# Patient Record
Sex: MALE | Race: WHITE | NOT HISPANIC OR LATINO | Employment: OTHER | URBAN - METROPOLITAN AREA
[De-identification: names, ages, dates, MRNs, and addresses within clinical notes are randomized per-mention and may not be internally consistent; named-entity substitution may affect disease eponyms.]

---

## 2024-07-09 ENCOUNTER — TRANSFERRED RECORDS (OUTPATIENT)
Dept: HEALTH INFORMATION MANAGEMENT | Facility: CLINIC | Age: 89
End: 2024-07-09

## 2024-08-22 ENCOUNTER — ANCILLARY PROCEDURE (OUTPATIENT)
Dept: MRI IMAGING | Facility: CLINIC | Age: 89
End: 2024-08-22
Attending: UROLOGY
Payer: MEDICARE

## 2024-08-22 DIAGNOSIS — R97.20 ELEVATED PSA: ICD-10-CM

## 2024-08-22 PROCEDURE — 72197 MRI PELVIS W/O & W/DYE: CPT

## 2024-08-22 PROCEDURE — A9585 GADOBUTROL INJECTION: HCPCS | Performed by: UROLOGY

## 2024-08-22 PROCEDURE — 255N000002 HC RX 255 OP 636: Performed by: UROLOGY

## 2024-08-22 RX ORDER — GADOBUTROL 604.72 MG/ML
7.5 INJECTION INTRAVENOUS ONCE
Status: COMPLETED | OUTPATIENT
Start: 2024-08-22 | End: 2024-08-22

## 2024-08-22 RX ADMIN — GADOBUTROL 7.5 ML: 604.72 INJECTION INTRAVENOUS at 16:05

## 2024-12-31 ENCOUNTER — APPOINTMENT (OUTPATIENT)
Dept: CT IMAGING | Facility: CLINIC | Age: 89
DRG: 194 | End: 2024-12-31
Attending: EMERGENCY MEDICINE
Payer: MEDICARE

## 2024-12-31 ENCOUNTER — APPOINTMENT (OUTPATIENT)
Dept: GENERAL RADIOLOGY | Facility: CLINIC | Age: 89
DRG: 194 | End: 2024-12-31
Attending: EMERGENCY MEDICINE
Payer: MEDICARE

## 2024-12-31 ENCOUNTER — HOSPITAL ENCOUNTER (INPATIENT)
Facility: CLINIC | Age: 89
DRG: 194 | End: 2024-12-31
Attending: EMERGENCY MEDICINE | Admitting: HOSPITALIST
Payer: MEDICARE

## 2024-12-31 DIAGNOSIS — E78.5 HYPERLIPIDEMIA LDL GOAL <130: ICD-10-CM

## 2024-12-31 DIAGNOSIS — R53.1 GENERAL WEAKNESS: ICD-10-CM

## 2024-12-31 DIAGNOSIS — B00.59 HSV (HERPES SIMPLEX VIRUS) INFECTION OF EYELID: ICD-10-CM

## 2024-12-31 DIAGNOSIS — J18.9 PNEUMONIA OF BOTH LUNGS DUE TO INFECTIOUS ORGANISM, UNSPECIFIED PART OF LUNG: ICD-10-CM

## 2024-12-31 DIAGNOSIS — K21.00 GASTROESOPHAGEAL REFLUX DISEASE WITH ESOPHAGITIS WITHOUT HEMORRHAGE: ICD-10-CM

## 2024-12-31 DIAGNOSIS — R06.09 DYSPNEA ON EXERTION: ICD-10-CM

## 2024-12-31 DIAGNOSIS — N40.0 BENIGN PROSTATIC HYPERPLASIA WITHOUT LOWER URINARY TRACT SYMPTOMS: ICD-10-CM

## 2024-12-31 DIAGNOSIS — F02.A0 MILD DEMENTIA ASSOCIATED WITH OTHER UNDERLYING DISEASE, WITHOUT BEHAVIORAL DISTURBANCE, PSYCHOTIC DISTURBANCE, MOOD DISTURBANCE, OR ANXIETY (H): Primary | ICD-10-CM

## 2024-12-31 DIAGNOSIS — I10 BENIGN ESSENTIAL HYPERTENSION: ICD-10-CM

## 2024-12-31 LAB
ANION GAP SERPL CALCULATED.3IONS-SCNC: 10 MMOL/L (ref 7–15)
ATRIAL RATE - MUSE: 80 BPM
BASOPHILS # BLD AUTO: 0.1 10E3/UL (ref 0–0.2)
BASOPHILS NFR BLD AUTO: 0 %
BUN SERPL-MCNC: 22 MG/DL (ref 8–23)
CALCIUM SERPL-MCNC: 9.2 MG/DL (ref 8.8–10.4)
CHLORIDE SERPL-SCNC: 107 MMOL/L (ref 98–107)
CREAT SERPL-MCNC: 0.97 MG/DL (ref 0.67–1.17)
D DIMER PPP FEU-MCNC: 3.14 UG/ML FEU (ref 0–0.5)
DIASTOLIC BLOOD PRESSURE - MUSE: NORMAL MMHG
EGFRCR SERPLBLD CKD-EPI 2021: 74 ML/MIN/1.73M2
EOSINOPHIL # BLD AUTO: 0.1 10E3/UL (ref 0–0.7)
EOSINOPHIL NFR BLD AUTO: 1 %
ERYTHROCYTE [DISTWIDTH] IN BLOOD BY AUTOMATED COUNT: 12.7 % (ref 10–15)
FLUAV RNA SPEC QL NAA+PROBE: NEGATIVE
FLUBV RNA RESP QL NAA+PROBE: NEGATIVE
GLUCOSE SERPL-MCNC: 133 MG/DL (ref 70–99)
HCO3 SERPL-SCNC: 26 MMOL/L (ref 22–29)
HCT VFR BLD AUTO: 36.3 % (ref 40–53)
HGB BLD-MCNC: 11.9 G/DL (ref 13.3–17.7)
HOLD SPECIMEN: NORMAL
IMM GRANULOCYTES # BLD: 0.2 10E3/UL
IMM GRANULOCYTES NFR BLD: 2 %
INTERPRETATION ECG - MUSE: NORMAL
LYMPHOCYTES # BLD AUTO: 1.2 10E3/UL (ref 0.8–5.3)
LYMPHOCYTES NFR BLD AUTO: 10 %
MCH RBC QN AUTO: 31.5 PG (ref 26.5–33)
MCHC RBC AUTO-ENTMCNC: 32.8 G/DL (ref 31.5–36.5)
MCV RBC AUTO: 96 FL (ref 78–100)
MONOCYTES # BLD AUTO: 0.7 10E3/UL (ref 0–1.3)
MONOCYTES NFR BLD AUTO: 6 %
NEUTROPHILS # BLD AUTO: 9.3 10E3/UL (ref 1.6–8.3)
NEUTROPHILS NFR BLD AUTO: 80 %
NRBC # BLD AUTO: 0 10E3/UL
NRBC BLD AUTO-RTO: 0 /100
NT-PROBNP SERPL-MCNC: 1269 PG/ML (ref 0–1800)
P AXIS - MUSE: 25 DEGREES
PLATELET # BLD AUTO: 324 10E3/UL (ref 150–450)
POTASSIUM SERPL-SCNC: 4.5 MMOL/L (ref 3.4–5.3)
PR INTERVAL - MUSE: 168 MS
QRS DURATION - MUSE: 130 MS
QT - MUSE: 396 MS
QTC - MUSE: 456 MS
R AXIS - MUSE: -53 DEGREES
RBC # BLD AUTO: 3.78 10E6/UL (ref 4.4–5.9)
RSV RNA SPEC NAA+PROBE: NEGATIVE
SARS-COV-2 RNA RESP QL NAA+PROBE: NEGATIVE
SODIUM SERPL-SCNC: 143 MMOL/L (ref 135–145)
SYSTOLIC BLOOD PRESSURE - MUSE: NORMAL MMHG
T AXIS - MUSE: 43 DEGREES
TROPONIN T SERPL HS-MCNC: 37 NG/L
TROPONIN T SERPL HS-MCNC: 39 NG/L
VENTRICULAR RATE- MUSE: 80 BPM
WBC # BLD AUTO: 11.6 10E3/UL (ref 4–11)

## 2024-12-31 PROCEDURE — 250N000009 HC RX 250: Performed by: EMERGENCY MEDICINE

## 2024-12-31 PROCEDURE — 82550 ASSAY OF CK (CPK): CPT | Performed by: INTERNAL MEDICINE

## 2024-12-31 PROCEDURE — 250N000011 HC RX IP 250 OP 636: Performed by: EMERGENCY MEDICINE

## 2024-12-31 PROCEDURE — 83880 ASSAY OF NATRIURETIC PEPTIDE: CPT | Performed by: EMERGENCY MEDICINE

## 2024-12-31 PROCEDURE — 36415 COLL VENOUS BLD VENIPUNCTURE: CPT | Performed by: EMERGENCY MEDICINE

## 2024-12-31 PROCEDURE — 93005 ELECTROCARDIOGRAM TRACING: CPT

## 2024-12-31 PROCEDURE — 87637 SARSCOV2&INF A&B&RSV AMP PRB: CPT | Performed by: EMERGENCY MEDICINE

## 2024-12-31 PROCEDURE — G1010 CDSM STANSON: HCPCS

## 2024-12-31 PROCEDURE — 96365 THER/PROPH/DIAG IV INF INIT: CPT

## 2024-12-31 PROCEDURE — 99285 EMERGENCY DEPT VISIT HI MDM: CPT | Mod: 25

## 2024-12-31 PROCEDURE — 80048 BASIC METABOLIC PNL TOTAL CA: CPT | Performed by: EMERGENCY MEDICINE

## 2024-12-31 PROCEDURE — 83036 HEMOGLOBIN GLYCOSYLATED A1C: CPT | Performed by: INTERNAL MEDICINE

## 2024-12-31 PROCEDURE — 85379 FIBRIN DEGRADATION QUANT: CPT | Performed by: EMERGENCY MEDICINE

## 2024-12-31 PROCEDURE — 72170 X-RAY EXAM OF PELVIS: CPT

## 2024-12-31 PROCEDURE — 84484 ASSAY OF TROPONIN QUANT: CPT | Performed by: EMERGENCY MEDICINE

## 2024-12-31 PROCEDURE — 85014 HEMATOCRIT: CPT | Performed by: EMERGENCY MEDICINE

## 2024-12-31 PROCEDURE — 85049 AUTOMATED PLATELET COUNT: CPT | Performed by: EMERGENCY MEDICINE

## 2024-12-31 PROCEDURE — 85004 AUTOMATED DIFF WBC COUNT: CPT | Performed by: EMERGENCY MEDICINE

## 2024-12-31 PROCEDURE — 84145 PROCALCITONIN (PCT): CPT | Performed by: INTERNAL MEDICINE

## 2024-12-31 PROCEDURE — 84443 ASSAY THYROID STIM HORMONE: CPT | Performed by: INTERNAL MEDICINE

## 2024-12-31 PROCEDURE — 71046 X-RAY EXAM CHEST 2 VIEWS: CPT

## 2024-12-31 PROCEDURE — 85025 COMPLETE CBC W/AUTO DIFF WBC: CPT | Performed by: EMERGENCY MEDICINE

## 2024-12-31 RX ORDER — FAMOTIDINE 20 MG/1
20 TABLET, FILM COATED ORAL 2 TIMES DAILY
Status: ON HOLD | COMMUNITY
End: 2025-01-06

## 2024-12-31 RX ORDER — LOSARTAN POTASSIUM 25 MG/1
25 TABLET ORAL DAILY
Status: ON HOLD | COMMUNITY
End: 2025-01-06

## 2024-12-31 RX ORDER — ROSUVASTATIN CALCIUM 10 MG/1
10 TABLET, COATED ORAL DAILY
Status: ON HOLD | COMMUNITY
End: 2025-01-06

## 2024-12-31 RX ORDER — DONEPEZIL HYDROCHLORIDE 5 MG/1
5 TABLET, FILM COATED ORAL EVERY EVENING
Status: ON HOLD | COMMUNITY
End: 2025-01-06

## 2024-12-31 RX ORDER — TRAZODONE HYDROCHLORIDE 50 MG/1
12.5 TABLET, FILM COATED ORAL AT BEDTIME
Status: ON HOLD | COMMUNITY
End: 2025-01-05

## 2024-12-31 RX ORDER — AZITHROMYCIN MONOHYDRATE 500 MG/5ML
500 INJECTION, POWDER, LYOPHILIZED, FOR SOLUTION INTRAVENOUS ONCE
Status: COMPLETED | OUTPATIENT
Start: 2024-12-31 | End: 2025-01-01

## 2024-12-31 RX ORDER — TAMSULOSIN HYDROCHLORIDE 0.4 MG/1
0.4 CAPSULE ORAL EVERY EVENING
Status: ON HOLD | COMMUNITY
End: 2025-01-06

## 2024-12-31 RX ORDER — FAMCICLOVIR 500 MG/1
500 TABLET ORAL 2 TIMES DAILY
Status: ON HOLD | COMMUNITY
End: 2025-01-06

## 2024-12-31 RX ORDER — ACETAMINOPHEN 500 MG
500 TABLET ORAL EVERY EVENING
Status: ON HOLD | COMMUNITY
End: 2025-01-05

## 2024-12-31 RX ORDER — CEFTRIAXONE 1 G/1
1 INJECTION, POWDER, FOR SOLUTION INTRAMUSCULAR; INTRAVENOUS ONCE
Status: COMPLETED | OUTPATIENT
Start: 2024-12-31 | End: 2025-01-01

## 2024-12-31 RX ORDER — IOPAMIDOL 755 MG/ML
59 INJECTION, SOLUTION INTRAVASCULAR ONCE
Status: COMPLETED | OUTPATIENT
Start: 2024-12-31 | End: 2024-12-31

## 2024-12-31 RX ORDER — METOPROLOL SUCCINATE 25 MG/1
25 TABLET, EXTENDED RELEASE ORAL DAILY
Status: ON HOLD | COMMUNITY
End: 2025-01-06

## 2024-12-31 RX ADMIN — SODIUM CHLORIDE 85 ML: 9 INJECTION, SOLUTION INTRAVENOUS at 22:44

## 2024-12-31 RX ADMIN — IOPAMIDOL 59 ML: 755 INJECTION, SOLUTION INTRAVENOUS at 22:43

## 2024-12-31 RX ADMIN — AZITHROMYCIN MONOHYDRATE 500 MG: 500 INJECTION, POWDER, LYOPHILIZED, FOR SOLUTION INTRAVENOUS at 23:56

## 2024-12-31 RX ADMIN — CEFTRIAXONE SODIUM 1 G: 1 INJECTION, POWDER, FOR SOLUTION INTRAMUSCULAR; INTRAVENOUS at 23:54

## 2024-12-31 ASSESSMENT — COLUMBIA-SUICIDE SEVERITY RATING SCALE - C-SSRS
6. HAVE YOU EVER DONE ANYTHING, STARTED TO DO ANYTHING, OR PREPARED TO DO ANYTHING TO END YOUR LIFE?: NO
2. HAVE YOU ACTUALLY HAD ANY THOUGHTS OF KILLING YOURSELF IN THE PAST MONTH?: NO
1. IN THE PAST MONTH, HAVE YOU WISHED YOU WERE DEAD OR WISHED YOU COULD GO TO SLEEP AND NOT WAKE UP?: NO

## 2024-12-31 ASSESSMENT — ACTIVITIES OF DAILY LIVING (ADL)
ADLS_ACUITY_SCORE: 41

## 2024-12-31 NOTE — ED TRIAGE NOTES
Pt c/o bilateral leg pain and R hip pain every since he was shoveling his sidewalk 2 weeks ago. Pt also c/o cough and pain to bilateral upper chest when he inhales. Pt lost his wife in october and reports some depression and decreased appetite.      Triage Assessment (Adult)       Row Name 12/31/24 6635          Triage Assessment    Airway WDL WDL        Respiratory WDL    Respiratory WDL WDL        Cardiac WDL    Cardiac WDL chest pain        Chest Pain Assessment    Chest Pain Location anterior chest, left;anterior chest, right     Character stabbing        Cognitive/Neuro/Behavioral WDL    Cognitive/Neuro/Behavioral WDL WDL

## 2025-01-01 ENCOUNTER — APPOINTMENT (OUTPATIENT)
Dept: SPEECH THERAPY | Facility: CLINIC | Age: OVER 89
DRG: 194 | End: 2025-01-01
Attending: INTERNAL MEDICINE
Payer: MEDICARE

## 2025-01-01 ENCOUNTER — APPOINTMENT (OUTPATIENT)
Dept: ULTRASOUND IMAGING | Facility: CLINIC | Age: OVER 89
DRG: 194 | End: 2025-01-01
Attending: INTERNAL MEDICINE
Payer: MEDICARE

## 2025-01-01 ENCOUNTER — APPOINTMENT (OUTPATIENT)
Dept: CT IMAGING | Facility: CLINIC | Age: OVER 89
DRG: 194 | End: 2025-01-01
Attending: INTERNAL MEDICINE
Payer: MEDICARE

## 2025-01-01 PROBLEM — J18.9 PNEUMONIA OF BOTH LUNGS DUE TO INFECTIOUS ORGANISM, UNSPECIFIED PART OF LUNG: Status: ACTIVE | Noted: 2025-01-01

## 2025-01-01 PROBLEM — R06.09 DYSPNEA ON EXERTION: Status: ACTIVE | Noted: 2025-01-01

## 2025-01-01 PROBLEM — R53.1 GENERAL WEAKNESS: Status: ACTIVE | Noted: 2025-01-01

## 2025-01-01 LAB
ALBUMIN SERPL BCG-MCNC: 2.7 G/DL (ref 3.5–5.2)
ALP SERPL-CCNC: 75 U/L (ref 40–150)
ALT SERPL W P-5'-P-CCNC: 35 U/L (ref 0–70)
ANION GAP SERPL CALCULATED.3IONS-SCNC: 8 MMOL/L (ref 7–15)
AST SERPL W P-5'-P-CCNC: 35 U/L (ref 0–45)
BASOPHILS # BLD AUTO: 0.1 10E3/UL (ref 0–0.2)
BASOPHILS NFR BLD AUTO: 1 %
BILIRUB SERPL-MCNC: 0.4 MG/DL
BUN SERPL-MCNC: 15 MG/DL (ref 8–23)
C PNEUM DNA SPEC QL NAA+PROBE: NOT DETECTED
CALCIUM SERPL-MCNC: 8.4 MG/DL (ref 8.8–10.4)
CHLORIDE SERPL-SCNC: 107 MMOL/L (ref 98–107)
CK SERPL-CCNC: 83 U/L (ref 39–308)
CREAT SERPL-MCNC: 0.77 MG/DL (ref 0.67–1.17)
EGFRCR SERPLBLD CKD-EPI 2021: 85 ML/MIN/1.73M2
EOSINOPHIL # BLD AUTO: 0.2 10E3/UL (ref 0–0.7)
EOSINOPHIL NFR BLD AUTO: 2 %
ERYTHROCYTE [DISTWIDTH] IN BLOOD BY AUTOMATED COUNT: 12.7 % (ref 10–15)
ERYTHROCYTE [SEDIMENTATION RATE] IN BLOOD BY WESTERGREN METHOD: 82 MM/HR (ref 0–20)
EST. AVERAGE GLUCOSE BLD GHB EST-MCNC: 120 MG/DL
FLUAV H1 2009 PAND RNA SPEC QL NAA+PROBE: NOT DETECTED
FLUAV H1 RNA SPEC QL NAA+PROBE: NOT DETECTED
FLUAV H3 RNA SPEC QL NAA+PROBE: NOT DETECTED
FLUAV RNA SPEC QL NAA+PROBE: NOT DETECTED
FLUBV RNA SPEC QL NAA+PROBE: NOT DETECTED
GLUCOSE SERPL-MCNC: 100 MG/DL (ref 70–99)
HADV DNA SPEC QL NAA+PROBE: NOT DETECTED
HBA1C MFR BLD: 5.8 %
HCO3 SERPL-SCNC: 26 MMOL/L (ref 22–29)
HCOV PNL SPEC NAA+PROBE: NOT DETECTED
HCT VFR BLD AUTO: 30.6 % (ref 40–53)
HGB BLD-MCNC: 10.5 G/DL (ref 13.3–17.7)
HMPV RNA SPEC QL NAA+PROBE: NOT DETECTED
HPIV1 RNA SPEC QL NAA+PROBE: NOT DETECTED
HPIV2 RNA SPEC QL NAA+PROBE: NOT DETECTED
HPIV3 RNA SPEC QL NAA+PROBE: NOT DETECTED
HPIV4 RNA SPEC QL NAA+PROBE: NOT DETECTED
IMM GRANULOCYTES # BLD: 0.1 10E3/UL
IMM GRANULOCYTES NFR BLD: 2 %
INR PPP: 1.22 (ref 0.85–1.15)
L PNEUMO1 AG UR QL IA: NEGATIVE
LYMPHOCYTES # BLD AUTO: 1 10E3/UL (ref 0.8–5.3)
LYMPHOCYTES NFR BLD AUTO: 12 %
M PNEUMO DNA SPEC QL NAA+PROBE: NOT DETECTED
MCH RBC QN AUTO: 32.5 PG (ref 26.5–33)
MCHC RBC AUTO-ENTMCNC: 34.3 G/DL (ref 31.5–36.5)
MCV RBC AUTO: 95 FL (ref 78–100)
MONOCYTES # BLD AUTO: 0.6 10E3/UL (ref 0–1.3)
MONOCYTES NFR BLD AUTO: 7 %
NEUTROPHILS # BLD AUTO: 6.4 10E3/UL (ref 1.6–8.3)
NEUTROPHILS NFR BLD AUTO: 76 %
NRBC # BLD AUTO: 0 10E3/UL
NRBC BLD AUTO-RTO: 0 /100
PLATELET # BLD AUTO: 286 10E3/UL (ref 150–450)
POTASSIUM SERPL-SCNC: 4.1 MMOL/L (ref 3.4–5.3)
PROCALCITONIN SERPL IA-MCNC: 0.31 NG/ML
PROT SERPL-MCNC: 5.6 G/DL (ref 6.4–8.3)
RBC # BLD AUTO: 3.23 10E6/UL (ref 4.4–5.9)
RSV RNA SPEC QL NAA+PROBE: NOT DETECTED
RSV RNA SPEC QL NAA+PROBE: NOT DETECTED
RV+EV RNA SPEC QL NAA+PROBE: NOT DETECTED
S PNEUM AG SPEC QL: POSITIVE
SODIUM SERPL-SCNC: 141 MMOL/L (ref 135–145)
SPECIMEN TYPE: ABNORMAL
TSH SERPL DL<=0.005 MIU/L-ACNC: 1.28 UIU/ML (ref 0.3–4.2)
WBC # BLD AUTO: 8.4 10E3/UL (ref 4–11)

## 2025-01-01 PROCEDURE — 85004 AUTOMATED DIFF WBC COUNT: CPT | Performed by: INTERNAL MEDICINE

## 2025-01-01 PROCEDURE — 96372 THER/PROPH/DIAG INJ SC/IM: CPT | Performed by: INTERNAL MEDICINE

## 2025-01-01 PROCEDURE — 82040 ASSAY OF SERUM ALBUMIN: CPT | Performed by: INTERNAL MEDICINE

## 2025-01-01 PROCEDURE — 92610 EVALUATE SWALLOWING FUNCTION: CPT | Mod: GN

## 2025-01-01 PROCEDURE — 96376 TX/PRO/DX INJ SAME DRUG ADON: CPT

## 2025-01-01 PROCEDURE — 80051 ELECTROLYTE PANEL: CPT | Performed by: INTERNAL MEDICINE

## 2025-01-01 PROCEDURE — 99223 1ST HOSP IP/OBS HIGH 75: CPT | Mod: AI | Performed by: INTERNAL MEDICINE

## 2025-01-01 PROCEDURE — 36415 COLL VENOUS BLD VENIPUNCTURE: CPT | Performed by: INTERNAL MEDICINE

## 2025-01-01 PROCEDURE — 87581 M.PNEUMON DNA AMP PROBE: CPT | Performed by: INTERNAL MEDICINE

## 2025-01-01 PROCEDURE — 99207 PR NO BILLABLE SERVICE THIS VISIT: CPT

## 2025-01-01 PROCEDURE — 87205 SMEAR GRAM STAIN: CPT | Performed by: INTERNAL MEDICINE

## 2025-01-01 PROCEDURE — 96361 HYDRATE IV INFUSION ADD-ON: CPT

## 2025-01-01 PROCEDURE — 85652 RBC SED RATE AUTOMATED: CPT | Performed by: INTERNAL MEDICINE

## 2025-01-01 PROCEDURE — 87899 AGENT NOS ASSAY W/OPTIC: CPT | Performed by: INTERNAL MEDICINE

## 2025-01-01 PROCEDURE — 87486 CHLMYD PNEUM DNA AMP PROBE: CPT | Performed by: INTERNAL MEDICINE

## 2025-01-01 PROCEDURE — 85014 HEMATOCRIT: CPT | Performed by: INTERNAL MEDICINE

## 2025-01-01 PROCEDURE — 250N000011 HC RX IP 250 OP 636: Performed by: INTERNAL MEDICINE

## 2025-01-01 PROCEDURE — 258N000003 HC RX IP 258 OP 636

## 2025-01-01 PROCEDURE — 99418 PROLNG IP/OBS E/M EA 15 MIN: CPT | Performed by: INTERNAL MEDICINE

## 2025-01-01 PROCEDURE — G0378 HOSPITAL OBSERVATION PER HR: HCPCS

## 2025-01-01 PROCEDURE — 73700 CT LOWER EXTREMITY W/O DYE: CPT | Mod: LT

## 2025-01-01 PROCEDURE — 85610 PROTHROMBIN TIME: CPT | Performed by: INTERNAL MEDICINE

## 2025-01-01 PROCEDURE — 93970 EXTREMITY STUDY: CPT

## 2025-01-01 PROCEDURE — 99232 SBSQ HOSP IP/OBS MODERATE 35: CPT | Performed by: HOSPITALIST

## 2025-01-01 PROCEDURE — 250N000013 HC RX MED GY IP 250 OP 250 PS 637: Performed by: INTERNAL MEDICINE

## 2025-01-01 PROCEDURE — 87070 CULTURE OTHR SPECIMN AEROBIC: CPT | Performed by: INTERNAL MEDICINE

## 2025-01-01 RX ORDER — SODIUM CHLORIDE, SODIUM LACTATE, POTASSIUM CHLORIDE, CALCIUM CHLORIDE 600; 310; 30; 20 MG/100ML; MG/100ML; MG/100ML; MG/100ML
INJECTION, SOLUTION INTRAVENOUS CONTINUOUS
Status: ACTIVE | OUTPATIENT
Start: 2025-01-01 | End: 2025-01-01

## 2025-01-01 RX ORDER — HYDRALAZINE HYDROCHLORIDE 20 MG/ML
10 INJECTION INTRAMUSCULAR; INTRAVENOUS EVERY 6 HOURS PRN
Status: DISCONTINUED | OUTPATIENT
Start: 2025-01-01 | End: 2025-01-01

## 2025-01-01 RX ORDER — HYDRALAZINE HYDROCHLORIDE 20 MG/ML
10 INJECTION INTRAMUSCULAR; INTRAVENOUS EVERY 6 HOURS PRN
Status: DISCONTINUED | OUTPATIENT
Start: 2025-01-01 | End: 2025-01-06 | Stop reason: HOSPADM

## 2025-01-01 RX ORDER — TAMSULOSIN HYDROCHLORIDE 0.4 MG/1
0.4 CAPSULE ORAL EVERY EVENING
Status: DISCONTINUED | OUTPATIENT
Start: 2025-01-01 | End: 2025-01-06 | Stop reason: HOSPADM

## 2025-01-01 RX ORDER — METOPROLOL SUCCINATE 25 MG/1
25 TABLET, EXTENDED RELEASE ORAL DAILY
Status: DISCONTINUED | OUTPATIENT
Start: 2025-01-01 | End: 2025-01-06 | Stop reason: HOSPADM

## 2025-01-01 RX ORDER — ONDANSETRON 4 MG/1
4 TABLET, ORALLY DISINTEGRATING ORAL EVERY 6 HOURS PRN
Status: DISCONTINUED | OUTPATIENT
Start: 2025-01-01 | End: 2025-01-06 | Stop reason: HOSPADM

## 2025-01-01 RX ORDER — LOSARTAN POTASSIUM 25 MG/1
25 TABLET ORAL DAILY
Status: DISCONTINUED | OUTPATIENT
Start: 2025-01-01 | End: 2025-01-06 | Stop reason: HOSPADM

## 2025-01-01 RX ORDER — PROCHLORPERAZINE MALEATE 5 MG/1
5 TABLET ORAL EVERY 6 HOURS PRN
Status: DISCONTINUED | OUTPATIENT
Start: 2025-01-01 | End: 2025-01-06 | Stop reason: HOSPADM

## 2025-01-01 RX ORDER — ACETAMINOPHEN 650 MG/1
650 SUPPOSITORY RECTAL EVERY 4 HOURS PRN
Status: DISCONTINUED | OUTPATIENT
Start: 2025-01-01 | End: 2025-01-06 | Stop reason: HOSPADM

## 2025-01-01 RX ORDER — DONEPEZIL HYDROCHLORIDE 5 MG/1
5 TABLET, FILM COATED ORAL EVERY EVENING
Status: DISCONTINUED | OUTPATIENT
Start: 2025-01-01 | End: 2025-01-06 | Stop reason: HOSPADM

## 2025-01-01 RX ORDER — FAMOTIDINE 20 MG/1
20 TABLET, FILM COATED ORAL 2 TIMES DAILY
Status: DISCONTINUED | OUTPATIENT
Start: 2025-01-01 | End: 2025-01-06 | Stop reason: HOSPADM

## 2025-01-01 RX ORDER — AZITHROMYCIN 250 MG/1
250 TABLET, FILM COATED ORAL AT BEDTIME
Status: COMPLETED | OUTPATIENT
Start: 2025-01-01 | End: 2025-01-04

## 2025-01-01 RX ORDER — ROSUVASTATIN CALCIUM 10 MG/1
10 TABLET, COATED ORAL DAILY
Status: DISCONTINUED | OUTPATIENT
Start: 2025-01-01 | End: 2025-01-06 | Stop reason: HOSPADM

## 2025-01-01 RX ORDER — ONDANSETRON 2 MG/ML
4 INJECTION INTRAMUSCULAR; INTRAVENOUS EVERY 6 HOURS PRN
Status: DISCONTINUED | OUTPATIENT
Start: 2025-01-01 | End: 2025-01-06 | Stop reason: HOSPADM

## 2025-01-01 RX ORDER — CEFTRIAXONE 2 G/1
2 INJECTION, POWDER, FOR SOLUTION INTRAMUSCULAR; INTRAVENOUS EVERY 24 HOURS
Status: DISCONTINUED | OUTPATIENT
Start: 2025-01-01 | End: 2025-01-05

## 2025-01-01 RX ORDER — AMOXICILLIN 250 MG
1 CAPSULE ORAL 2 TIMES DAILY PRN
Status: DISCONTINUED | OUTPATIENT
Start: 2025-01-01 | End: 2025-01-06 | Stop reason: HOSPADM

## 2025-01-01 RX ORDER — ACETAMINOPHEN 325 MG/1
650 TABLET ORAL EVERY 4 HOURS PRN
Status: DISCONTINUED | OUTPATIENT
Start: 2025-01-01 | End: 2025-01-06 | Stop reason: HOSPADM

## 2025-01-01 RX ORDER — ENOXAPARIN SODIUM 100 MG/ML
40 INJECTION SUBCUTANEOUS EVERY 24 HOURS
Status: DISCONTINUED | OUTPATIENT
Start: 2025-01-01 | End: 2025-01-06 | Stop reason: HOSPADM

## 2025-01-01 RX ORDER — AMOXICILLIN 250 MG
2 CAPSULE ORAL 2 TIMES DAILY PRN
Status: DISCONTINUED | OUTPATIENT
Start: 2025-01-01 | End: 2025-01-06 | Stop reason: HOSPADM

## 2025-01-01 RX ADMIN — CEFTRIAXONE SODIUM 2 G: 2 INJECTION, POWDER, FOR SOLUTION INTRAMUSCULAR; INTRAVENOUS at 22:40

## 2025-01-01 RX ADMIN — METOPROLOL SUCCINATE 25 MG: 25 TABLET, EXTENDED RELEASE ORAL at 13:07

## 2025-01-01 RX ADMIN — FAMOTIDINE 20 MG: 20 TABLET ORAL at 13:07

## 2025-01-01 RX ADMIN — ENOXAPARIN SODIUM 40 MG: 40 INJECTION SUBCUTANEOUS at 09:27

## 2025-01-01 RX ADMIN — TAMSULOSIN HYDROCHLORIDE 0.4 MG: 0.4 CAPSULE ORAL at 19:30

## 2025-01-01 RX ADMIN — AZITHROMYCIN DIHYDRATE 250 MG: 250 TABLET ORAL at 21:13

## 2025-01-01 RX ADMIN — SODIUM CHLORIDE, POTASSIUM CHLORIDE, SODIUM LACTATE AND CALCIUM CHLORIDE: 600; 310; 30; 20 INJECTION, SOLUTION INTRAVENOUS at 12:44

## 2025-01-01 RX ADMIN — LOSARTAN POTASSIUM 25 MG: 25 TABLET, FILM COATED ORAL at 13:07

## 2025-01-01 RX ADMIN — ROSUVASTATIN CALCIUM 10 MG: 10 TABLET, FILM COATED ORAL at 13:07

## 2025-01-01 RX ADMIN — FAMOTIDINE 20 MG: 20 TABLET ORAL at 19:30

## 2025-01-01 RX ADMIN — DONEPEZIL HYDROCHLORIDE 5 MG: 5 TABLET, FILM COATED ORAL at 21:13

## 2025-01-01 ASSESSMENT — ACTIVITIES OF DAILY LIVING (ADL)
ADLS_ACUITY_SCORE: 46
ADLS_ACUITY_SCORE: 46
ADLS_ACUITY_SCORE: 41
ADLS_ACUITY_SCORE: 46
ADLS_ACUITY_SCORE: 52
ADLS_ACUITY_SCORE: 41
ADLS_ACUITY_SCORE: 52
ADLS_ACUITY_SCORE: 46
ADLS_ACUITY_SCORE: 41
ADLS_ACUITY_SCORE: 46
ADLS_ACUITY_SCORE: 41
ADLS_ACUITY_SCORE: 46
ADLS_ACUITY_SCORE: 52
ADLS_ACUITY_SCORE: 41

## 2025-01-01 NOTE — H&P
History and Physical - Hospitalist Service       Date of Admission:  12/31/2024    Assessment & Plan      Riki Alvarez is a 89 year old male admitted on 12/31/2024. He has PMH most notable for HTN, HLD, cognitive impairment, GERD, BPH that presents with progressive generalized weakness and has findings concerning for community-acquired versus aspiration pneumonia.    #Suspected community-acquired versus aspiration pneumonia  The patient presents with productive cough, dyspnea on exertion and progressive generalized weakness.  On exam he is AF, RR 20, satting 97% on RA.  WBC 11.6.  COVID/RSV/flu swab negative.  CXR with bibasilar infiltrates greater on the left.  CTA chest with no PE but did demonstrate infiltrates greater on the left as well and debris-filled left lower lobe.  The patient endorses aspirating his secretions from time to time but not solids or liquids.  - Continue Rocephin and azithromycin started in the ED  - SLP consult  - Add on procalcitonin  - RSV/flu/COVID swab negative  - Strep pneumo urinary antigen, Legionella urinary antigen, sputum culture and Gram stain  - Head of bed > 30 degrees  -Respiratory viral panel    #Left hip pain  #Generalized weakness  #Bilateral lower extremity pain  The patient presents with progressive generalized weakness, pain in his knees, lower extremity musculature, and left hip.  Denies recent falls.  On exam he is able to flex his hips and knees .  X-ray pelvis with no acute fracture but does demonstrate mild degenerative arthritis of both hips.  Differential includes osteoarthritis (he did previously receive steroid injections in his knees) v occult fracture.  Less likely polymyalgia rheumatica or rhabdomyolysis.  Alternatively he could just have progressive generalized weakness in the setting of pneumonia and debility.  - CT left hip  - Add on TSH, CK, ESR  - PT/OT consult pending laboratory and imaging results (not  ordered at this time)  - Tylenol as needed for pain    #Elevated troponin  #Suspected pleurisy  #Noncardiac chest pain  The patient presents with several days of chest pain that is only present when he coughs or takes deep breaths..  On exam he was asked to cough which did reproduce the pain.  No pain when he is not coughing.  EKG sinus rhythm with sinus arrhythmia, right bundle branch block, left anterior fascicular block.  No contiguous ST segment elevation/depressions or diffuse T wave inversions.  Troponin is mildly elevated and downtrending and likely due to demand ischemia in the setting of pneumonia.  CTA chest negative for PE.  No friction rubs on exam to suggest pericarditis.  - Tylenol as needed    #Elevated D-dimer  - D-dimer significantly elevated at 3.31  - CTA chest negative for PE  - Will acquire bilateral lower extremity ultrasound to rule out lower extremity DVT  - Lovenox for DVT prophylaxis    #Grief  - The patient lost his wife in 10/2024.  He states he is depressed secondary to this but denies any suicidal ideation.    #Hyperglycemia  - Will acquire A1c, monitor glucose with labs for now    #HTN  - Continue PTA losartan 25 mg daily, metoprolol succinate 25 mg daily    #HLD  - Continue PTA Crestor 10 mg daily    #BPH  - Continue PTA Flomax    #Cognitive impairment  - Continue PTA donepezil        Diet:  N.p.o. pending SLP xanderal, no IVF for now  DVT Prophylaxis: Enoxaparin (Lovenox) SQ  Garcia Catheter: Not present  Lines: None     Cardiac Monitoring: None  Code Status:  DNR but okay for prearrest intubation, discussed with the patient and his daughter on admission    Clinically Significant Risk Factors Present on Admission                   # Hypertension: Home medication list includes antihypertensive(s)                      Disposition Plan     Medically Ready for Discharge: Anticipated Tomorrow           Kelechi Ibanez MD  Hospitalist Service  [facility]  Securely  message with John (more info)  Text page via [facility] Paging/Directory     ______________________________________________________________________    Chief Complaint   Generalized weakness and cough    History is obtained from the patient    History of Present Illness   Riki Alvarez is a 89 year old male who has PMH most notable for HTN, HLD, cognitive impairment, GERD, BPH that presents with progressive generalized weakness and a cough.    The patient and his daughter report that about 10 days ago the patient went out to shovel snow.  Since that time the patient has had progressive generalized weakness and pain in his lower extremities: muscles, knees and left hip.  He denies history of recent falls.  He does have a history of osteoarthritis and previously received steroid injections in both knees.  His weakness has been progressive and he has not been ambulating.  Over the last several days he has developed dyspnea on exertion, productive cough of green sputum.  No fevers.  He does endorse chest pain with deep breathing and coughing.  His p.o. intake has been poor.  He denies headache, nausea, vomiting, abdominal pain, diarrhea, urinary complaints.  The patient states he usually does not have any trouble swallowing solids or liquids but does endorse aspirating some of his secretions.  He uses a cane to ambulate outside the house    ER course:  - AF, HR 70s, RR 20, BP 150s/60s and satting 96% on RA  - Electrolytes notable for glucose of 133, creatinine normal  - WBC 11.6  - EKG sinus rhythm with sinus arrhythmia, right bundle branch block, left anterior fascicular block, no contiguous ST segment elevation/depressions or diffuse T wave inversions  - Troponin initially 39 and decreased to 37 on repeat  - D-dimer 3.31  - Influenza/flu/RSV swab negative  - Pelvis x-ray with osteoarthritis, no acute fracture  - CXR with bibasilar infiltrates greater on the left.  - CTA chest with no PE but did demonstrate  infiltrates greater on the left as well and debris-filled left lower lobe.  -Given Rocephin and azithromycin for community-acquired pneumonia      Past Medical History    No past medical history on file.    Past Surgical History   No past surgical history on file.    Prior to Admission Medications   Prior to Admission Medications   Prescriptions Last Dose Informant Patient Reported? Taking?   acetaminophen (TYLENOL) 500 MG tablet 12/30/2024 Evening  Yes Yes   Sig: Take 500 mg by mouth every evening.   donepezil (ARICEPT) 5 MG tablet 12/30/2024 Evening  Yes Yes   Sig: Take 5 mg by mouth every evening.   famciclovir (FAMVIR) 500 MG tablet 12/31/2024 Morning  Yes Yes   Sig: Take 500 mg by mouth 2 times daily.   famotidine (PEPCID) 20 MG tablet 12/31/2024 Morning  Yes Yes   Sig: Take 20 mg by mouth 2 times daily.   fluorometholone (FLAREX) 0.1 % ophthalmic suspension 12/31/2024 Morning  Yes Yes   Sig: Place 1 drop into the right eye every morning.   losartan (COZAAR) 25 MG tablet 12/31/2024 Morning  Yes Yes   Sig: Take 25 mg by mouth daily.   metoprolol succinate ER (TOPROL XL) 25 MG 24 hr tablet 12/31/2024 Morning  Yes Yes   Sig: Take 25 mg by mouth daily.   rosuvastatin (CRESTOR) 10 MG tablet 12/31/2024 Morning  Yes Yes   Sig: Take 10 mg by mouth daily.   tamsulosin (FLOMAX) 0.4 MG capsule 12/30/2024 Evening  Yes Yes   Sig: Take 0.4 mg by mouth every evening.   traZODone (DESYREL) 50 MG tablet 12/30/2024 Bedtime  Yes Yes   Sig: Take 12.5 mg by mouth at bedtime.      Facility-Administered Medications: None        Review of Systems    The 10 point Review of Systems is negative other than noted in the HPI or here.     Social History   I have reviewed this patient's social history and updated it with pertinent information if needed.         Family History     No family history of immunodeficiencies      Allergies   No Known Allergies     Physical Exam   Vital Signs: Temp: 98  F (36.7  C) Temp src: Temporal BP: (!) 174/77  Pulse: 77   Resp: 20 SpO2: 97 % O2 Device: None (Room air)    Weight: 145 lbs 0 oz    GENERAL: Lying in bed, no distress, appears stated age, coughing  HEENT: NC/AT, sclera anicteric   CV: RRR, no M/R/G, CR < 2 s   PULM: Normal respiratory effort, rales in the bilateral middle and lower lung fields  GI: Abd soft, NT, ND  MSK: WWP, mild LE edema   NEURO: Awake, alert, oriented to 1/1/2025, CN II-XII grossly intact, MORALES, appears nonfocal  SKIN: no rash           Medical Decision Making       61 MINUTES SPENT BY ME on the date of service doing chart review, history, exam, documentation & further activities per the note.      Data     I have personally reviewed the following data over the past 24 hrs:    11.6 (H)  \   11.9 (L)   / 324     143 107 22.0 /  133 (H)   4.5 26 0.97 \     Trop: 37 (H) BNP: 1,269     Procal: 0.31 CRP: N/A Lactic Acid: N/A       INR:  N/A PTT:  N/A   D-dimer:  3.14 (H) Fibrinogen:  N/A       Imaging results reviewed over the past 24 hrs:   Recent Results (from the past 24 hours)   Chest XR,  PA & LAT    Narrative    EXAM: XR CHEST 2 VIEWS  LOCATION: [facility]  DATE: 12/31/2024    INDICATION: Shortness of breath  COMPARISON: None.      Impression    IMPRESSION: Reinflation of both lungs. Mild to moderate amount of by basilar infiltrates greatest on the left. Minimal pleural fluid or thickening bilaterally greatest on the left. Minimally calcified thoracic aortic arch. Moderate hypertrophic changes   of the spine. Slight bilateral apical pleural scarring.   XR Pelvis 1/2 Views    Narrative    EXAM: XR PELVIS 1/2 VIEWS  LOCATION: [facility]  DATE: 12/31/2024    INDICATION: 2 weeks of atraumatic pelvic pain  COMPARISON: None.      Impression    IMPRESSION: No acute fracture or malalignment. Mild degenerative arthritis of both hips, slightly greater on the left. Multilevel degenerative changes lower lumbar spine. Osseous demineralization.   CT  Chest Pulmonary Embolism w Contrast    Narrative    EXAM: CT CHEST PULMONARY EMBOLISM W CONTRAST  LOCATION: [facility]  DATE: 12/31/2024    INDICATION: Two weeks of increased exertional shortness of breath, elevated dimer, rule out evidence of PE or other pathology.  COMPARISON: Chest x-ray 2 views 12/31/2024 at 2058 hours.  TECHNIQUE: CT chest pulmonary angiogram during arterial phase injection of IV contrast. Multiplanar reformats and MIP reconstructions were performed. Dose reduction techniques were used.   CONTRAST: 59 mL Isovue 370.    FINDINGS:  ANGIOGRAM CHEST: No pulmonary emboli on either side. Atherosclerotic normal caliber thoracic aorta without aneurysm or dissection. No CT evidence of right heart strain.    LUNGS AND PLEURA: Mild emphysematous changes. Scarring in the apices. Airspace infiltrates in both lungs, involving the right middle and both lower lobes, most apparent involving the left lower lobe. Debris-filled left lower lobe tubular bronchiectasis.   Small left and tiny right pleural effusions.    MEDIASTINUM/AXILLAE: Normal cardiac size. Moderate coronary artery calcifications. No pericardial effusion. No suspicious adenopathy. Trachea is midline.    CORONARY ARTERY CALCIFICATION: Moderate.    UPPER ABDOMEN: Cholelithiasis. Atherosclerotic changes.    MUSCULOSKELETAL: Degenerative changes both shoulders and the thoracic spine.      Impression    IMPRESSION:  1.  No pulmonary emboli on either side. Airspace infiltrates bilaterally, more apparent involving the left lower lobe. Debris-filled left lower lobe tubular bronchiectasis. Findings most likely represent an infectious or an inflammatory process. Small   left and tiny right pleural effusions.    2.  Atherosclerotic normal caliber thoracic aorta without aneurysm or dissection. Normal cardiac size. Moderate coronary artery calcifications.

## 2025-01-01 NOTE — PROGRESS NOTES
MD Notification    Notified Person: MD    Notified Person Name: Evelyn     Notification Date/Time: 1/1/25 at 4:25 pm    Notification Interaction: Vocera     Purpose of Notification: Pt BP high, 178/86, orders for Lopressor? Thanks!    Orders Received: IV Hydralazine PRN ordered     Comments:

## 2025-01-01 NOTE — PROGRESS NOTES
[facility]    Medicine Progress Note - Hospitalist Service    Date of Admission:  12/31/2024    Assessment & Plan      Riki Alvarez is a 89 year old male admitted on 12/31/2024. He has PMH most notable for HTN, HLD, cognitive impairment, GERD, BPH that presents with progressive generalized weakness and has findings concerning for community-acquired versus aspiration pneumonia.    #Suspected community-acquired versus aspiration pneumonia  The patient presents with productive cough, dyspnea on exertion and progressive generalized weakness.  On exam he is AF, RR 20, satting 97% on RA.  WBC 11.6.  COVID/RSV/flu swab negative.  CXR with bibasilar infiltrates greater on the left.  CTA chest with no PE but did demonstrate infiltrates greater on the left as well and debris-filled left lower lobe.  The patient endorses aspirating his secretions from time to time but not solids or liquids.  *Procalcitonin: 0.31  - Continue Rocephin and azithromycin started in the ED  - SLP consult  - RSV/flu/COVID swab negative  - Strep pneumo urinary antigen, Legionella urinary antigen, sputum culture and Gram stain to be collected  - Head of bed > 30 degrees  -Respiratory viral panel in process    #Left hip pain  #Generalized weakness  #Bilateral lower extremity pain  The patient presents with progressive generalized weakness, pain in his knees, lower extremity musculature, and left hip.  Denies recent falls.  On exam he is able to flex his hips and knees .  X-ray pelvis with no acute fracture but does demonstrate mild degenerative arthritis of both hips.  Differential includes osteoarthritis (he did previously receive steroid injections in his knees) v occult fracture.  Less likely polymyalgia rheumatica or rhabdomyolysis.  Alternatively he could just have progressive generalized weakness in the setting of pneumonia and debility.  *CK total 83  *TSH: 1.28  *Sed rate: 82  - CT left hip to be completed  -  PT/OT consults  - consult for discharge disposition planning  - Tylenol as needed for pain    #Elevated troponin  #Suspected pleurisy  #Noncardiac chest pain  The patient presents with several days of chest pain that is only present when he coughs or takes deep breaths. On exam he was asked to cough which did reproduce the pain.  No pain when he is not coughing.  EKG sinus rhythm with sinus arrhythmia, right bundle branch block, left anterior fascicular block.  No contiguous ST segment elevation/depressions or diffuse T wave inversions.  Troponin is mildly elevated and downtrending and likely due to demand ischemia in the setting of pneumonia.  CTA chest negative for PE.  No friction rubs on exam to suggest pericarditis.  *Troponin 39-->37  - Tylenol as needed    #Elevated D-dimer  - D-dimer significantly elevated at 3.31  - CTA chest negative for PE  -Bilateral LE ultrasound: No deep venous thrombosis in the bilateral lower extremities.   - Continue lovenox for DVT prophylaxis    #Grief  - The patient lost his wife in 10/2024.  He states he is depressed secondary to this but denies any suicidal ideation.    #Hyperglycemia  -A1c: 5.8   -BG 1/1/2025 with AM labs: 100  -Monitor glucose with labs for now    #HTN  -Continue PTA losartan 25 mg daily, metoprolol succinate 25 mg daily    #HLD  - Continue PTA Crestor 10 mg daily    #BPH  - Continue PTA Flomax    #Cognitive impairment  - Continue PTA donepezil       Observation Goals: -diagnostic tests and consults completed and resulted, -vital signs normal or at patient baseline, -weakness improving , Nurse to notify provider when observation goals have been met and patient is ready for discharge.  Diet: NPO for Medical/Clinical Reasons Except for: Ice Chips, Meds    DVT Prophylaxis: Pneumatic Compression Devices  Garcia Catheter: Not present  Lines: None     Cardiac Monitoring: None  Code Status: No CPR- Pre-arrest intubation OK      Clinically Significant Risk Factors  Present on Admission               # Hypoalbuminemia: Lowest albumin = 2.7 g/dL at 1/1/2025  6:25 AM, will monitor as appropriate  # Coagulation Defect: INR = 1.22 (Ref range: 0.85 - 1.15) and/or PTT = N/A, will monitor for bleeding    # Hypertension: Home medication list includes antihypertensive(s)      # Anemia: based on hgb <11                  Social Drivers of Health    Depression: At risk (11/19/2024)    Received from [email or web address]    PHQ-2     PHQ-2 TOTAL SCORE: 3   Tobacco Use: Medium Risk (11/19/2024)    Received from [email or web address]    Patient History     Smoking Tobacco Use: Former     Smokeless Tobacco Use: Never    Received from [email or web address]    Social Connections          Disposition Plan     Medically Ready for Discharge: Anticipated Tomorrow           The patient's care was discussed with the Attending Physician, Dr. Murguia .    Mehul Rivas NP  Hospitalist Service  [facility]  Securely message with [employer] (more info)  Text page via [employer] mobile Paging/Directory   ______________________________________________________________________    Interval History   I personally met with and examined Mr. Alvarez on 1/1/2025. No acute events overnight. He continues to report chest pain with coughing or deep inspiration, but denies at rest. Troponin is flat and no acute ischemic changes on EKG. He also denies any shortness of breath except with activity. No nausea, vomiting or diarrhea. LE ultrasound is negative for DVT.   On exam, Mr. Alvarez is alert, oriented and does not appear to be in any acute distress. No fever or chills. Bilateral rales appreciated, worse on the left, but no friction rub. HR is regular and he is hemodynamically stable.  He is overall quite weak and according to his daughter, he has barely been able to get out of bed for the past 10 days. PT and OT have been ordered.  Depending on how he does, he may require a short stay in TCU when he is medically ready for discharge.   There remains concern that his pneumonia is aspiration related, so plan is for speech to evaluate him today. Until then, will continue to keep him NPO except for meds and ice chips. However, his oral intake has reportedly been very poor for the past few weeks so I will start him on maintenance IV fluids.  Leukocytosis has resolved. Plan to continue IV antibiotics and transition to oral at time of discharge. Respiratory pathogen panel is in process. Repeat CBC and BMP have been ordered for 1/2/2025.       Physical Exam   Vital Signs: Temp: 98  F (36.7  C) Temp src: Temporal BP: (!) 152/64 Pulse: 69   Resp: 18 SpO2: 93 % O2 Device: None (Room air)    Weight: 145 lbs 0 oz  Physical Exam  Vitals reviewed.   Constitutional:       General: He is not in acute distress.     Appearance: He is not toxic-appearing.   Cardiovascular:      Rate and Rhythm: Normal rate and regular rhythm.      Pulses: Normal pulses.      Heart sounds: Normal heart sounds.   Pulmonary:      Effort: Pulmonary effort is normal.      Breath sounds: Rales present.   Abdominal:      General: Abdomen is flat. Bowel sounds are normal.      Palpations: Abdomen is soft.      Tenderness: There is no abdominal tenderness.   Skin:     General: Skin is warm and dry.   Neurological:      General: No focal deficit present.      Mental Status: He is alert.   Psychiatric:         Attention and Perception: Attention normal.         Mood and Affect: Mood normal.         Speech: Speech normal.         Behavior: Behavior normal. Behavior is cooperative.         Thought Content: Thought content normal.         Cognition and Memory: Cognition normal.         Judgment: Judgment normal.     Medical Decision Making   50 MINUTES SPENT BY ME on the date of service doing chart review, history, exam, documentation & further activities per the note.      Data     I have  personally reviewed the following data over the past 24 hrs:    8.4  \   10.5 (L)   / 286     141 107 15.0 /  100 (H)   4.1 26 0.77 \     ALT: 35 AST: 35 AP: 75 TBILI: 0.4   ALB: 2.7 (L) TOT PROTEIN: 5.6 (L) LIPASE: N/A     Trop: 37 (H) BNP: 1,269     TSH: 1.28 T4: N/A A1C: 5.8 (H)     Procal: 0.31 CRP: N/A Lactic Acid: N/A       INR:  1.22 (H) PTT:  N/A   D-dimer:  3.14 (H) Fibrinogen:  N/A       Imaging results reviewed over the past 24 hrs:   Recent Results (from the past 24 hours)   Chest XR,  PA & LAT    Narrative    EXAM: XR CHEST 2 VIEWS  LOCATION: [facility]  DATE: 12/31/2024    INDICATION: Shortness of breath  COMPARISON: None.      Impression    IMPRESSION: Reinflation of both lungs. Mild to moderate amount of by basilar infiltrates greatest on the left. Minimal pleural fluid or thickening bilaterally greatest on the left. Minimally calcified thoracic aortic arch. Moderate hypertrophic changes   of the spine. Slight bilateral apical pleural scarring.   XR Pelvis 1/2 Views    Narrative    EXAM: XR PELVIS 1/2 VIEWS  LOCATION: [facility]  DATE: 12/31/2024    INDICATION: 2 weeks of atraumatic pelvic pain  COMPARISON: None.      Impression    IMPRESSION: No acute fracture or malalignment. Mild degenerative arthritis of both hips, slightly greater on the left. Multilevel degenerative changes lower lumbar spine. Osseous demineralization.   CT Chest Pulmonary Embolism w Contrast    Narrative    EXAM: CT CHEST PULMONARY EMBOLISM W CONTRAST  LOCATION: [facility]  DATE: 12/31/2024    INDICATION: Two weeks of increased exertional shortness of breath, elevated dimer, rule out evidence of PE or other pathology.  COMPARISON: Chest x-ray 2 views 12/31/2024 at 2058 hours.  TECHNIQUE: CT chest pulmonary angiogram during arterial phase injection of IV contrast. Multiplanar reformats and MIP reconstructions were performed. Dose reduction techniques  were used.   CONTRAST: 59 mL Isovue 370.    FINDINGS:  ANGIOGRAM CHEST: No pulmonary emboli on either side. Atherosclerotic normal caliber thoracic aorta without aneurysm or dissection. No CT evidence of right heart strain.    LUNGS AND PLEURA: Mild emphysematous changes. Scarring in the apices. Airspace infiltrates in both lungs, involving the right middle and both lower lobes, most apparent involving the left lower lobe. Debris-filled left lower lobe tubular bronchiectasis.   Small left and tiny right pleural effusions.    MEDIASTINUM/AXILLAE: Normal cardiac size. Moderate coronary artery calcifications. No pericardial effusion. No suspicious adenopathy. Trachea is midline.    CORONARY ARTERY CALCIFICATION: Moderate.    UPPER ABDOMEN: Cholelithiasis. Atherosclerotic changes.    MUSCULOSKELETAL: Degenerative changes both shoulders and the thoracic spine.      Impression    IMPRESSION:  1.  No pulmonary emboli on either side. Airspace infiltrates bilaterally, more apparent involving the left lower lobe. Debris-filled left lower lobe tubular bronchiectasis. Findings most likely represent an infectious or an inflammatory process. Small   left and tiny right pleural effusions.    2.  Atherosclerotic normal caliber thoracic aorta without aneurysm or dissection. Normal cardiac size. Moderate coronary artery calcifications.   US Lower Extremity Venous Duplex Bilateral    Narrative    EXAM: ULTRASOUND LOWER EXTREMITY VENOUS DUPLEX BILATERAL  LOCATION: [facility]  DATE: 01/01/2025    INDICATION: Leg pain and edema, evaluate for DVT.  COMPARISON: None available.  TECHNIQUE: Venous Duplex ultrasound of bilateral lower extremities with and without compression, augmentation and duplex. Color flow and spectral Doppler with waveform analysis performed.    FINDINGS: Exam includes the common femoral, femoral, popliteal veins as well as segmentally visualized deep calf veins and greater saphenous vein.      RIGHT: No deep vein thrombosis. No superficial thrombophlebitis. No popliteal cyst.    LEFT: No deep vein thrombosis. No superficial thrombophlebitis. No popliteal cyst.      Impression    IMPRESSION:  1.  No deep venous thrombosis in the bilateral lower extremities.

## 2025-01-01 NOTE — PLAN OF CARE
Goal Outcome Evaluation:      Plan of Care Reviewed With: patient  PRIMARY Concern: L hip pain and pneumonia   SAFETY RISK Concerns (fall risk, behaviors, etc.): Fall       Aggression Tool Color: Green   Isolation/Type: None   Tests/Procedures for NEXT shift: None   Consults? (Pending/following, signed-off?) SW/OT/PT consulted.   Where is patient from? (Home, TCU, etc.): Home   Other Important info for NEXT shift: None   Anticipated DC date & active delays: TBD   _____________________________________________________________________________  SUMMARY NOTE:  Orientation/Cognitive: AOx4  Observation Goals (Met/ Not Met): Not met   Mobility Level/Assist Equipment: Not OOB   Antibiotics & Plan (IV/po, length of tx left): IV Rocephin and Azithromycin   Pain Management: Denies   Tele/VS/O2: VSS on RA except for HTN, MD paged for PRN's   ABNL Lab/BG: D-dimer 3.14; Trop 37; Hgb  10.5; Alb 2.7; Respiratory panel and Legionella labs in progress   Diet: Regular   Bowel/Bladder: Incontinent of urine, external cath in place   Skin Concerns: WNL   Drains/Devices: PIV infusing LR at 75 ml  Patient Stated Goal for Today: Rest

## 2025-01-01 NOTE — ED NOTES
"[facility]  ED Nurse Handoff Report    ED Chief complaint: Leg Pain and Cough      ED Diagnosis:   Final diagnoses:   None       Code Status: to be addressed by admitting doctor    Allergies: No Known Allergies    Patient Story: Pt c/o bilateral lower leg and R hip pain since shoveling sidewalk 2 weeks ago, also has bilateral upper chest pain with inhalation and cough  Focused Assessment:  Pt alert, voices needs. RR regular non-labored. Moves all extremities.     Treatments and/or interventions provided: IV access labs  Patient's response to treatments and/or interventions: tolerating well    To be done/followed up on inpatient unit:  doctor orders    Does this patient have any cognitive concerns?:  none    Activity level - Baseline/Home:  Independent  Activity Level - Current:   Independent    Patient's Preferred language: English   Needed?: No    Isolation: None  Infection: Not Applicable  Patient tested for COVID 19 prior to admission: NO  Bariatric?: No    Vital Signs:   Vitals:    12/31/24 1623   BP: 124/76   Pulse: 77   Resp: 18   Temp: 98  F (36.7  C)   TempSrc: Temporal   SpO2: 96%   Weight: 65.8 kg (145 lb)   Height: 1.778 m (5' 10\")       Cardiac Rhythm:     Was the PSS-3 completed:   Yes  What interventions are required if any?  none             Family Comments: daughter bedside  OBS brochure/video discussed/provided to patient/family: Yes              Name of person given brochure if not patient: Daughter              Relationship to patient: Daughter    For the majority of the shift this patient's behavior was Green.   Behavioral interventions performed were none.    ED NURSE PHONE NUMBER: [id number]        "

## 2025-01-01 NOTE — PHARMACY-ADMISSION MEDICATION HISTORY
Pharmacist Admission Medication History    Admission medication history is complete. The information provided in this note is only as accurate as the sources available at the time of the update.    Information Source(s): Patient, Family member, and CareEverywhere/SureScripts via in-person    Pertinent Information: No SureScript fill history for trazodone, but patient and niece states he take a quarter of a tablet at bedtime    Changes made to PTA medication list:  Added: all  Deleted: None  Changed: None    Allergies reviewed with patient and updates made in EHR: yes    Medication History Completed By: Mellissa Louis [facility] 12/31/2024 9:35 PM    PTA Med List   Medication Sig Last Dose/Taking    acetaminophen (TYLENOL) 500 MG tablet Take 500 mg by mouth every evening. 12/30/2024 Evening    donepezil (ARICEPT) 5 MG tablet Take 5 mg by mouth every evening. 12/30/2024 Evening    famciclovir (FAMVIR) 500 MG tablet Take 500 mg by mouth 2 times daily. 12/31/2024 Morning    famotidine (PEPCID) 20 MG tablet Take 20 mg by mouth 2 times daily. 12/31/2024 Morning    fluorometholone (FLAREX) 0.1 % ophthalmic suspension Place 1 drop into the right eye every morning. 12/31/2024 Morning    losartan (COZAAR) 25 MG tablet Take 25 mg by mouth daily. 12/31/2024 Morning    metoprolol succinate ER (TOPROL XL) 25 MG 24 hr tablet Take 25 mg by mouth daily. 12/31/2024 Morning    rosuvastatin (CRESTOR) 10 MG tablet Take 10 mg by mouth daily. 12/31/2024 Morning    tamsulosin (FLOMAX) 0.4 MG capsule Take 0.4 mg by mouth every evening. 12/30/2024 Evening    traZODone (DESYREL) 50 MG tablet Take 12.5 mg by mouth at bedtime. 12/30/2024 Bedtime

## 2025-01-01 NOTE — PROGRESS NOTES
Observation goals:    -diagnostic tests and consults completed and resulted- NOT MET  -vital signs normal or at patient baseline- NOT MET, HTN  -weakness improving- NOT MET, not OOB    Nurse to notify provider when observation goals have been met and patient is ready for discharge.

## 2025-01-01 NOTE — ED PROVIDER NOTES
"  Emergency Department Note      History of Present Illness     Chief Complaint   Leg Pain and Cough      HPI   Riki Alvarez is a 89 year old male with a history of hyperlipidemia who presents to the ED for the evaluation of leg pain and shortness of breath. The patient's niece reports that 2 weeks ago, he developed pain in both legs and the left hip after shoveling snow. Since then, his cough has become noticeably more persistent than it has been in the past, and he also seems short of breath on the phone with her. She thinks his ankles also look a little more swollen. The patient reports that his leg pain occurs only during walking. He endorses a more shortness of breath than usual and feels generally weak that is unsteady with ambulation. He has walked independently up until a couple weeks ago. He doesn't have leg pain as he stands briefly in the ED. He denies history of heart failure or other heart problems.    Independent Historian   Niece as detailed above.    Review of External Notes   N/A    Past Medical History     Medical History and Problem List   Anxiety  BPH  Hernia hiatal  Hyperlipidemia  Osteoarthritis knee L    Medications   famciclovir     tamsulosin  losartan  rosuvastatin  famotidine  metoprolol succinate  donepezil     Surgical History   Fistulotomy anal    Physical Exam     Patient Vitals for the past 24 hrs:   BP Temp Temp src Pulse Resp SpO2 Height Weight   12/31/24 2230 (!) 174/77 -- -- 77 20 97 % -- --   12/31/24 1623 124/76 98  F (36.7  C) Temporal 77 18 96 % 1.778 m (5' 10\") 65.8 kg (145 lb)     Physical Exam  General: Frail elderly appearance. Alert and cooperative with exam. Patient in mild distress. Normal mentation.  Head:  Scalp is NC/AT  Eyes:  No scleral icterus, PERRL  ENT:  The external nose and ears are normal. The oropharynx is normal and without erythema; mucus membranes are moist. Uvula midline, no evidence of deep space infection.  Neck:  Normal range of motion " without rigidity.  CV:  Regular rate and rhythm    No pathologic murmur   Resp:  Crackles in lung bases bilaterally    Non-labored, no retractions or accessory muscle use  GI:  Abdomen is soft, no distension, no tenderness. No peritoneal signs  MS:  No lower extremity edema   Skin:  Warm and dry, No rash or lesions noted.  Neuro: Oriented x 3. No gross motor deficits.      Diagnostics     Lab Results   Labs Ordered and Resulted from Time of ED Arrival to Time of ED Departure   BASIC METABOLIC PANEL - Abnormal       Result Value    Sodium 143      Potassium 4.5      Chloride 107      Carbon Dioxide (CO2) 26      Anion Gap 10      Urea Nitrogen 22.0      Creatinine 0.97      GFR Estimate 74      Calcium 9.2      Glucose 133 (*)    TROPONIN T, HIGH SENSITIVITY - Abnormal    Troponin T, High Sensitivity 39 (*)    CBC WITH PLATELETS AND DIFFERENTIAL - Abnormal    WBC Count 11.6 (*)     RBC Count 3.78 (*)     Hemoglobin 11.9 (*)     Hematocrit 36.3 (*)     MCV 96      MCH 31.5      MCHC 32.8      RDW 12.7      Platelet Count 324      % Neutrophils 80      % Lymphocytes 10      % Monocytes 6      % Eosinophils 1      % Basophils 0      % Immature Granulocytes 2      NRBCs per 100 WBC 0      Absolute Neutrophils 9.3 (*)     Absolute Lymphocytes 1.2      Absolute Monocytes 0.7      Absolute Eosinophils 0.1      Absolute Basophils 0.1      Absolute Immature Granulocytes 0.2      Absolute NRBCs 0.0     TROPONIN T, HIGH SENSITIVITY - Abnormal    Troponin T, High Sensitivity 37 (*)    D DIMER QUANTITATIVE - Abnormal    D-Dimer Quantitative 3.14 (*)    INFLUENZA A/B, RSV AND SARS-COV2 PCR - Normal    Influenza A PCR Negative      Influenza B PCR Negative      RSV PCR Negative      SARS CoV2 PCR Negative     NT PROBNP INPATIENT - Normal    N terminal Pro BNP Inpatient 1,269     PROCALCITONIN       Imaging   CT Chest Pulmonary Embolism w Contrast   Final Result   IMPRESSION:   1.  No pulmonary emboli on either side. Airspace  infiltrates bilaterally, more apparent involving the left lower lobe. Debris-filled left lower lobe tubular bronchiectasis. Findings most likely represent an infectious or an inflammatory process. Small    left and tiny right pleural effusions.      2.  Atherosclerotic normal caliber thoracic aorta without aneurysm or dissection. Normal cardiac size. Moderate coronary artery calcifications.      XR Pelvis 1/2 Views   Final Result   IMPRESSION: No acute fracture or malalignment. Mild degenerative arthritis of both hips, slightly greater on the left. Multilevel degenerative changes lower lumbar spine. Osseous demineralization.      Chest XR,  PA & LAT   Final Result   IMPRESSION: Reinflation of both lungs. Mild to moderate amount of by basilar infiltrates greatest on the left. Minimal pleural fluid or thickening bilaterally greatest on the left. Minimally calcified thoracic aortic arch. Moderate hypertrophic changes    of the spine. Slight bilateral apical pleural scarring.          EKG   ECG results from 12/31/24   EKG 12 lead     Value    Systolic Blood Pressure     Diastolic Blood Pressure     Ventricular Rate 80    Atrial Rate 80    KS Interval 168    QRS Duration 130        QTc 456    P Axis 25    R AXIS -53    T Axis 43    Interpretation ECG      Sinus rhythm with sinus arrhythmia  Right bundle branch block  Left anterior fascicular block  ** Bifascicular block **  Abnormal ECG  No previous ECGs available  Confirmed by GENERATED REPORT, COMPUTER ([id number]),  Stephanie Diehl ([id number]) on 12/31/2024 5:56:35 PM         Independent Interpretation   CXR: Bibasilar infiltrates without evidence of pneumothorax.    ED Course      Medications Administered   Medications   iopamidol (ISOVUE-370) solution 59 mL (59 mLs Intravenous $Given 12/31/24 2243)   Saline Flush (85 mLs Intravenous $Given 12/31/24 2244)   cefTRIAXone (ROCEPHIN) 1 g vial to attach to  mL bag for ADULTS or NS 50 mL bag for PEDS (1 g  Intravenous $New Bag 12/31/24 1179)   azithromycin (ZITHROMAX) 500 mg vial to attach to  mL bag (500 mg Intravenous $New Bag 12/31/24 8864)       Procedures   Procedures     Discussion of Management   Admitting Hospitalist, Dr. Ibanez    ED Course   ED Course as of 01/01/25 0130   e Dec 31, 2024   2000 I evaluated the patient and obtained history.        Additional Documentation  None    Medical Decision Making / Diagnosis     CMS Diagnoses: None    MIPS   CT for PE was ordered because the patient had an abnormal d-dimer.    [facility]   Riki Alvarez is a 89 year old male presents with bilateral leg pain, dyspnea on exertion, and generalized weakness.  Patient's medical history and records reviewed.  On evaluation patient is noted to be weak and has difficulty standing independently.  Labs, EKG, and imaging was obtained.  EKG is without evidence of acute ischemia or infarction.  Patient denies chest pain.  Labs notable for elevated troponin (39, repeat troponin 37), normal BNP, negative COVID/influenza/RSV testing, significantly elevated D-dimer (3.14), and mildly elevated white count (11.6).  Chest x-ray demonstrates bibasilar infiltrates.  CT of the chest was obtained secondary to elevated D-dimer and shows no evidence of PE though does demonstrate bilateral infiltrates which are more apparent in the left lower lobe concerning for potential infectious etiology.  Patient was initiated on ceftriaxone and azithromycin for treatment of pneumonia which is likely etiology to his presentation though additional underlying cardiac etiology is not completely excluded.  No emergent cause for patient's bilateral leg pain was determined.  Pelvis x-ray without significant findings and he was without significant pain or asymmetric swelling on exam; able to bear weight without pain.  Patient's work of breathing and oxygen saturations were normal at rest.  Given significant generalized weakness and patient's age, he was  admitted to observation with the hospitalist service for further evaluation and care.    Disposition   The patient was admitted to the hospital.     Diagnosis     ICD-10-CM    1. Pneumonia of both lungs due to infectious organism, unspecified part of lung  J18.9       2. General weakness  R53.1       3. Dyspnea on exertion  R06.09              Scribe Disclosure:  Lizet OHARA, am serving as a scribe at 8:14 PM on 12/31/2024 to document services personally performed by Estiven Flanagan DO based on my observations and the provider's statements to me.        Estiven Flanagan DO  01/01/25 0138

## 2025-01-01 NOTE — ED NOTES
Patient was adjusted in bed with team RN and depends was changed. Patient was boosted in bed and vitals taken

## 2025-01-01 NOTE — PROGRESS NOTES
RECEIVING UNIT ED HANDOFF REVIEW    ED Nurse Handoff Report was reviewed by: Keiko Carreno RN on January 1, 2025 at 11:25 AM

## 2025-01-01 NOTE — PROGRESS NOTES
01/01/25 1300   Appointment Info   Signing Clinician's Name / Credentials (SLP) Arabella Giraldo M.A. CCC-SLP   General Information   Onset of Illness/Injury or Date of Surgery 12/31/24   Referring Physician Kelechi Ibanez MD   Patient/Family Therapy Goal Statement (SLP) Pt denied having a swallowing goal, would just like to order a meal.   Pertinent History of Current Problem Riki Alvarez is a 89 year old male admitted on 12/31/2024. He has PMH most notable for HTN, HLD, cognitive impairment, GERD, BPH that presents with progressive generalized weakness and has findings concerning for community-acquired versus aspiration pneumonia.   General Observations Alert, cooperative   Type of Evaluation   Type of Evaluation Swallow Evaluation   Oral Motor   Oral Musculature generally intact   Structural Abnormalities none present   Mucosal Quality adequate   Dentition (Oral Motor)   Dentition (Oral Motor) natural dentition   Facial Symmetry (Oral Motor)   Facial Symmetry (Oral Motor) WNL   Lip Function (Oral Motor)   Lip Range of Motion (Oral Motor) WNL   Tongue Function (Oral Motor)   Tongue ROM (Oral Motor) WNL   Jaw Function (Oral Motor)   Jaw Function (Oral Motor) WNL   Facial Sensation   Facial Sensation WNL   Cough/Swallow/Gag Reflex (Oral Motor)   Soft Palate/Velum (Oral Motor) WNL   Gag Reflex (Oral Motor) not tested   Volitional Throat Clear/Cough (Oral Motor) WNL   Volitional Swallow (Oral Motor) WNL   Vocal Quality/Secretion Management (Oral Motor)   Vocal Quality (Oral Motor) WFL   Secretion Management (Oral Motor) WNL   General Swallowing Observations   Past History of Dysphagia no previous SLP intervention   Respiratory Support room air   Current Diet/Method of Nutritional Intake (General Swallowing Observations, NIS) NPO  (pending SLP eval)   Swallowing Evaluation Clinical swallow evaluation   Clinical Swallow Evaluation   Feeding Assistance no assistance needed   Clinical Swallow Evaluation  Textures Trialed thin liquids;solid foods   Clinical Swallow Eval: Thin Liquid Texture Trial   Mode of Presentation, Thin Liquids spoon;cup;self-fed   Volume of Liquid or Food Presented 8 oz thin H20   Oral Phase of Swallow WFL   Pharyngeal Phase of Swallow intact   Diagnostic Statement No overt signs or symptoms of aspiration   Clinical Swallow Evaluation: Solid Food Texture Trial   Mode of Presentation self-fed   Volume Presented 2 tenisha crackers   Oral Phase WFL   Pharyngeal Phase intact   Diagnostic Statement No overt signs or symptoms of aspiration   Esophageal Phase of Swallow   Patient reports or presents with symptoms of esophageal dysphagia No   Swallowing Recommendations   Diet Consistency Recommendations thin liquids (level 0);regular diet   Supervision Level for Intake patient independent   Recommended Feeding/Eating Techniques (Swallow Eval) maintain upright sitting position for eating   Medication Administration Recommendations, Swallowing (SLP) as tolerated   Instrumental Assessment Recommendations instrumental evaluation not recommended at this time   Clinical Impression   Criteria for Skilled Therapeutic Interventions Met (SLP Eval) Evaluation only   SLP Diagnosis WNL oropharyngeal swallowing mechanism   Risks & Benefits of therapy have been explained evaluation/treatment results reviewed;care plan/treatment goals reviewed;current/potential barriers reviewed;participants voiced agreement with care plan;participants included;patient   Clinical Impression Comments Clinical dysphagia eval completed per MD order. Oropharyngeal mechanism judged to be functionally intact with good strength, coordination and symmetry. No overt signs or symptoms of aspiration present wiht PO trials as detailed above. Recommend a regular texture diet and thin liquids. Given advanced age, would benefit from safety precautions: sit up, pace slowly, small bites/sips. SLP will sign off as oropharyngeal swallowing is  functionally intact and further tx does not appear warranted at this time.   SLP Total Evaluation Time   Eval: oral/pharyngeal swallow function, clinical swallow Minutes ([id number]) 20   SLP Discharge Planning   SLP Plan eval only   SLP Discharge Recommendation home   SLP Rationale for DC Rec WFL oropharyngeal swallowing ability; recommend pt continue baseline diet   SLP Brief overview of current status  Recommend a regular texture diet and thin liquids.   SLP Time and Intention   Total Session Time (sum of timed and untimed services) 20

## 2025-01-02 ENCOUNTER — APPOINTMENT (OUTPATIENT)
Dept: OCCUPATIONAL THERAPY | Facility: CLINIC | Age: OVER 89
DRG: 194 | End: 2025-01-02
Payer: MEDICARE

## 2025-01-02 ENCOUNTER — DOCUMENTATION ONLY (OUTPATIENT)
Dept: OTHER | Facility: CLINIC | Age: OVER 89
End: 2025-01-02
Payer: MEDICARE

## 2025-01-02 ENCOUNTER — APPOINTMENT (OUTPATIENT)
Dept: PHYSICAL THERAPY | Facility: CLINIC | Age: OVER 89
DRG: 194 | End: 2025-01-02
Payer: MEDICARE

## 2025-01-02 VITALS
BODY MASS INDEX: 21.68 KG/M2 | DIASTOLIC BLOOD PRESSURE: 68 MMHG | OXYGEN SATURATION: 96 % | TEMPERATURE: 98.7 F | HEIGHT: 70 IN | WEIGHT: 151.46 LBS | SYSTOLIC BLOOD PRESSURE: 150 MMHG | RESPIRATION RATE: 18 BRPM | HEART RATE: 75 BPM

## 2025-01-02 LAB
ANION GAP SERPL CALCULATED.3IONS-SCNC: 12 MMOL/L (ref 7–15)
BACTERIA SPT CULT: NORMAL
BUN SERPL-MCNC: 9.2 MG/DL (ref 8–23)
CALCIUM SERPL-MCNC: 9 MG/DL (ref 8.8–10.4)
CHLORIDE SERPL-SCNC: 101 MMOL/L (ref 98–107)
CREAT SERPL-MCNC: 0.7 MG/DL (ref 0.67–1.17)
EGFRCR SERPLBLD CKD-EPI 2021: 88 ML/MIN/1.73M2
ERYTHROCYTE [DISTWIDTH] IN BLOOD BY AUTOMATED COUNT: 12.4 % (ref 10–15)
GLUCOSE SERPL-MCNC: 107 MG/DL (ref 70–99)
GRAM STAIN RESULT: NORMAL
HCO3 SERPL-SCNC: 26 MMOL/L (ref 22–29)
HCT VFR BLD AUTO: 38.4 % (ref 40–53)
HGB BLD-MCNC: 12.9 G/DL (ref 13.3–17.7)
MCH RBC QN AUTO: 32.1 PG (ref 26.5–33)
MCHC RBC AUTO-ENTMCNC: 33.6 G/DL (ref 31.5–36.5)
MCV RBC AUTO: 96 FL (ref 78–100)
PLATELET # BLD AUTO: 384 10E3/UL (ref 150–450)
POTASSIUM SERPL-SCNC: 4.1 MMOL/L (ref 3.4–5.3)
RBC # BLD AUTO: 4.02 10E6/UL (ref 4.4–5.9)
SODIUM SERPL-SCNC: 139 MMOL/L (ref 135–145)
WBC # BLD AUTO: 11.7 10E3/UL (ref 4–11)

## 2025-01-02 PROCEDURE — G0378 HOSPITAL OBSERVATION PER HR: HCPCS

## 2025-01-02 PROCEDURE — 36415 COLL VENOUS BLD VENIPUNCTURE: CPT

## 2025-01-02 PROCEDURE — 97530 THERAPEUTIC ACTIVITIES: CPT | Mod: GO

## 2025-01-02 PROCEDURE — 99232 SBSQ HOSP IP/OBS MODERATE 35: CPT | Performed by: HOSPITALIST

## 2025-01-02 PROCEDURE — 97161 PT EVAL LOW COMPLEX 20 MIN: CPT | Mod: GP

## 2025-01-02 PROCEDURE — 250N000013 HC RX MED GY IP 250 OP 250 PS 637: Performed by: INTERNAL MEDICINE

## 2025-01-02 PROCEDURE — 120N000001 HC R&B MED SURG/OB

## 2025-01-02 PROCEDURE — 97116 GAIT TRAINING THERAPY: CPT | Mod: GP

## 2025-01-02 PROCEDURE — 97165 OT EVAL LOW COMPLEX 30 MIN: CPT | Mod: GO

## 2025-01-02 PROCEDURE — 85027 COMPLETE CBC AUTOMATED: CPT

## 2025-01-02 PROCEDURE — 97530 THERAPEUTIC ACTIVITIES: CPT | Mod: GP

## 2025-01-02 PROCEDURE — 250N000011 HC RX IP 250 OP 636: Performed by: INTERNAL MEDICINE

## 2025-01-02 PROCEDURE — 80048 BASIC METABOLIC PNL TOTAL CA: CPT

## 2025-01-02 RX ADMIN — METOPROLOL SUCCINATE 25 MG: 25 TABLET, EXTENDED RELEASE ORAL at 09:04

## 2025-01-02 RX ADMIN — ENOXAPARIN SODIUM 40 MG: 40 INJECTION SUBCUTANEOUS at 09:04

## 2025-01-02 RX ADMIN — TRAZODONE HYDROCHLORIDE 12.5 MG: 50 TABLET ORAL at 01:17

## 2025-01-02 RX ADMIN — TRAZODONE HYDROCHLORIDE 12.5 MG: 50 TABLET ORAL at 23:35

## 2025-01-02 RX ADMIN — AZITHROMYCIN DIHYDRATE 250 MG: 250 TABLET ORAL at 22:56

## 2025-01-02 RX ADMIN — TAMSULOSIN HYDROCHLORIDE 0.4 MG: 0.4 CAPSULE ORAL at 19:41

## 2025-01-02 RX ADMIN — FAMOTIDINE 20 MG: 20 TABLET ORAL at 19:41

## 2025-01-02 RX ADMIN — CEFTRIAXONE SODIUM 2 G: 2 INJECTION, POWDER, FOR SOLUTION INTRAMUSCULAR; INTRAVENOUS at 22:56

## 2025-01-02 RX ADMIN — ROSUVASTATIN CALCIUM 10 MG: 10 TABLET, FILM COATED ORAL at 09:04

## 2025-01-02 RX ADMIN — LOSARTAN POTASSIUM 25 MG: 25 TABLET, FILM COATED ORAL at 09:04

## 2025-01-02 RX ADMIN — DONEPEZIL HYDROCHLORIDE 5 MG: 5 TABLET, FILM COATED ORAL at 19:41

## 2025-01-02 RX ADMIN — FAMOTIDINE 20 MG: 20 TABLET ORAL at 09:04

## 2025-01-02 ASSESSMENT — ACTIVITIES OF DAILY LIVING (ADL)
ADLS_ACUITY_SCORE: 50
ADLS_ACUITY_SCORE: 50
ADLS_ACUITY_SCORE: 56
ADLS_ACUITY_SCORE: 46
ADLS_ACUITY_SCORE: 46
ADLS_ACUITY_SCORE: 56
ADLS_ACUITY_SCORE: 46
ADLS_ACUITY_SCORE: 56
ADLS_ACUITY_SCORE: 56
ADLS_ACUITY_SCORE: 50
ADLS_ACUITY_SCORE: 46
ADLS_ACUITY_SCORE: 46
ADLS_ACUITY_SCORE: 50
ADLS_ACUITY_SCORE: 56
ADLS_ACUITY_SCORE: 46
ADLS_ACUITY_SCORE: 46
ADLS_ACUITY_SCORE: 50
ADLS_ACUITY_SCORE: 46
ADLS_ACUITY_SCORE: 50
ADLS_ACUITY_SCORE: 46
ADLS_ACUITY_SCORE: 50

## 2025-01-02 NOTE — PROGRESS NOTES
PRIMARY Concern: L hip pain and pneumonia   SAFETY RISK Concerns (fall risk, behaviors, etc.): Fall       Aggression Tool Color: Green   Isolation/Type: None   Tests/Procedures for NEXT shift: None   Consults? (Pending/following, signed-off?) SW/OT/PT consulted.   Where is patient from? (Home, TCU, etc.): Home   Other Important info for NEXT shift: None   Anticipated DC date & active delays: TBD   _____________________________________________________________________________  SUMMARY NOTE:  Orientation/Cognitive: A/Ox4  Observation Goals (Met/ Not Met): Not met   Mobility Level/Assist Equipment: Not OOB   Antibiotics & Plan (IV/po, length of tx left): IV Rocephin and Azithromycin   Pain Management: Denies   Tele/VS/O2: VSS on RA except for HTN  ABNL Lab/BG: + S pneumoniae antigen  Diet: Regular   Bowel/Bladder: Incontinent of urine, external cath in place   Skin Concerns: WNL   Drains/Devices: PIV SL  Patient Stated Goal for Today: Get up

## 2025-01-02 NOTE — PLAN OF CARE
Goal Outcome Evaluation:      Plan of Care Reviewed With: patient, family          Outcome Evaluation: Discharge planning/TCU placement

## 2025-01-02 NOTE — PLAN OF CARE
Goal Outcome Evaluation:      Plan of Care Reviewed With: patient    PRIMARY Concern: L hip pain and pneumonia   SAFETY RISK Concerns (fall risk, behaviors, etc.): Fall       Aggression Tool Color: Green   Isolation/Type: None   Tests/Procedures for NEXT shift: None   Consults? (Pending/following, signed-off?) SW/OT consulted.   Where is patient from? (Home, TCU, etc.): Home   Other Important info for NEXT shift: None   Anticipated DC date & active delays: TBD, PT recommending TCU  _____________________________________________________________________________  SUMMARY NOTE:  Orientation/Cognitive: AOx4, forgetful  Observation Goals (Met/ Not Met): Not met   Mobility Level/Assist Equipment: A1/GB/W  Antibiotics & Plan (IV/po, length of tx left): IV Rocephin and Azithromycin   Pain Management: Denies   Tele/VS/O2: VSS on RA except for HTN, PRN available   ABNL Lab/BG: WBC 11.7; UA positive for S pneumoniae antigen  Diet: Regular   Bowel/Bladder: Continent, voiding using urinal   Skin Concerns: WNL   Drains/Devices: PIV SL  Patient Stated Goal for Today: Rest

## 2025-01-02 NOTE — PROGRESS NOTES
"MD Notification    Notified Person: MD    Notified Person Name: Evelyn     Notification Date/Time: 1/1/25 at 6:53 pm     Notification Interaction: Vocera     Purpose of Notification: Critical lab- UA Positive for Streptococcus pneumoniae antigen. Please advise, thank you.    Orders Received: No new orders at this time    Comments: \"Pt is already on Rocephin\"   "

## 2025-01-02 NOTE — CONSULTS
Care Management Initial Consult    General Information  Assessment completed with: Patient, Family, Stefania Escalona  Type of CM/SW Visit: Initial Assessment    Primary Care Provider verified and updated as needed: Yes   Readmission within the last 30 days: no previous admission in last 30 days      Reason for Consult: discharge planning  Advance Care Planning: Advance Care Planning Reviewed: other (see comments) (Family provided sent to [facility])          Communication Assessment  Patient's communication style: spoken language (English or Bilingual)    Hearing Difficulty or Deaf: no        Cognitive  Cognitive/Neuro/Behavioral: WDL  Level of Consciousness: alert  Arousal Level: opens eyes spontaneously  Orientation: oriented x 4  Mood/Behavior: calm, cooperative  Best Language: 0 - No aphasia  Speech: clear, spontaneous, logical    Living Environment:   People in home: alone     Current living Arrangements: house      Able to return to prior arrangements: yes       Family/Social Support:  Care provided by: self  Provides care for: no one  Marital Status:   Support system: Other (specify) (nieces)          Description of Support System: Involved, Supportive    Support Assessment: Adequate family and caregiver support, Adequate social supports    Current Resources:   Patient receiving home care services: No        Community Resources:    Equipment currently used at home: cane, straight, walker, rolling  Supplies currently used at home:      Employment/Financial:  Employment Status:          Financial Concerns:             Does the patient's insurance plan have a 3 day qualifying hospital stay waiver?  No    Lifestyle & Psychosocial Needs:  Social Drivers of Health     Food Insecurity: No Food Insecurity (6/29/2022)    Received from [facility]    Food Insecurity     Worried About Running Out of Food in the Last Year: 1   Depression: At risk (11/19/2024)    Received from  [facility] [employer] [facility]    PHQ-2     PHQ-2 TOTAL SCORE: 3   Housing Stability: Low Risk  (6/29/2022)    Received from [facility] [employer] [facility]    Housing Stability     Unable to Pay for Housing in the Last Year: 1   Tobacco Use: Medium Risk (11/19/2024)    Received from [facility] [employer] [facility]    Patient History     Smoking Tobacco Use: Former     Smokeless Tobacco Use: Never     Passive Exposure: Not on file   Financial Resource Strain: Low Risk  (6/29/2022)    Received from [facility] [employer] [facility]    Financial Resource Strain     Difficulty of Paying Living Expenses: 3     Difficulty of Paying Living Expenses: Not on file   Alcohol Use: Not on file   Transportation Needs: No Transportation Needs (6/29/2022)    Received from [facility] [employer] [facility]    Transportation Needs     Lack of Transportation (Medical): 1   Physical Activity: Not on file   Interpersonal Safety: Not on file   Stress: Not on file   Social Connections: Unknown (7/1/2023)    Received from [facility] [employer] [facility]    Social Connections     Frequency of Communication with Friends and Family: Not on file   Health Literacy: Not on file       Functional Status:  Prior to admission patient needed assistance:              Mental Health Status:          Chemical Dependency Status:                Values/Beliefs:  Spiritual, Cultural Beliefs, Hindu Practices, Values that affect care:                 Discussed  Partnership in Safe Discharge Planning  document with patient/family: No    Additional Information:  SW/care coordinator consult for discharge planning. Writer met with patient and santana Campo) at bedside and introduced self and role. Patient is a 89 year old  male who admitted for generalized weakness, pain in his knees, lower extremity musculature, and left hip.    Patient lives alone in is house with a  cat. Is a recent  (wife passed away October 2024) who handles most of his own cares. Patient never used homecare services. [address] patient if/when needed. Patient's PCP is Dr. Karolyn Aiken at [facility]. When asked about a Health Care Directive, Stefania provided the directive and POA documentation, Writer sent it to [facility] to add it to patient's chart.     Writer discussed with patient and Stefania TCU. Patient and Stefania discussed the purpose of TCU amongst each other and how it benefited other family members. Patient was open to TCU then. Stefania strongly requested [facility] as their first choice, [facility] and [facility]. Referrals sent to all. Writer asked about transportation. Stefania stated family will be able to transport once that time comes.  will continue to follow through discharge planning.      Next Steps: TCU Placement    Jana RESENDIZ  [facility]  Inpatient Care Coordination

## 2025-01-02 NOTE — PROGRESS NOTES
PRIMARY Concern: L hip pain and pneumonia   SAFETY RISK Concerns (fall risk, behaviors, etc.): Fall       Aggression Tool Color: Green   Isolation/Type: None   Tests/Procedures for NEXT shift: None   Consults? (Pending/following, signed-off?) SW/OT/PT consulted.   Where is patient from? (Home, TCU, etc.): Home   Other Important info for NEXT shift: None   Anticipated DC date & active delays: TBD   _____________________________________________________________________________  SUMMARY NOTE:  Orientation/Cognitive: AOx4  Observation Goals (Met/ Not Met): Not met   Mobility Level/Assist Equipment: Not OOB   Antibiotics & Plan (IV/po, length of tx left): IV Rocephin and Azithromycin   Pain Management: Denies   Tele/VS/O2: VSS on RA except for HTN, MD paged for PRN's   ABNL Lab/BG: D-dimer 3.14; Trop 37; Hgb  10.5; Alb 2.7;  Diet: Regular   Bowel/Bladder: Incontinent of urine, external cath in place   Skin Concerns: WNL   Drains/Devices: PIV infusing LR at 75 ml  Patient Stated Goal for Today: Rest

## 2025-01-02 NOTE — PROGRESS NOTES
01/02/25 1043   Appointment Info   Signing Clinician's Name / Credentials (OT) Eron Dawn, OTR/L   Living Environment   People in Home alone   Current Living Arrangements house   Home Accessibility stairs within home   Number of Stairs, Within Home, Primary four   Stair Railings, Within Home, Primary railings safe and in good condition   Transportation Anticipated family or friend will provide   Living Environment Comments Patient has a stair lift part of the way to the second level that takes him to a landing, then he has to go up 4 stairs. He has a tub/shower that he cannot get into anymore. His nieces drive for him.   Self-Care   Usual Activity Tolerance moderate   Current Activity Tolerance fair   Regular Exercise No   Equipment Currently Used at Home cane, straight;walker, rolling   Fall history within last six months no   Activity/Exercise/Self-Care Comment Patient reports using cane most of the time, but has a FWW and 4WW to use if needed. He no longer drives. Niece takes him to the grocery store on Sundays. He does his own simple meal prep and medications. Sponge bathes at the sink.   General Information   Onset of Illness/Injury or Date of Surgery 12/31/24   Referring Physician Mehul Rivas, NP   Patient/Family Therapy Goal Statement (OT) To go home   Additional Occupational Profile Info/Pertinent History of Current Problem Riki Alvarez is a 89 year old male admitted on 12/31/2024. He has PMH most notable for HTN, HLD, cognitive impairment, GERD, BPH that presents with progressive generalized weakness and has findings concerning for community-acquired versus aspiration pneumonia.   Existing Precautions/Restrictions fall   Limitations/Impairments safety/cognitive   Cognitive Status Examination   Follows Commands follows one-step commands;75-90% accuracy;physical/tactile prompts required;repetition of directions required   Cognitive Status Comments Patient is oriented to self,  year, and month, some confusion regarding place. States he is in an apartment in [place].   Cognitive Screens/Assessments   Cognitive Assessments Completed Blessed Orientation-Memory-Concentration   Blessed Orientation-Memory-Concentration Test:  Total Weighted Score out of 28 8   Blessed Orientation-Memory-Concentration Test Norms 0-8 equals normal to mild impairment   Blessed Orientation-Memory-Concentration Interpretation Patient is oriented to time of day, month, and year. Recalling 4/5 name and address components after a delay. Patient makes 1 error on reverse counting 20-1. Makes 3 errors on naming months of year in reverse order.   Range of Motion Comprehensive   General Range of Motion bilateral upper extremity ROM WFL   Strength Comprehensive (MMT)   Comment, General Manual Muscle Testing (MMT) Assessment generalized weakness noted with functional mobility and ADL   Transfers   Transfers sit-stand transfer;toilet transfer   Sit-Stand Transfer   Sit-Stand [place] (Transfers) contact guard   Toilet Transfer   [place] Level (Toilet Transfer) contact guard   Activities of Daily Living   BADL Assessment/Intervention lower body dressing;grooming;toileting   Lower Body Dressing Assessment/Training   [place] Level (Lower Body Dressing) minimum assist (75% patient effort)   Grooming Assessment/Training   [place] Level (Grooming) contact guard assist   Toileting   [place] Level (Toileting) contact guard assist   Clinical Impression   Criteria for Skilled Therapeutic Interventions Met (OT) Yes, treatment indicated   OT Diagnosis decline in function   OT Problem List-Impairments impacting ADL problems related to;activity tolerance impaired;balance;cognition;mobility;strength   Assessment of Occupational Performance 5 or more Performance Deficits   Identified Performance Deficits dressing, bathing, toileting, functional mobility, home mgmt, bill pay, meal prep, med mgmt, etc   Planned  Therapy Interventions (OT) ADL retraining;cognition;IADL retraining;strengthening;transfer training   Clinical Decision Making Complexity (OT) problem focused assessment/low complexity   Risk & Benefits of therapy have been explained evaluation/treatment results reviewed;care plan/treatment goals reviewed;risks/benefits reviewed;current/potential barriers reviewed;participants voiced agreement with care plan;participants included;patient   OT Total Evaluation Time   OT Eval, Low Complexity Minutes (82997) 10   OT Goals   Therapy Frequency (OT) 6 times/week   OT Predicted Duration/Target Date for Goal Attainment 01/22/25   OT Goals Hygiene/Grooming;Upper Body Dressing;Lower Body Dressing;Toilet Transfer/Toileting;Cognition   OT: Hygiene/Grooming modified independent   OT: Upper Body Dressing Modified independent   OT: Lower Body Dressing Modified independent   OT: Toilet Transfer/Toileting Modified independent   OT: Cognitive Patient/caregiver will verbalize understanding of cognitive assessment results/recommendations as needed for safe discharge planning   Interventions   Interventions Quick Adds Therapeutic Activity   Therapeutic Activities   Therapeutic Activity Minutes (23268) 23   Symptoms noted during/after treatment fatigue   Treatment Detail/Skilled Intervention Pt seated in chair at encounter, agreeable to OT. STS from chair w/ CGA and FWW. pt ambulating to and from bathroom twice. CGA-min A w/ FWW. Cues for FWW position w/ navigating in and out of bathroom space. Pivots and completes toilet transfer w/ CGA and tactile + verbal cues for technique to reach for support prior to sitting. Has 2 LOBs, requiring min A to correct. Recommening RTS for greater ease and IND w/ toileting. pt sitting back into chair x2, once after each bout of mobility to bathroom. Requires cues each time to reach back and control descent. Provided brief education on cognition screen and recommendation for increased assist for IADL  such as med mgmt, driving, and bill pay. Provided reorientation on current place and TCU rec at this time.   OT Discharge Planning   OT Plan [facility], standing g/h, dress w/ retrieval   OT Discharge Recommendation (DC Rec) Transitional Care Facility   OT Rationale for DC Rec Patient presents below baseline and is limited by decreased activity tolerance, weakness, and cognitive deficits. Patient is typically independent with mobility and ADL at baseline, now requiring Ax1. Recommend TCU to progress independence. Pt currently declining TCU, if returing home recommend Ax1.   Total Session Time   Timed Code Treatment Minutes 23   Total Session Time (sum of timed and untimed services) 33

## 2025-01-02 NOTE — PROVIDER NOTIFICATION
MD Notification    Notified Person: MD    Notified Person Name:  Phyllis    Notification Date/Time: 1/1/25 2719    Notification Interaction: [employer]     Purpose of Notification: Pt requesting for trazodone for sleep.     Orders Received: Pending    Comments:

## 2025-01-02 NOTE — PLAN OF CARE
Goal Outcome Evaluation:      Observation goals  PRIOR TO DISCHARGE        Comments: -diagnostic tests and consults completed and resulted- Not Met- Pending PT/OT  -vital signs normal or at patient baseline- Partially Met- Hypertensive to 170's- PRN hydralazine available  -weakness improving- Not Met  Nurse to notify provider when observation goals have been met and patient is ready for discharge.

## 2025-01-02 NOTE — PROGRESS NOTES
01/02/25 0942   Appointment Info   Signing Clinician's Name / Credentials (PT) Tita Guerra, PT, DPT   Living Environment   People in Home alone   Current Living Arrangements house   Home Accessibility stairs within home   Number of Stairs, Within Home, Primary four   Stair Railings, Within Home, Primary railings safe and in good condition   Transportation Anticipated family or friend will provide   Living Environment Comments Patient has a stair lift part of the way to the second level that takes him to a landing, then he has to go up 4 stairs. He has a tub/shower that he cannot get into anymore. His nieces drive for him.   Self-Care   Usual Activity Tolerance moderate   Current Activity Tolerance fair   Regular Exercise No   Equipment Currently Used at Home cane, straight;walker, rolling   Fall history within last six months no   Activity/Exercise/Self-Care Comment Patient reports intermittent use of cane to ambulate, but has a FWW and 4WW to use if needed. He no longer drives. Niece takes him to the grocery store on Sundays. He does his own cooking and meal prep. Spong bathes at the sink.   General Information   Onset of Illness/Injury or Date of Surgery 12/31/24   Referring Physician Mehul Rivas, NP   Patient/Family Therapy Goals Statement (PT) to go back home   Pertinent History of Current Problem (include personal factors and/or comorbidities that impact the POC) Patient is 89 YO M who presented to hospital with pneumonia and generalized weakness. PMH includes  HTN, HLD, cognitive impairment, GERD, BPH   Existing Precautions/Restrictions fall   General Observations Patient sitting up in bed, agreeable to PT. Hard of hearing.   Cognition   Affect/Mental Status (Cognition) WFL   Orientation Status (Cognition) oriented x 4   Follows Commands (Cognition) WFL;repetition of directions required   Safety Deficit (Cognition) insight into deficits/self-awareness   Pain Assessment   Patient  Currently in Pain No   Integumentary/Edema   Integumentary/Edema no deficits were identifed   Posture    Posture Forward head position;Protracted shoulders;Kyphosis   Range of Motion (ROM)   Range of Motion ROM is WFL   Strength (Manual Muscle Testing)   Strength (Manual Muscle Testing) Deficits observed during functional mobility   Strength Comments Functional weakness noted in legs during transfers   Bed Mobility   Comment, (Bed Mobility) Supine <> sit SBA   Transfers   Comment, (Transfers) Sit to stand from EOB with FWW and Min A   Gait/Stairs (Locomotion)   [place] Level (Gait) minimum assist (75% patient effort)   Assistive Device (Gait) walker, front-wheeled   Distance in Feet (Gait) 15' during eval   Comment, (Gait/Stairs) Patient ambulate in room, unsteady navigating around room requiring Min A.   Balance   Balance Comments SBA for sitting balance with intemittent posterior imbalance needing to use hands to support trunk; Imbalances ambulating despite support of FWW   Sensory Examination   Sensory Perception patient reports no sensory changes   Coordination   Coordination no deficits were identified   Muscle Tone   Muscle Tone no deficits were identified   Clinical Impression   Criteria for Skilled Therapeutic Intervention Yes, treatment indicated   PT Diagnosis (PT) Impaired mobility   Influenced by the following impairments Weakness, Impaired balance, Decreased activity tolerance   Functional limitations due to impairments Increased difficulty with functional transfers and ambulation   Clinical Presentation (PT Evaluation Complexity) stable   Clinical Presentation Rationale Medical status, clinical judgement   Clinical Decision Making (Complexity) low complexity   Planned Therapy Interventions (PT) balance training;gait training;patient/family education;stair training;strengthening;transfer training;progressive activity/exercise   Risk & Benefits of therapy have been explained evaluation/treatment  results reviewed;care plan/treatment goals reviewed;risks/benefits reviewed;current/potential barriers reviewed;participants voiced agreement with care plan;participants included;patient   PT Total Evaluation Time   PT Eval, Low Complexity Minutes (78446) 10   Physical Therapy Goals   PT Frequency Daily   PT Predicted Duration/Target Date for Goal Attainment 01/08/25   PT Goals Transfers;Gait;Stairs;Bed Mobility   PT: Bed Mobility Independent;Supine to/from sit  (from HOB flat)   PT: Transfers Modified independent;Sit to/from stand;Bed to/from chair;Assistive device  (FWW)   PT: Gait Modified independent;Rolling walker;100 feet   PT: Stairs Modified independent;4 stairs;Rail on right   Interventions   Interventions Quick Adds Gait Training;Therapeutic Activity   Therapeutic Activity   Therapeutic Activities: dynamic activities to improve functional performance Minutes (52132) 9   Symptoms Noted During/After Treatment Fatigue   Treatment Detail/Skilled Intervention Following eval, patient agreeable to PT treatment. Trunk sway sitting on EOB, improved following cues for scooting hips closer to EOB. Sit to stand from EOB with cues for improved set up and hand placement, after 3 attempts needed Min A to complete. Stand to sit into chair with decreased eccentric control and CGA. Repeated sit <> stand following ambulation from chair x 2 reps with CGA progressing to SBA. Patient sitting up in chair at end of session, alarm activated. Updated RN Keiko on mobility recommendations.   Gait Training   Gait Training Minutes (59040) 11   Symptoms Noted During/After Treatment (Gait Training) fatigue   Treatment Detail/Skilled Intervention Patient ambulated with FWW and gait belt donned. Initially requiring intermittent min A for balance, especially during turns and navigating around furniture, verbal cues for walker safety. With increased time ambulating, patient progressed to CGA.   Distance in Feet 280'   PT Discharge Planning    PT Plan [place] with transfers from various surfaces, safety with ambulation, stairs   PT Discharge Recommendation (DC Rec) Transitional Care Facility   PT Rationale for DC Rec Patient is below baseline level of independence with mobility and lives alone in a two story house. He is requiring Ax1 during transfers and ambulation with support of a walker. He will benefit from skilled PT intervention at TCU to progress strength,  balance and independence wiht mobility. Of note, Patient currently does not want to TCU. If patient were to d/c home he would need Ax1 and HHPT.   PT Brief overview of current status CGA to Min Ax 1; Goals of therapy will be to address safe mobility and make recs for d/c to next level of care. Pt and RN will continue to follow all falls risk precautions as documented by RN staff while hospitalized.   PT Equipment Needed at Discharge walker, rolling  (has at home)   Physical Therapy Time and Intention   Timed Code Treatment Minutes 20   Total Session Time (sum of timed and untimed services) 30

## 2025-01-02 NOTE — PROGRESS NOTES
[facility]    Hospitalist Progress Note    Interval History   Patient is awake and alert.  Currently on room air.  Hip pain and chest pain improved.  Participating in therapy.  Quite fatigued.  Lives alone.    -Data reviewed today: I reviewed all new labs and imaging results over the last 24 hours. I personally reviewed the CT hip image(s) showing no acute findings .    Physical Exam   Temp: 97.7  F (36.5  C) Temp src: Oral BP: 121/47 Pulse: 68   Resp: 18 SpO2: 97 % O2 Device: None (Room air)    Vitals:    12/31/24 1623 01/01/25 1144   Weight: 65.8 kg (145 lb) 68.7 kg (151 lb 7.3 oz)     Vital Signs with Ranges  Temp:  [97.7  F (36.5  C)-99.3  F (37.4  C)] 97.7  F (36.5  C)  Pulse:  [68-86] 68  Resp:  [18] 18  BP: (121-178)/(47-86) 121/47  SpO2:  [95 %-97 %] 97 %  I/O last 3 completed shifts:  In: -   Out: 1700 [Urine:1700]    Physical Exam  Constitutional:       Comments: Old and frail   Cardiovascular:      Rate and Rhythm: Normal rate and regular rhythm.      Pulses: Normal pulses.      Heart sounds: Normal heart sounds.   Pulmonary:      Effort: Pulmonary effort is normal. No respiratory distress.      Breath sounds: Normal breath sounds.      Comments: Bilateral basal crackles  Abdominal:      General: Abdomen is flat. Bowel sounds are normal. There is no distension.      Tenderness: There is no abdominal tenderness. There is no guarding.   Skin:     General: Skin is warm and dry.   Neurological:      General: No focal deficit present.           Medications   Current Facility-Administered Medications   Medication Dose Route Frequency Provider Last Rate Last Admin     Current Facility-Administered Medications   Medication Dose Route Frequency Provider Last Rate Last Admin    azithromycin (ZITHROMAX) tablet 250 mg  250 mg Oral At Bedtime Kelechi Ibanez MD   250 mg at 01/01/25 2113    cefTRIAXone (ROCEPHIN) 2 g vial to attach to  ml bag for ADULTS or NS 50 ml bag for PEDS  2  g Intravenous Q24H Kelechi Ibanez MD   2 g at 01/01/25 2240    donepezil (ARICEPT) tablet 5 mg  5 mg Oral QPM Kelechi Ibanez MD   5 mg at 01/01/25 2113    enoxaparin ANTICOAGULANT (LOVENOX) injection 40 mg  40 mg Subcutaneous Q24H Kelechi Ibanez MD   40 mg at 01/02/25 0904    famotidine (PEPCID) tablet 20 mg  20 mg Oral BID Kelechi Ibanez MD   20 mg at 01/02/25 0904    losartan (COZAAR) tablet 25 mg  25 mg Oral Daily Kelechi Ibanez MD   25 mg at 01/02/25 0904    metoprolol succinate ER (TOPROL XL) 24 hr tablet 25 mg  25 mg Oral Daily Kelechi Ibanez MD   25 mg at 01/02/25 0904    rosuvastatin (CRESTOR) tablet 10 mg  10 mg Oral Daily Kelechi Ibanez MD   10 mg at 01/02/25 0904    tamsulosin (FLOMAX) capsule 0.4 mg  0.4 mg Oral QPM Kelechi Ibanez MD   0.4 mg at 01/01/25 1930       Data   Recent Labs   Lab 01/02/25  0957 01/01/25  0625 12/31/24  1704   WBC 11.7* 8.4 11.6*   HGB 12.9* 10.5* 11.9*   MCV 96 95 96    286 324   INR  --  1.22*  --     141 143   POTASSIUM 4.1 4.1 4.5   CHLORIDE 101 107 107   CO2 26 26 26   BUN 9.2 15.0 22.0   CR 0.70 0.77 0.97   ANIONGAP 12 8 10   MARITZA 9.0 8.4* 9.2   * 100* 133*   ALBUMIN  --  2.7*  --    PROTTOTAL  --  5.6*  --    BILITOTAL  --  0.4  --    ALKPHOS  --  75  --    ALT  --  35  --    AST  --  35  --        No results found for this or any previous visit (from the past 24 hours).      Assessment & Plan      Riki Alvarez is a 89 year old male admitted on 12/31/2024. He has PMH most notable for HTN, HLD, cognitive impairment, GERD, BPH that presents with progressive generalized weakness and has findings concerning for community-acquired versus aspiration pneumonia.     # Community-acquired pneumonia with streptococcal pneumonia  The patient presents with productive cough, dyspnea on exertion and progressive generalized weakness.  On exam he is AF, RR 20, satting 97% on RA.  WBC 11.6.  COVID/RSV/flu  swab negative.  CXR with bibasilar infiltrates greater on the left.  CTA chest with no PE but did demonstrate infiltrates greater on the left as well and debris-filled left lower lobe.  The patient endorses aspirating his secretions from time to time but not solids or liquids.  *Procalcitonin: 0.31  - Continue Rocephin and azithromycin started in the ED.  Continue for 5 days  - SLP consult  - RSV/flu/COVID swab negative  - Strep pneumo urinary antigen positive  --, Legionella urinary antigen negative, sputum culture and Gram stain to be collected  - Head of bed > 30 degrees  -Respiratory viral panel in process     #Left hip pain  #Generalized weakness  #Bilateral lower extremity pain  The patient presents with progressive generalized weakness, pain in his knees, lower extremity musculature, and left hip.  Denies recent falls.  On exam he is able to flex his hips and knees .  X-ray pelvis with no acute fracture but does demonstrate mild degenerative arthritis of both hips.  Differential includes osteoarthritis (he did previously receive steroid injections in his knees) v occult fracture.  Less likely polymyalgia rheumatica or rhabdomyolysis.  Alternatively he could just have progressive generalized weakness in the setting of pneumonia and debility.  *CK total 83  *TSH: 1.28  *Sed rate: 82  - CT left hip shows no acute findings.  No fracture.  - PT/OT consulted.  Recommending TCU.  Social work consulted.  - Tylenol as needed for pain     #Elevated troponin  #Suspected pleurisy  #Noncardiac chest pain  The patient presents with several days of chest pain that is only present when he coughs or takes deep breaths. On exam he was asked to cough which did reproduce the pain.  No pain when he is not coughing.  EKG sinus rhythm with sinus arrhythmia, right bundle branch block, left anterior fascicular block.  No contiguous ST segment elevation/depressions or diffuse T wave inversions.  Troponin is mildly elevated and  downtrending and likely due to demand ischemia in the setting of pneumonia.  CTA chest negative for PE.  No friction rubs on exam to suggest pericarditis.  *Troponin 39-->37  - Tylenol as needed  --Currently chest pain-free     #Elevated D-dimer  - D-dimer significantly elevated at 3.31  - CTA chest negative for PE  -Bilateral LE ultrasound: No deep venous thrombosis in the bilateral lower extremities.   - Continue lovenox for DVT prophylaxis     #Grief  - The patient lost his wife in 10/2024.  He states he is depressed secondary to this but denies any suicidal ideation.     #Hyperglycemia  -A1c: 5.8   -BG 1/1/2025 with AM labs: 100  -Monitor glucose with labs for now     #HTN  -Continue PTA losartan 25 mg daily, metoprolol succinate 25 mg daily     #HLD  - Continue PTA Crestor 10 mg daily     #BPH  - Continue PTA Flomax     #Cognitive impairment  - Continue PTA donepezil          Clinically Significant Risk Factors Present on Admission               # Hypoalbuminemia: Lowest albumin = 2.7 g/dL at 1/1/2025  6:25 AM, will monitor as appropriate  # Coagulation Defect: INR = 1.22 (Ref range: 0.85 - 1.15) and/or PTT = N/A, will monitor for bleeding    # Hypertension: Home medication list includes antihypertensive(s)                       DVT Prophylaxis: Pneumatic Compression Devices  Code Status: No CPR- Pre-arrest intubation OK  Medically Ready for Discharge: Anticipated Tomorrow      Please see A&P for additional details of medical decision making.  35 MINUTES SPENT BY ME on the date of service doing chart review, history, exam, documentation & further activities per the note.    Tanya Murguia MD, MD  [phone](p)

## 2025-01-02 NOTE — PROGRESS NOTES
Observation goals:    -diagnostic tests and consults completed and resulted- NOT MET, needs PT, SW, OT  -vital signs normal or at patient baseline- NOT MET, HTN  -weakness improving- NOT MET, not OOB    Nurse to notify provider when observation goals have been met and patient is ready for discharge.

## 2025-01-03 LAB
BACTERIA SPT CULT: NORMAL
GRAM STAIN RESULT: NORMAL

## 2025-01-03 PROCEDURE — 120N000001 HC R&B MED SURG/OB

## 2025-01-03 PROCEDURE — 250N000013 HC RX MED GY IP 250 OP 250 PS 637: Performed by: INTERNAL MEDICINE

## 2025-01-03 PROCEDURE — 99232 SBSQ HOSP IP/OBS MODERATE 35: CPT | Performed by: HOSPITALIST

## 2025-01-03 PROCEDURE — 250N000011 HC RX IP 250 OP 636: Performed by: INTERNAL MEDICINE

## 2025-01-03 RX ADMIN — ROSUVASTATIN CALCIUM 10 MG: 10 TABLET, FILM COATED ORAL at 08:28

## 2025-01-03 RX ADMIN — METOPROLOL SUCCINATE 25 MG: 25 TABLET, EXTENDED RELEASE ORAL at 08:27

## 2025-01-03 RX ADMIN — DONEPEZIL HYDROCHLORIDE 5 MG: 5 TABLET, FILM COATED ORAL at 21:11

## 2025-01-03 RX ADMIN — TAMSULOSIN HYDROCHLORIDE 0.4 MG: 0.4 CAPSULE ORAL at 21:11

## 2025-01-03 RX ADMIN — FAMOTIDINE 20 MG: 20 TABLET ORAL at 21:11

## 2025-01-03 RX ADMIN — LOSARTAN POTASSIUM 25 MG: 25 TABLET, FILM COATED ORAL at 08:27

## 2025-01-03 RX ADMIN — FAMOTIDINE 20 MG: 20 TABLET ORAL at 08:27

## 2025-01-03 RX ADMIN — CEFTRIAXONE SODIUM 2 G: 2 INJECTION, POWDER, FOR SOLUTION INTRAMUSCULAR; INTRAVENOUS at 22:44

## 2025-01-03 RX ADMIN — ENOXAPARIN SODIUM 40 MG: 40 INJECTION SUBCUTANEOUS at 08:27

## 2025-01-03 RX ADMIN — AZITHROMYCIN DIHYDRATE 250 MG: 250 TABLET ORAL at 21:11

## 2025-01-03 ASSESSMENT — ACTIVITIES OF DAILY LIVING (ADL)
ADLS_ACUITY_SCORE: 58
ADLS_ACUITY_SCORE: 56
ADLS_ACUITY_SCORE: 58
ADLS_ACUITY_SCORE: 58
ADLS_ACUITY_SCORE: 56
ADLS_ACUITY_SCORE: 58
ADLS_ACUITY_SCORE: 56
ADLS_ACUITY_SCORE: 58

## 2025-01-03 NOTE — PLAN OF CARE
Goal Outcome Evaluation:      Plan of Care Reviewed With: patient, family    PRIMARY Concern: L hip pain and pneumonia   SAFETY RISK Concerns (fall risk, behaviors, etc.): Fall       Aggression Tool Color: Green   Isolation/Type: None   Tests/Procedures for NEXT shift: None   Consults? (Pending/following, signed-off?) SW following for TCU  Where is patient from? (Home, TCU, etc.): Home alone  Other Important info for NEXT shift:    Anticipated DC date & active delays: [facility] family providing ride  _____________________________________________________________________________  SUMMARY NOTE:  Orientation/Cognitive: A&Ox4  Observation Goals (Met/ Not Met): Inpatient  Mobility Level/Assist Equipment: Assist of 1 gb/w  Antibiotics & Plan (IV/po, length of tx left): IV Rocephin and Azithromycin   Pain Management: Denies   Tele/VS/O2: VSS on RA  ABNL Lab/BG: WBC 11.7, Hgb 12.9  Diet: Regular   Bowel/Bladder: Continent  Skin Concerns: Scattered bruising   Drains/Devices: PIV SL  Patient Stated Goal for Today: To go home

## 2025-01-03 NOTE — PLAN OF CARE
PRIMARY Concern: L hip pain and pneumonia   SAFETY RISK Concerns (fall risk, behaviors, etc.): Fall       Aggression Tool Color: Green   Isolation/Type: None   Tests/Procedures for NEXT shift: None   Consults? (Pending/following, signed-off?) SW/OT consulted.   Where is patient from? (Home, TCU, etc.): Home   Other Important info for NEXT shift: None   Anticipated DC date & active delays: TBD, PT recommending TCU  _____________________________________________________________________________  SUMMARY NOTE:  Orientation/Cognitive: AOx4, forgetful  Observation Goals (Met/ Not Met): Not met   Mobility Level/Assist Equipment: A1/GB/W  Antibiotics & Plan (IV/po, length of tx left): IV Rocephin and Azithromycin   Pain Management: Denies   Tele/VS/O2: VSS on RA except for HTN, PRN available   ABNL Lab/BG: WBC 11.7; UA positive for S pneumoniae antigen  Diet: Regular   Bowel/Bladder: Continent, voiding using urinal   Skin Concerns: WNL   Drains/Devices: PIV SL  Patient Stated Goal for Today: Rest

## 2025-01-03 NOTE — PROGRESS NOTES
.[facility]    Hospitalist Progress Note    Interval History   Patient is awake and alert.  Resting comfortably on room air.  Quite forgetful and repeatedly asks the same questions.  Denies any chest pain or hip pain.    -Data reviewed today: I reviewed all new labs and imaging results over the last 24 hours. I personally reviewed the CT hip image(s) showing no acute findings .    Physical Exam   Temp: 98  F (36.7  C) Temp src: Oral BP: 137/60 Pulse: 61   Resp: 16 SpO2: 98 % O2 Device: None (Room air)    Vitals:    12/31/24 1623 01/01/25 1144   Weight: 65.8 kg (145 lb) 68.7 kg (151 lb 7.3 oz)     Vital Signs with Ranges  Temp:  [97.1  F (36.2  C)-98.7  F (37.1  C)] 98  F (36.7  C)  Pulse:  [61-95] 61  Resp:  [16-18] 16  BP: (125-167)/(58-89) 137/60  SpO2:  [94 %-98 %] 98 %  I/O last 3 completed shifts:  In: -   Out: 100 [Urine:100]    Physical Exam  Constitutional:       Comments: Old and frail   Cardiovascular:      Rate and Rhythm: Normal rate and regular rhythm.      Pulses: Normal pulses.      Heart sounds: Normal heart sounds.   Pulmonary:      Effort: Pulmonary effort is normal. No respiratory distress.      Breath sounds: Normal breath sounds.      Comments: Bilateral basal crackles  Abdominal:      General: Abdomen is flat. Bowel sounds are normal. There is no distension.      Tenderness: There is no abdominal tenderness. There is no guarding.   Skin:     General: Skin is warm and dry.   Neurological:      General: No focal deficit present.           Medications   Current Facility-Administered Medications   Medication Dose Route Frequency Provider Last Rate Last Admin     Current Facility-Administered Medications   Medication Dose Route Frequency Provider Last Rate Last Admin    azithromycin (ZITHROMAX) tablet 250 mg  250 mg Oral At Bedtime Kelechi Ibanez MD   250 mg at 01/02/25 7726    cefTRIAXone (ROCEPHIN) 2 g vial to attach to  ml bag for ADULTS or NS 50 ml bag  for PEDS  2 g Intravenous Q24H Kelechi Ibanez MD   2 g at 01/02/25 2256    donepezil (ARICEPT) tablet 5 mg  5 mg Oral QPM Kelechi Ibanez MD   5 mg at 01/02/25 1941    enoxaparin ANTICOAGULANT (LOVENOX) injection 40 mg  40 mg Subcutaneous Q24H Kelechi Ibanez MD   40 mg at 01/03/25 0827    famotidine (PEPCID) tablet 20 mg  20 mg Oral BID Kelechi Ibanez MD   20 mg at 01/03/25 0827    losartan (COZAAR) tablet 25 mg  25 mg Oral Daily Kelechi Ibanez MD   25 mg at 01/03/25 0827    metoprolol succinate ER (TOPROL XL) 24 hr tablet 25 mg  25 mg Oral Daily Kelechi Ibanez MD   25 mg at 01/03/25 0827    rosuvastatin (CRESTOR) tablet 10 mg  10 mg Oral Daily Kelechi Ibanez MD   10 mg at 01/03/25 0828    tamsulosin (FLOMAX) capsule 0.4 mg  0.4 mg Oral QPM Kelechi Ibanez MD   0.4 mg at 01/02/25 1941       Data   Recent Labs   Lab 01/02/25  0957 01/01/25  0625 12/31/24  1704   WBC 11.7* 8.4 11.6*   HGB 12.9* 10.5* 11.9*   MCV 96 95 96    286 324   INR  --  1.22*  --     141 143   POTASSIUM 4.1 4.1 4.5   CHLORIDE 101 107 107   CO2 26 26 26   BUN 9.2 15.0 22.0   CR 0.70 0.77 0.97   ANIONGAP 12 8 10   MARITZA 9.0 8.4* 9.2   * 100* 133*   ALBUMIN  --  2.7*  --    PROTTOTAL  --  5.6*  --    BILITOTAL  --  0.4  --    ALKPHOS  --  75  --    ALT  --  35  --    AST  --  35  --        No results found for this or any previous visit (from the past 24 hours).      Assessment & Plan      Riki Alvarez is a 89 year old male admitted on 12/31/2024. He has PMH most notable for HTN, HLD, cognitive impairment, GERD, BPH that presents with progressive generalized weakness and has findings concerning for community-acquired versus aspiration pneumonia.     # Community-acquired pneumonia with streptococcal pneumonia  The patient presents with productive cough, dyspnea on exertion and progressive generalized weakness.  On exam he is AF, RR 20, satting 97% on RA.  WBC 11.6.   COVID/RSV/flu swab negative.  CXR with bibasilar infiltrates greater on the left.  CTA chest with no PE but did demonstrate infiltrates greater on the left as well and debris-filled left lower lobe.  The patient endorses aspirating his secretions from time to time but not solids or liquids.  *Procalcitonin: 0.31  - Continue Rocephin and azithromycin started in the ED.  Continue for 5 days  - SLP consult.  Recommending regular texture diet and thin liquids  - RSV/flu/COVID swab negative  - Strep pneumo urinary antigen positive  --, Legionella urinary antigen negative, sputum culture and Gram stain to be collected  - Head of bed > 30 degrees  -Respiratory viral panel negative.  Repeat CBC pending     #Left hip pain  #Generalized weakness  #Bilateral lower extremity pain  The patient presents with progressive generalized weakness, pain in his knees, lower extremity musculature, and left hip.  Denies recent falls.  On exam he is able to flex his hips and knees .  X-ray pelvis with no acute fracture but does demonstrate mild degenerative arthritis of both hips.  Differential includes osteoarthritis (he did previously receive steroid injections in his knees) v occult fracture.  Less likely polymyalgia rheumatica or rhabdomyolysis.  Alternatively he could just have progressive generalized weakness in the setting of pneumonia and debility.  *CK total 83  *TSH: 1.28  *Sed rate: 82  - CT left hip shows no acute findings.  No fracture.  - PT/OT consulted.  Recommending TCU.  Social work consulted.  - Tylenol as needed for pain     #Elevated troponin  #Suspected pleurisy  #Noncardiac chest pain  The patient presents with several days of chest pain that is only present when he coughs or takes deep breaths. On exam he was asked to cough which did reproduce the pain.  No pain when he is not coughing.  EKG sinus rhythm with sinus arrhythmia, right bundle branch block, left anterior fascicular block.  No contiguous ST segment  elevation/depressions or diffuse T wave inversions.  Troponin is mildly elevated and downtrending and likely due to demand ischemia in the setting of pneumonia.  CTA chest negative for PE.  No friction rubs on exam to suggest pericarditis.  *Troponin 39-->37  - Tylenol as needed  --Currently chest pain-free     #Elevated D-dimer  - D-dimer significantly elevated at 3.31  - CTA chest negative for PE  -Bilateral LE ultrasound: No deep venous thrombosis in the bilateral lower extremities.   - Continue lovenox for DVT prophylaxis     #Grief  - The patient lost his wife in 10/2024.  He states he is depressed secondary to this but denies any suicidal ideation.     #Hyperglycemia  -A1c: 5.8   -BG 1/1/2025 with AM labs: 100  -Monitor glucose with labs for now     #HTN  -Continue PTA losartan 25 mg daily, metoprolol succinate 25 mg daily     #HLD  - Continue PTA Crestor 10 mg daily     #BPH  - Continue PTA Flomax     #Cognitive impairment  - Continue PTA donepezil          Clinically Significant Risk Factors               # Hypoalbuminemia: Lowest albumin = 2.7 g/dL at 1/1/2025  6:25 AM, will monitor as appropriate    # Coagulation Defect: INR = 1.22 (Ref range: 0.85 - 1.15) and/or PTT = N/A, will monitor for bleeding                           DVT Prophylaxis: Pneumatic Compression Devices  Code Status: No CPR- Pre-arrest intubation OK  Medically Ready for Discharge: Ready Now      Please see A&P for additional details of medical decision making.  35 MINUTES SPENT BY ME on the date of service doing chart review, history, exam, documentation & further activities per the note.    Spoke with patient's niece over the phone.  Patient is currently medically stable for discharge.  Social work updated.  Referral sent out.  He has been accepted by TCU for Sunday/Monday depending on bed availability.    Tanya Murguia MD, MD  [phone](p)

## 2025-01-03 NOTE — PROVIDER NOTIFICATION
MD Notification    Notified Person: MD    Notified Person Name:  Phyllis    Notification Date/Time: 01/01 2 2214    Notification Interaction: Vocera    Purpose of Notification: Pt is requesting for Trazodone. Thanks      Orders Received:    Comments:

## 2025-01-03 NOTE — PROGRESS NOTES
Care Management Follow Up    Length of Stay (days): 1    Expected Discharge Date: 01/04/2025     Concerns to be Addressed:       Patient plan of care discussed at interdisciplinary rounds: Yes    Anticipated Discharge Disposition: Skilled Nursing Facility              Anticipated Discharge Services: None  Anticipated Discharge DME: None    Patient/family educated on Medicare website which has current facility and service quality ratings:    Education Provided on the Discharge Plan:    Patient/Family in Agreement with the Plan: yes    Referrals Placed by CM/SW:    Private pay costs discussed: Not applicable    Discussed  Partnership in Safe Discharge Planning  document with patient/family: No     Handoff Completed: No, handoff not indicated or clinically appropriate    Additional Information:  Writer received message patient was accepted to [facility] for discharge. Opening on Sunday/Monday. Writer called and updated santana Aguiar the news. Stefania stated family will be able to transport around 10 am Sunday morning. Writer sent a message to [facility] asking if the time is acceptable. Waiting a response back.     Next Steps: adarsh ARAUJO  [facility]  Inpatient Care Coordination

## 2025-01-04 LAB
ERYTHROCYTE [DISTWIDTH] IN BLOOD BY AUTOMATED COUNT: 12.6 % (ref 10–15)
HCT VFR BLD AUTO: 34.9 % (ref 40–53)
HGB BLD-MCNC: 11.6 G/DL (ref 13.3–17.7)
HOLD SPECIMEN: NORMAL
MCH RBC QN AUTO: 32.1 PG (ref 26.5–33)
MCHC RBC AUTO-ENTMCNC: 33.2 G/DL (ref 31.5–36.5)
MCV RBC AUTO: 97 FL (ref 78–100)
PLATELET # BLD AUTO: 390 10E3/UL (ref 150–450)
PLATELET # BLD AUTO: 402 10E3/UL (ref 150–450)
RBC # BLD AUTO: 3.61 10E6/UL (ref 4.4–5.9)
WBC # BLD AUTO: 7.5 10E3/UL (ref 4–11)

## 2025-01-04 PROCEDURE — 36415 COLL VENOUS BLD VENIPUNCTURE: CPT | Performed by: INTERNAL MEDICINE

## 2025-01-04 PROCEDURE — 36415 COLL VENOUS BLD VENIPUNCTURE: CPT | Performed by: HOSPITALIST

## 2025-01-04 PROCEDURE — 99232 SBSQ HOSP IP/OBS MODERATE 35: CPT | Performed by: HOSPITALIST

## 2025-01-04 PROCEDURE — 85049 AUTOMATED PLATELET COUNT: CPT | Performed by: HOSPITALIST

## 2025-01-04 PROCEDURE — 250N000013 HC RX MED GY IP 250 OP 250 PS 637: Performed by: HOSPITALIST

## 2025-01-04 PROCEDURE — 250N000013 HC RX MED GY IP 250 OP 250 PS 637: Performed by: INTERNAL MEDICINE

## 2025-01-04 PROCEDURE — 85027 COMPLETE CBC AUTOMATED: CPT | Performed by: INTERNAL MEDICINE

## 2025-01-04 PROCEDURE — 250N000011 HC RX IP 250 OP 636: Performed by: INTERNAL MEDICINE

## 2025-01-04 PROCEDURE — 120N000001 HC R&B MED SURG/OB

## 2025-01-04 RX ORDER — VALACYCLOVIR HYDROCHLORIDE 1 G/1
1000 TABLET, FILM COATED ORAL 3 TIMES DAILY
Status: DISCONTINUED | OUTPATIENT
Start: 2025-01-04 | End: 2025-01-06 | Stop reason: HOSPADM

## 2025-01-04 RX ADMIN — ENOXAPARIN SODIUM 40 MG: 40 INJECTION SUBCUTANEOUS at 08:15

## 2025-01-04 RX ADMIN — CEFTRIAXONE SODIUM 2 G: 2 INJECTION, POWDER, FOR SOLUTION INTRAMUSCULAR; INTRAVENOUS at 23:50

## 2025-01-04 RX ADMIN — METOPROLOL SUCCINATE 25 MG: 25 TABLET, EXTENDED RELEASE ORAL at 08:15

## 2025-01-04 RX ADMIN — VALACYCLOVIR HYDROCHLORIDE 1000 MG: 1 TABLET, FILM COATED ORAL at 14:22

## 2025-01-04 RX ADMIN — ROSUVASTATIN CALCIUM 10 MG: 10 TABLET, FILM COATED ORAL at 08:14

## 2025-01-04 RX ADMIN — FAMOTIDINE 20 MG: 20 TABLET ORAL at 08:14

## 2025-01-04 RX ADMIN — TAMSULOSIN HYDROCHLORIDE 0.4 MG: 0.4 CAPSULE ORAL at 21:02

## 2025-01-04 RX ADMIN — FAMOTIDINE 20 MG: 20 TABLET ORAL at 21:02

## 2025-01-04 RX ADMIN — DONEPEZIL HYDROCHLORIDE 5 MG: 5 TABLET, FILM COATED ORAL at 21:02

## 2025-01-04 RX ADMIN — VALACYCLOVIR HYDROCHLORIDE 1000 MG: 1 TABLET, FILM COATED ORAL at 21:02

## 2025-01-04 RX ADMIN — LOSARTAN POTASSIUM 25 MG: 25 TABLET, FILM COATED ORAL at 08:15

## 2025-01-04 RX ADMIN — AZITHROMYCIN DIHYDRATE 250 MG: 250 TABLET ORAL at 21:02

## 2025-01-04 ASSESSMENT — ACTIVITIES OF DAILY LIVING (ADL)
ADLS_ACUITY_SCORE: 58
ADLS_ACUITY_SCORE: 59
ADLS_ACUITY_SCORE: 58
ADLS_ACUITY_SCORE: 59
ADLS_ACUITY_SCORE: 58
ADLS_ACUITY_SCORE: 59
ADLS_ACUITY_SCORE: 58

## 2025-01-04 NOTE — PLAN OF CARE
Goal Outcome Evaluation:  PRIMARY Concern: L hip pain and pneumonia   SAFETY RISK Concerns (fall risk, behaviors, etc.): Fall       Aggression Tool Color: Green   Isolation/Type: None   Tests/Procedures for NEXT shift: labs  Consults? (Pending/following, signed-off?) SW following for TCU  Where is patient from? (Home, TCU, etc.): Home alone  Other Important info for NEXT shift:    Anticipated DC date & active delays: [facility] family providing [facility]  _____________________________________________________________________________  SUMMARY NOTE:  Orientation/Cognitive: A&Ox4, forgetful.  Observation Goals (Met/ Not Met): Inpatient  Mobility Level/Assist Equipment: Assist of 1 gb/w  Antibiotics & Plan (IV/po, length of tx left): IV Rocephin and Azithromycin   Pain Management: Denies   Tele/VS/O2: VSS on RA  ABNL Lab/BG: WBC 11.7, Hgb 12.9  Diet: Regular   Bowel/Bladder: Continent  Skin Concerns: Scattered bruising   Drains/Devices: PIV SL  Patient Stated Goal for Today: To go home

## 2025-01-04 NOTE — PROGRESS NOTES
.[facility]    Hospitalist Progress Note    Interval History   Patient is awake and alert.  More oriented and less forgetful today.  Requesting to use his eyedrops.  Otherwise denies any significant hip pain or shortness of breath.  Afebrile overnight.  On room air.    -Data reviewed today: I reviewed all new labs and imaging results over the last 24 hours. I personally reviewed the CT hip image(s) showing no acute findings .    Physical Exam   Temp: 98.2  F (36.8  C) Temp src: Oral BP: (!) 148/68 Pulse: 67   Resp: 18 SpO2: 94 % O2 Device: None (Room air)    Vitals:    12/31/24 1623 01/01/25 1144   Weight: 65.8 kg (145 lb) 68.7 kg (151 lb 7.3 oz)     Vital Signs with Ranges  Temp:  [98.2  F (36.8  C)-98.6  F (37  C)] 98.2  F (36.8  C)  Pulse:  [67-71] 67  Resp:  [16-18] 18  BP: (122-161)/(55-68) 148/68  SpO2:  [94 %-97 %] 94 %  No intake/output data recorded.    Physical Exam  Constitutional:       Comments: Old and frail   Cardiovascular:      Rate and Rhythm: Normal rate and regular rhythm.      Pulses: Normal pulses.      Heart sounds: Normal heart sounds.   Pulmonary:      Effort: Pulmonary effort is normal. No respiratory distress.      Breath sounds: Normal breath sounds.      Comments: Bilateral basal crackles  Abdominal:      General: Abdomen is flat. Bowel sounds are normal. There is no distension.      Tenderness: There is no abdominal tenderness. There is no guarding.   Skin:     General: Skin is warm and dry.   Neurological:      General: No focal deficit present.           Medications   Current Facility-Administered Medications   Medication Dose Route Frequency Provider Last Rate Last Admin     Current Facility-Administered Medications   Medication Dose Route Frequency Provider Last Rate Last Admin    azithromycin (ZITHROMAX) tablet 250 mg  250 mg Oral At Bedtime Kelechi Ibanez MD   250 mg at 01/03/25 2111    cefTRIAXone (ROCEPHIN) 2 g vial to attach to  ml bag  for ADULTS or NS 50 ml bag for PEDS  2 g Intravenous Q24H Kelechi Ibanez  mL/hr at 01/03/25 2244 2 g at 01/03/25 2244    donepezil (ARICEPT) tablet 5 mg  5 mg Oral QPM Kelechi Ibanez MD   5 mg at 01/03/25 2111    enoxaparin ANTICOAGULANT (LOVENOX) injection 40 mg  40 mg Subcutaneous Q24H Kelechi Ibanez MD   40 mg at 01/04/25 0815    famotidine (PEPCID) tablet 20 mg  20 mg Oral BID Kelechi Ibanez MD   20 mg at 01/04/25 0814    fluorometholone (FLAREX) 0.1 % ophthalmic susp 1 drop  1 drop Right Eye QAM Tanya Murguia MD   1 drop at 01/04/25 1158    losartan (COZAAR) tablet 25 mg  25 mg Oral Daily Kelechi Ibanez MD   25 mg at 01/04/25 0815    metoprolol succinate ER (TOPROL XL) 24 hr tablet 25 mg  25 mg Oral Daily Kelechi Ibanez MD   25 mg at 01/04/25 0815    rosuvastatin (CRESTOR) tablet 10 mg  10 mg Oral Daily Kelechi Ibanez MD   10 mg at 01/04/25 0814    tamsulosin (FLOMAX) capsule 0.4 mg  0.4 mg Oral QPM Kelechi Ibanez MD   0.4 mg at 01/03/25 2111    valACYclovir (VALTREX) tablet 1,000 mg  1,000 mg Oral TID Tanya Murguia MD           Data   Recent Labs   Lab 01/04/25  0901 01/04/25  0150 01/02/25  0957 01/01/25  0625 12/31/24  1704   WBC 7.5  --  11.7* 8.4 11.6*   HGB 11.6*  --  12.9* 10.5* 11.9*   MCV 97  --  96 95 96    390 384 286 324   INR  --   --   --  1.22*  --    NA  --   --  139 141 143   POTASSIUM  --   --  4.1 4.1 4.5   CHLORIDE  --   --  101 107 107   CO2  --   --  26 26 26   BUN  --   --  9.2 15.0 22.0   CR  --   --  0.70 0.77 0.97   ANIONGAP  --   --  12 8 10   MARITZA  --   --  9.0 8.4* 9.2   GLC  --   --  107* 100* 133*   ALBUMIN  --   --   --  2.7*  --    PROTTOTAL  --   --   --  5.6*  --    BILITOTAL  --   --   --  0.4  --    ALKPHOS  --   --   --  75  --    ALT  --   --   --  35  --    AST  --   --   --  35  --        No results found for this or any previous visit (from the past 24 hours).      Assessment & Plan       Riki Alvarez is a 89 year old male admitted on 12/31/2024. He has PMH most notable for HTN, HLD, cognitive impairment, GERD, BPH that presents with progressive generalized weakness and has findings concerning for community-acquired versus aspiration pneumonia.     # Community-acquired pneumonia with streptococcal pneumonia  The patient presents with productive cough, dyspnea on exertion and progressive generalized weakness.  On exam he is AF, RR 20, satting 97% on RA.  WBC 11.6.  COVID/RSV/flu swab negative.  CXR with bibasilar infiltrates greater on the left.  CTA chest with no PE but did demonstrate infiltrates greater on the left as well and debris-filled left lower lobe.  The patient endorses aspirating his secretions from time to time but not solids or liquids.  *Procalcitonin: 0.31  - Continue Rocephin and azithromycin started in the ED.  Continue for 5 days  - SLP consult.  Recommending regular texture diet and thin liquids  - RSV/flu/COVID swab negative  - Strep pneumo urinary antigen positive  --, Legionella urinary antigen negative, sputum culture and Gram stain to be collected  - Head of bed > 30 degrees  -Respiratory viral panel negative.  Repeat CBC shows resolved leukocytosis.     #Left hip pain  #Generalized weakness  #Bilateral lower extremity pain  The patient presents with progressive generalized weakness, pain in his knees, lower extremity musculature, and left hip.  Denies recent falls.  On exam he is able to flex his hips and knees .  X-ray pelvis with no acute fracture but does demonstrate mild degenerative arthritis of both hips.  Differential includes osteoarthritis (he did previously receive steroid injections in his knees) v occult fracture.  Less likely polymyalgia rheumatica or rhabdomyolysis.  Alternatively he could just have progressive generalized weakness in the setting of pneumonia and debility.  *CK total 83  *TSH: 1.28  *Sed rate: 82  - CT left hip shows no acute  findings.  No fracture.  - PT/OT consulted.  Recommending TCU.  Social work consulted.  - Tylenol as needed for pain     #Elevated troponin  #Suspected pleurisy  #Noncardiac chest pain  The patient presents with several days of chest pain that is only present when he coughs or takes deep breaths. On exam he was asked to cough which did reproduce the pain.  No pain when he is not coughing.  EKG sinus rhythm with sinus arrhythmia, right bundle branch block, left anterior fascicular block.  No contiguous ST segment elevation/depressions or diffuse T wave inversions.  Troponin is mildly elevated and downtrending and likely due to demand ischemia in the setting of pneumonia.  CTA chest negative for PE.  No friction rubs on exam to suggest pericarditis.  *Troponin 39-->37  - Tylenol as needed  --Currently chest pain-free     #Elevated D-dimer  - D-dimer significantly elevated at 3.31  - CTA chest negative for PE  -Bilateral LE ultrasound: No deep venous thrombosis in the bilateral lower extremities.   - Continue lovenox for DVT prophylaxis     #Grief  - The patient lost his wife in 10/2024.  He states he is depressed secondary to this but denies any suicidal ideation.     #Hyperglycemia  -A1c: 5.8   -BG 1/1/2025 with AM labs: 100  -Monitor glucose with labs for now     #HTN  -Continue PTA losartan 25 mg daily, metoprolol succinate 25 mg daily     #HLD  - Continue PTA Crestor 10 mg daily     #BPH  - Continue PTA Flomax     #Cognitive impairment  - Continue PTA donepezil    Chronic anemia  -Hemoglobin stable between 10 and 11          Clinically Significant Risk Factors               # Hypoalbuminemia: Lowest albumin = 2.7 g/dL at 1/1/2025  6:25 AM, will monitor as appropriate                              DVT Prophylaxis: Pneumatic Compression Devices  Code Status: No CPR- Pre-arrest intubation OK  Medically Ready for Discharge: Ready Now      Please see A&P for additional details of medical decision making.  35 MINUTES  SPENT BY ME on the date of service doing chart review, history, exam, documentation & further activities per the note.        Tanya Murguia MD, MD  [phone](p)

## 2025-01-05 PROCEDURE — 250N000013 HC RX MED GY IP 250 OP 250 PS 637: Performed by: HOSPITALIST

## 2025-01-05 PROCEDURE — 99232 SBSQ HOSP IP/OBS MODERATE 35: CPT | Performed by: HOSPITALIST

## 2025-01-05 PROCEDURE — 250N000013 HC RX MED GY IP 250 OP 250 PS 637: Performed by: INTERNAL MEDICINE

## 2025-01-05 PROCEDURE — 120N000001 HC R&B MED SURG/OB

## 2025-01-05 PROCEDURE — 250N000011 HC RX IP 250 OP 636: Performed by: INTERNAL MEDICINE

## 2025-01-05 RX ORDER — ACETAMINOPHEN 325 MG/1
650 TABLET ORAL EVERY 4 HOURS PRN
DISCHARGE
Start: 2025-01-05

## 2025-01-05 RX ADMIN — FAMOTIDINE 20 MG: 20 TABLET ORAL at 08:04

## 2025-01-05 RX ADMIN — DONEPEZIL HYDROCHLORIDE 5 MG: 5 TABLET, FILM COATED ORAL at 20:25

## 2025-01-05 RX ADMIN — TAMSULOSIN HYDROCHLORIDE 0.4 MG: 0.4 CAPSULE ORAL at 20:25

## 2025-01-05 RX ADMIN — ENOXAPARIN SODIUM 40 MG: 40 INJECTION SUBCUTANEOUS at 08:04

## 2025-01-05 RX ADMIN — LOSARTAN POTASSIUM 25 MG: 25 TABLET, FILM COATED ORAL at 08:05

## 2025-01-05 RX ADMIN — VALACYCLOVIR HYDROCHLORIDE 1000 MG: 1 TABLET, FILM COATED ORAL at 08:04

## 2025-01-05 RX ADMIN — VALACYCLOVIR HYDROCHLORIDE 1000 MG: 1 TABLET, FILM COATED ORAL at 20:25

## 2025-01-05 RX ADMIN — ROSUVASTATIN CALCIUM 10 MG: 10 TABLET, FILM COATED ORAL at 08:04

## 2025-01-05 RX ADMIN — FAMOTIDINE 20 MG: 20 TABLET ORAL at 20:25

## 2025-01-05 RX ADMIN — METOPROLOL SUCCINATE 25 MG: 25 TABLET, EXTENDED RELEASE ORAL at 08:04

## 2025-01-05 RX ADMIN — VALACYCLOVIR HYDROCHLORIDE 1000 MG: 1 TABLET, FILM COATED ORAL at 14:28

## 2025-01-05 ASSESSMENT — ACTIVITIES OF DAILY LIVING (ADL)
ADLS_ACUITY_SCORE: 56
ADLS_ACUITY_SCORE: 59
ADLS_ACUITY_SCORE: 60
ADLS_ACUITY_SCORE: 59
ADLS_ACUITY_SCORE: 56
ADLS_ACUITY_SCORE: 60
ADLS_ACUITY_SCORE: 60
ADLS_ACUITY_SCORE: 56
ADLS_ACUITY_SCORE: 60
ADLS_ACUITY_SCORE: 56
ADLS_ACUITY_SCORE: 56
ADLS_ACUITY_SCORE: 59
ADLS_ACUITY_SCORE: 60
ADLS_ACUITY_SCORE: 60
ADLS_ACUITY_SCORE: 59
ADLS_ACUITY_SCORE: 60
ADLS_ACUITY_SCORE: 60
ADLS_ACUITY_SCORE: 56
ADLS_ACUITY_SCORE: 60
ADLS_ACUITY_SCORE: 59
ADLS_ACUITY_SCORE: 59
ADLS_ACUITY_SCORE: 56
ADLS_ACUITY_SCORE: 60

## 2025-01-05 NOTE — PLAN OF CARE
Goal Outcome Evaluation:      Plan of Care Reviewed With: patient, other (see comments)    Overall Patient Progress: improvingOverall Patient Progress: improving    Outcome Evaluation: awaiting c/b from [facility] for time    PRIMARY Concern: L hip pain and pneumonia   SAFETY RISK Concerns (fall risk, behaviors, etc.): Fall       Aggression Tool Color: Green   Isolation/Type: None   Tests/Procedures for NEXT shift: None   Consults? (Pending/following, signed-off?) SW following for TCU  Where is patient from? (Home, TCU, etc.): Home alone  Other Important info for NEXT shift:  Please call John, when [facility] c/b w/time  Anticipated DC date & active delays: Plan was for discharge today to [facility], Stefania providing ride  _____________________________________________________________________________  SUMMARY NOTE:  Orientation/Cognitive: A&Ox4  Observation Goals (Met/ Not Met): Inpatient  Mobility Level/Assist Equipment: A1 gb/w  Antibiotics & Plan (IV/po, length of tx left): completed  Pain Management: Denies   Tele/VS/O2: VSS on RA  ABNL Lab/BG: na  Diet: Regular   Bowel/Bladder: Continent  Skin Concerns: Scattered bruising   Drains/Devices: PIV SL  Patient Stated Goal for Today: To go home

## 2025-01-05 NOTE — PROGRESS NOTES
.[facility]    Hospitalist Progress Note    Interval History   Patient is awake and alert.  No new changes today.  Awaiting placement.    -Data reviewed today: I reviewed all new labs and imaging results over the last 24 hours. I personally reviewed the CT hip image(s) showing no acute findings .    Physical Exam   Temp: 98.5  F (36.9  C) Temp src: Oral BP: (!) 149/60 Pulse: 68   Resp: 18 SpO2: 94 % O2 Device: None (Room air)    Vitals:    12/31/24 1623 01/01/25 1144   Weight: 65.8 kg (145 lb) 68.7 kg (151 lb 7.3 oz)     Vital Signs with Ranges  Temp:  [97.6  F (36.4  C)-98.5  F (36.9  C)] 98.5  F (36.9  C)  Pulse:  [68-72] 68  Resp:  [18] 18  BP: (138-153)/(60-70) 149/60  SpO2:  [93 %-94 %] 94 %  No intake/output data recorded.    Physical Exam  Constitutional:       Comments: Old and frail   Cardiovascular:      Rate and Rhythm: Normal rate and regular rhythm.      Pulses: Normal pulses.      Heart sounds: Normal heart sounds.   Pulmonary:      Effort: Pulmonary effort is normal. No respiratory distress.      Breath sounds: Normal breath sounds.      Comments: Bilateral basal crackles  Abdominal:      General: Abdomen is flat. Bowel sounds are normal. There is no distension.      Tenderness: There is no abdominal tenderness. There is no guarding.   Skin:     General: Skin is warm and dry.   Neurological:      General: No focal deficit present.           Medications   Current Facility-Administered Medications   Medication Dose Route Frequency Provider Last Rate Last Admin     Current Facility-Administered Medications   Medication Dose Route Frequency Provider Last Rate Last Admin    donepezil (ARICEPT) tablet 5 mg  5 mg Oral QPM Kelechi Ibanez MD   5 mg at 01/04/25 2102    enoxaparin ANTICOAGULANT (LOVENOX) injection 40 mg  40 mg Subcutaneous Q24H Kelechi Ibanez MD   40 mg at 01/05/25 0804    famotidine (PEPCID) tablet 20 mg  20 mg Oral BID Kelechi Ibanez MD   20 mg  at 01/05/25 0804    fluorometholone (FLAREX) 0.1 % ophthalmic susp 1 drop  1 drop Right Eye QAM Tanya Murguia MD   1 drop at 01/05/25 0936    losartan (COZAAR) tablet 25 mg  25 mg Oral Daily Kelechi Ibanez MD   25 mg at 01/05/25 0805    metoprolol succinate ER (TOPROL XL) 24 hr tablet 25 mg  25 mg Oral Daily Kelechi Ibanez MD   25 mg at 01/05/25 0804    rosuvastatin (CRESTOR) tablet 10 mg  10 mg Oral Daily Kelechi Ibanez MD   10 mg at 01/05/25 0804    tamsulosin (FLOMAX) capsule 0.4 mg  0.4 mg Oral QPM Kelechi Ibanez MD   0.4 mg at 01/04/25 2102    valACYclovir (VALTREX) tablet 1,000 mg  1,000 mg Oral TID Tanya Murguia MD   1,000 mg at 01/05/25 0804       Data   Recent Labs   Lab 01/04/25  0901 01/04/25  0150 01/02/25  0957 01/01/25  0625 12/31/24  1704   WBC 7.5  --  11.7* 8.4 11.6*   HGB 11.6*  --  12.9* 10.5* 11.9*   MCV 97  --  96 95 96    390 384 286 324   INR  --   --   --  1.22*  --    NA  --   --  139 141 143   POTASSIUM  --   --  4.1 4.1 4.5   CHLORIDE  --   --  101 107 107   CO2  --   --  26 26 26   BUN  --   --  9.2 15.0 22.0   CR  --   --  0.70 0.77 0.97   ANIONGAP  --   --  12 8 10   MARITZA  --   --  9.0 8.4* 9.2   GLC  --   --  107* 100* 133*   ALBUMIN  --   --   --  2.7*  --    PROTTOTAL  --   --   --  5.6*  --    BILITOTAL  --   --   --  0.4  --    ALKPHOS  --   --   --  75  --    ALT  --   --   --  35  --    AST  --   --   --  35  --        No results found for this or any previous visit (from the past 24 hours).      Assessment & Plan      Riki Alvarez is a 89 year old male admitted on 12/31/2024. He has PMH most notable for HTN, HLD, cognitive impairment, GERD, BPH that presents with progressive generalized weakness and has findings concerning for community-acquired versus aspiration pneumonia.     # Community-acquired pneumonia with streptococcal pneumonia  The patient presents with productive cough, dyspnea on exertion and progressive generalized  weakness.  On exam he is AF, RR 20, satting 97% on RA.  WBC 11.6.  COVID/RSV/flu swab negative.  CXR with bibasilar infiltrates greater on the left.  CTA chest with no PE but did demonstrate infiltrates greater on the left as well and debris-filled left lower lobe.  The patient endorses aspirating his secretions from time to time but not solids or liquids.  *Procalcitonin: 0.31  - On ceftriaxone and azithromycin.  Completed 5-day course.  - SLP consult.  Recommending regular texture diet and thin liquids  - RSV/flu/COVID swab negative  - Strep pneumo urinary antigen positive  --, Legionella urinary antigen negative, sputum culture and Gram stain to be collected  - Head of bed > 30 degrees  -Respiratory viral panel negative.  Repeat CBC shows resolved leukocytosis.     #Left hip pain  #Generalized weakness  #Bilateral lower extremity pain  The patient presents with progressive generalized weakness, pain in his knees, lower extremity musculature, and left hip.  Denies recent falls.  On exam he is able to flex his hips and knees .  X-ray pelvis with no acute fracture but does demonstrate mild degenerative arthritis of both hips.  Differential includes osteoarthritis (he did previously receive steroid injections in his knees) v occult fracture.  Less likely polymyalgia rheumatica or rhabdomyolysis.  Alternatively he could just have progressive generalized weakness in the setting of pneumonia and debility.  *CK total 83  *TSH: 1.28  *Sed rate: 82  - CT left hip shows no acute findings.  No fracture.  - PT/OT consulted.  Recommending TCU.  Social work consulted.  - Tylenol as needed for pain     #Elevated troponin  #Suspected pleurisy  #Noncardiac chest pain  The patient presents with several days of chest pain that is only present when he coughs or takes deep breaths. On exam he was asked to cough which did reproduce the pain.  No pain when he is not coughing.  EKG sinus rhythm with sinus arrhythmia, right bundle branch  block, left anterior fascicular block.  No contiguous ST segment elevation/depressions or diffuse T wave inversions.  Troponin is mildly elevated and downtrending and likely due to demand ischemia in the setting of pneumonia.  CTA chest negative for PE.  No friction rubs on exam to suggest pericarditis.  *Troponin 39-->37  - Tylenol as needed  --Currently chest pain-free     #Elevated D-dimer  - D-dimer significantly elevated at 3.31  - CTA chest negative for PE  -Bilateral LE ultrasound: No deep venous thrombosis in the bilateral lower extremities.   - Continue lovenox for DVT prophylaxis     #Grief  - The patient lost his wife in 10/2024.  He states he is depressed secondary to this but denies any suicidal ideation.     #Hyperglycemia  -A1c: 5.8   -BG 1/1/2025 with AM labs: 100  -Monitor glucose with labs for now     #HTN  -Continue PTA losartan 25 mg daily, metoprolol succinate 25 mg daily     #HLD  - Continue PTA Crestor 10 mg daily     #BPH  - Continue PTA Flomax     #Cognitive impairment  - Continue PTA donepezil    Chronic anemia  -Hemoglobin stable between 10 and 11          Clinically Significant Risk Factors               # Hypoalbuminemia: Lowest albumin = 2.7 g/dL at 1/1/2025  6:25 AM, will monitor as appropriate                              DVT Prophylaxis: Pneumatic Compression Devices  Code Status: No CPR- Pre-arrest intubation OK  Medically Ready for Discharge: Ready Now      Please see A&P for additional details of medical decision making.  25 MINUTES SPENT BY ME on the date of service doing chart review, history, exam, documentation & further activities per the note.  Patient is currently medically stable for discharge.  Has been accepted to a TCU tentatively today or tomorrow.  Social work has been trying to contact the TCU with no callback since yesterday.        Tanya Murguia MD, MD  [phone](p)

## 2025-01-05 NOTE — PLAN OF CARE
Goal Outcome Evaluation:      Plan of Care Reviewed With: patient    Overall Patient Progress: improvingOverall Patient Progress: improving     PRIMARY Concern: L hip pain and pneumonia   SAFETY RISK Concerns (fall risk, behaviors, etc.): Fall Risk     Aggression Tool Color: Green   Isolation/Type: None   Tests/Procedures for NEXT shift: labs  Consults? (Pending/following, signed-off?) SW following for TCU  Where is patient from? (Home, TCU, etc.): Home alone  Other Important info for NEXT shift:  Pt asking for answer regarding date of discharge to [facility]  Anticipated DC date & active delays: Plan for discharge on Sunday (1/5/25) to [facility], family providing ride - awaiting response from [facility]   _____________________________________________________________________________  SUMMARY NOTE:  Orientation/Cognitive: A&Ox4, forgetful.  Observation Goals (Met/ Not Met): Inpatient  Mobility Level/Assist Equipment: Assist of 1 gb/w  Antibiotics & Plan (IV/po, length of tx left): IV Rocephin and oral azithromycin   Pain Management: Denies   Tele/VS/O2: VSS on RA  ABNL Lab/BG:   Diet: Regular   Bowel/Bladder: incontinent of bladder at times; up to bathroom   Skin Concerns: Scattered bruising   Drains/Devices: PIV SL  Patient Stated Goal for Today: To go to [facility]

## 2025-01-05 NOTE — PLAN OF CARE
PRIMARY Concern: L hip pain and pneumonia   SAFETY RISK Concerns (fall risk, behaviors, etc.): Fall       Aggression Tool Color: Green   Isolation/Type: None   Tests/Procedures for NEXT shift: labs  Consults? (Pending/following, signed-off?) SW following for TCU  Where is patient from? (Home, TCU, etc.): Home alone  Other Important info for NEXT shift:    Anticipated DC date & active delays: [facility]   _____________________________________________________________________________  SUMMARY NOTE:  Orientation/Cognitive: A&Ox4, forgetful.  Observation Goals (Met/ Not Met): Inpatient  Mobility Level/Assist Equipment: Assist of 1 gb/w  Antibiotics & Plan (IV/po, length of tx left): IV Rocephin and oral azithromycin   Pain Management: Denies   Tele/VS/O2: VSS on RA  ABNL Lab/BG: WBC 11.7, Hgb 12.9  Diet: Regular   Bowel/Bladder: incontinent of bladder at times; up to bathroom   Skin Concerns: Scattered bruising   Drains/Devices: PIV SL  Patient Stated Goal for Today: To go home

## 2025-01-05 NOTE — PROGRESS NOTES
Care Management Follow Up    Length of Stay (days): 3    Expected Discharge Date: 01/05/2025     Concerns to be Addressed:       Patient plan of care discussed at interdisciplinary rounds: Yes    Anticipated Discharge Disposition: Skilled Nursing Facility              Anticipated Discharge Services: None  Anticipated Discharge DME: None    Patient/family educated on Medicare website which has current facility and service quality ratings:    Education Provided on the Discharge Plan:    Patient/Family in Agreement with the Plan: yes    Referrals Placed by CM/SW:    Private pay costs discussed: Not applicable    Discussed  Partnership in Safe Discharge Planning  document with patient/family: No     Handoff Completed: No, handoff not indicated or clinically appropriate    Additional Information:  Writer attempted to get in touch again with [facility] admissions to inquire when patient is able to admit. Per previous  note on 1/3, patient was medically accepted at [facility] but had not received follow up on whether they can accept patient on Sunday or Monday.    Addendum 1549: Writer placed call again to [facility] TCU with no answer. Writer left  to coordinate admission as patient was clinically accepted at facility.     Next Steps: Confirm when [facility] can admit patient. PAS.     Will continue to follow.    TRICE Tomas, LGSW    [employer]

## 2025-01-06 VITALS
HEART RATE: 70 BPM | DIASTOLIC BLOOD PRESSURE: 66 MMHG | RESPIRATION RATE: 16 BRPM | SYSTOLIC BLOOD PRESSURE: 156 MMHG | OXYGEN SATURATION: 95 % | TEMPERATURE: 97.4 F | BODY MASS INDEX: 21.68 KG/M2 | HEIGHT: 70 IN | WEIGHT: 151.46 LBS

## 2025-01-06 PROCEDURE — 250N000013 HC RX MED GY IP 250 OP 250 PS 637: Performed by: HOSPITALIST

## 2025-01-06 PROCEDURE — 97535 SELF CARE MNGMENT TRAINING: CPT | Mod: GO | Performed by: OCCUPATIONAL THERAPIST

## 2025-01-06 PROCEDURE — 250N000011 HC RX IP 250 OP 636: Performed by: INTERNAL MEDICINE

## 2025-01-06 PROCEDURE — 250N000013 HC RX MED GY IP 250 OP 250 PS 637: Performed by: INTERNAL MEDICINE

## 2025-01-06 PROCEDURE — 99239 HOSP IP/OBS DSCHRG MGMT >30: CPT | Performed by: HOSPITALIST

## 2025-01-06 RX ORDER — DONEPEZIL HYDROCHLORIDE 5 MG/1
5 TABLET, FILM COATED ORAL EVERY EVENING
DISCHARGE
Start: 2025-01-06

## 2025-01-06 RX ORDER — LOSARTAN POTASSIUM 25 MG/1
25 TABLET ORAL DAILY
DISCHARGE
Start: 2025-01-06

## 2025-01-06 RX ORDER — ROSUVASTATIN CALCIUM 10 MG/1
10 TABLET, COATED ORAL DAILY
DISCHARGE
Start: 2025-01-06

## 2025-01-06 RX ORDER — TAMSULOSIN HYDROCHLORIDE 0.4 MG/1
0.4 CAPSULE ORAL EVERY EVENING
DISCHARGE
Start: 2025-01-06

## 2025-01-06 RX ORDER — FAMCICLOVIR 500 MG/1
500 TABLET ORAL 2 TIMES DAILY
DISCHARGE
Start: 2025-01-06

## 2025-01-06 RX ORDER — FAMOTIDINE 20 MG/1
20 TABLET, FILM COATED ORAL 2 TIMES DAILY
DISCHARGE
Start: 2025-01-06

## 2025-01-06 RX ORDER — METOPROLOL SUCCINATE 25 MG/1
25 TABLET, EXTENDED RELEASE ORAL DAILY
DISCHARGE
Start: 2025-01-06

## 2025-01-06 RX ADMIN — METOPROLOL SUCCINATE 25 MG: 25 TABLET, EXTENDED RELEASE ORAL at 08:23

## 2025-01-06 RX ADMIN — LOSARTAN POTASSIUM 25 MG: 25 TABLET, FILM COATED ORAL at 08:23

## 2025-01-06 RX ADMIN — FAMOTIDINE 20 MG: 20 TABLET ORAL at 08:23

## 2025-01-06 RX ADMIN — VALACYCLOVIR HYDROCHLORIDE 1000 MG: 1 TABLET, FILM COATED ORAL at 08:23

## 2025-01-06 RX ADMIN — ENOXAPARIN SODIUM 40 MG: 40 INJECTION SUBCUTANEOUS at 08:23

## 2025-01-06 RX ADMIN — ROSUVASTATIN CALCIUM 10 MG: 10 TABLET, FILM COATED ORAL at 08:23

## 2025-01-06 ASSESSMENT — ACTIVITIES OF DAILY LIVING (ADL)
ADLS_ACUITY_SCORE: 59
ADLS_ACUITY_SCORE: 60
ADLS_ACUITY_SCORE: 60
ADLS_ACUITY_SCORE: 59
ADLS_ACUITY_SCORE: 60
ADLS_ACUITY_SCORE: 60
ADLS_ACUITY_SCORE: 59
ADLS_ACUITY_SCORE: 59
ADLS_ACUITY_SCORE: 60
ADLS_ACUITY_SCORE: 60

## 2025-01-06 NOTE — PROGRESS NOTES
Care Management Discharge Note    Discharge Date: 01/06/2025       Discharge Disposition: Skilled Nursing Facility    Discharge Services: None    Discharge DME: None    Discharge Transportation: Santana Aguiar at 1300,  at door 6    Private pay costs discussed: Not applicable    Does the patient's insurance plan have a 3 day qualifying hospital stay waiver?  No    PAS Confirmation Code: [id number]  Patient/family educated on Medicare website which has current facility and service quality ratings:      Education Provided on the Discharge Plan:    Persons Notified of Discharge Plans: Hospitalist, HUC, Charge RN, bedside RN, family  Patient/Family in Agreement with the Plan: yes    Handoff Referral Completed: No, handoff not indicated or clinically appropriate    Additional Information:  Spoke with Belén from [facility] TCU, they can accept pt today at any time. Updated hospitalist, requested discharge orders have diagnosis next to each medication, per [facility]'s request. Faxed discharge orders to TCU. Completed PAS. Spoke with santana Aguiar, who states she can still transport today and will pick pt up at door 6 at 1300. Stefania wants to make sure pt brings all their stuff with them. Updated Belén at [facility] on ride time.    PAS-RR    D: Per DHS regulation, SW completed and submitted PAS-RR to [place] [employer] Direct Connect via the Senior [employer] Line.  PAS-RR confirmation # is : [id number]    P: Further questions may be directed to Senior [employer] Line at #[phone], option #4 for PAS-RR staff.     Cheryl Tineo, ASTRID  Social Work  [facility]

## 2025-01-06 NOTE — DISCHARGE SUMMARY
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  The following problems were addressed during his hospitalization:    Riki Alvarez is a 89 year old male admitted on 12/31/2024. He has PMH most notable for HTN, HLD, cognitive impairment, GERD, BPH that presents with progressive generalized weakness and has findings concerning for community-acquired versus aspiration pneumonia.     # Community-acquired pneumonia with streptococcal pneumonia  The patient presents with productive cough, dyspnea on exertion and progressive generalized weakness.  On exam he is AF, RR 20, satting 97% on RA.  WBC 11.6.  COVID/RSV/flu swab negative.  CXR with bibasilar infiltrates greater on the left.  CTA chest with no PE but did demonstrate infiltrates greater on the left as well and debris-filled left lower lobe.  The patient endorses aspirating his secretions from time to time but not solids or liquids.  *Procalcitonin: 0.31  - On ceftriaxone and azithromycin.  Completed 5-day course.  - SLP consult.  Recommending regular texture diet and thin liquids  - RSV/flu/COVID swab negative  - Strep pneumo urinary antigen positive  --,  Legionella urinary antigen negative, sputum culture and Gram stain to be collected  - Head of bed > 30 degrees  -Respiratory viral panel negative.  Repeat CBC shows resolved leukocytosis.     #Left hip pain  #Generalized weakness  #Bilateral lower extremity pain  The patient presents with progressive generalized weakness, pain in his knees, lower extremity musculature, and left hip.  Denies recent falls.  On exam he is able to flex his hips and knees .  X-ray pelvis with no acute fracture but does demonstrate mild degenerative arthritis of both hips.  Differential includes osteoarthritis (he did previously receive steroid injections in his knees) v occult fracture.  Less likely polymyalgia rheumatica or rhabdomyolysis.  Alternatively he could just have progressive generalized weakness in the setting of pneumonia and debility.  *CK total 83  *TSH: 1.28  *Sed rate: 82  - CT left hip shows no acute findings.  No fracture.  - PT/OT consulted.  Recommending TCU.  Social work consulted.  - Tylenol as needed for pain     #Elevated troponin  #Suspected pleurisy  #Noncardiac chest pain  The patient presents with several days of chest pain that is only present when he coughs or takes deep breaths. On exam he was asked to cough which did reproduce the pain.  No pain when he is not coughing.  EKG sinus rhythm with sinus arrhythmia, right bundle branch block, left anterior fascicular block.  No contiguous ST segment elevation/depressions or diffuse T wave inversions.  Troponin is mildly elevated and downtrending and likely due to demand ischemia in the setting of pneumonia.  CTA chest negative for PE.  No friction rubs on exam to suggest pericarditis.  *Troponin 39-->37  - Tylenol as needed  --Currently chest pain-free     #Elevated D-dimer  - D-dimer significantly elevated at 3.31  - CTA chest negative for PE  -Bilateral LE ultrasound: No deep venous thrombosis in the bilateral lower extremities.   - Continue lovenox for DVT  prophylaxis     #Grief  - The patient lost his wife in 10/2024.  He states he is depressed secondary to this but denies any suicidal ideation.     #Hyperglycemia  -A1c: 5.8   -BG 1/1/2025 with AM labs: 100  -Monitor glucose with labs for now     #HTN  -Continue PTA losartan 25 mg daily, metoprolol succinate 25 mg daily     #HLD  - Continue PTA Crestor 10 mg daily     #BPH  - Continue PTA Flomax     #Cognitive impairment  - Continue PTA donepezil    Chronic anemia  -Hemoglobin stable between 10 and 11           Clinically Significant Risk Factors               # Hypoalbuminemia: Lowest albumin = 2.7 g/dL at 1/1/2025  6:25 AM, will monitor as appropriate                            Tanya Murguia MD, MD        Code Status   No CPR.  Prearrest intubation reymundo       Primary Care Physician   [facility]    Physical Exam   Temp: 97.4  F (36.3  C) Temp src: Oral BP: (!) 156/66 Pulse: 70   Resp: 16 SpO2: 95 % O2 Device: None (Room air)    Vitals:    12/31/24 1623 01/01/25 1144   Weight: 65.8 kg (145 lb) 68.7 kg (151 lb 7.3 oz)     Vital Signs with Ranges  Temp:  [97.4  F (36.3  C)-98.3  F (36.8  C)] 97.4  F (36.3  C)  Pulse:  [67-77] 70  Resp:  [16-18] 16  BP: (133-156)/(7-68) 156/66  SpO2:  [95 %-96 %] 95 %  I/O last 3 completed shifts:  In: 960 [P.O.:960]  Out: -     Physical Exam  Constitutional:       Appearance: Normal appearance.      Comments: Old and frail   Cardiovascular:      Rate and Rhythm: Normal rate and regular rhythm.      Pulses: Normal pulses.      Heart sounds: Normal heart sounds.   Pulmonary:      Effort: Pulmonary effort is normal. No respiratory distress.      Breath sounds: Normal breath sounds.   Abdominal:      General: Abdomen is flat. Bowel sounds are normal. There is no distension.      Tenderness: There is no abdominal tenderness. There is no guarding.   Skin:     General: Skin is warm and dry.   Neurological:      General: No focal deficit present.           Discharge Disposition    Discharged to short-term care facility  Condition at discharge: Stable    Consultations This Hospital Stay   SPEECH LANGUAGE PATH ADULT IP CONSULT  PHYSICAL THERAPY ADULT IP CONSULT  OCCUPATIONAL THERAPY ADULT IP CONSULT  CARE MANAGEMENT / SOCIAL WORK IP CONSULT  PHYSICAL THERAPY ADULT IP CONSULT  OCCUPATIONAL THERAPY ADULT IP CONSULT    Time Spent on this Encounter   ITanya MD, personally saw the patient today and spent greater than 30 minutes discharging this patient.    Discharge Orders      General info for SNF    Length of Stay Estimate: Short Term Care: Estimated # of Days <30  Condition at Discharge: Stable  Level of care:skilled   Rehabilitation Potential: Good  Admission H&P remains valid and up-to-date: Yes  Recent Chemotherapy: N/A  Use Nursing Home Standing Orders: Yes     Mantoux instructions    Give two-step Mantoux (PPD) Per Facility Policy Yes     Follow Up and recommended labs and tests    Follow up with halfway physician.  The following labs/tests are recommended: cbc, bmp in 1 week .     Reason for your hospital stay    Pneumonia     Activity - Up ad librado     Physical Therapy Adult Consult    Evaluate and treat as clinically indicated.    Reason:  weakness     Occupational Therapy Adult Consult    Evaluate and treat as clinically indicated.    Reason:  weakness     Fall precautions     Diet    Follow this diet upon discharge: Current Diet:Orders Placed This Encounter      Combination Diet Regular Diet; Thin Liquids (level 0)     Discharge Medications   Current Discharge Medication List        CONTINUE these medications which have CHANGED    Details   acetaminophen (TYLENOL) 325 MG tablet Take 2 tablets (650 mg) by mouth every 4 hours as needed for mild pain or other (and adjunct with moderate or severe pain or per patient request).    Associated Diagnoses: Dyspnea on exertion      donepezil (ARICEPT) 5 MG tablet Take 1 tablet (5 mg) by mouth every evening.    Associated  Diagnoses: Mild dementia associated with other underlying disease, without behavioral disturbance, psychotic disturbance, mood disturbance, or anxiety (H)      famciclovir (FAMVIR) 500 MG tablet Take 1 tablet (500 mg) by mouth 2 times daily.    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    Associated Diagnoses: Gastroesophageal reflux disease with esophagitis without hemorrhage      fluorometholone (FLAREX) 0.1 % ophthalmic suspension Place 1 drop into the right eye every morning.    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    Associated Diagnoses: Benign essential hypertension      metoprolol succinate ER (TOPROL XL) 25 MG 24 hr tablet Take 1 tablet (25 mg) by mouth daily.    Associated Diagnoses: Benign essential hypertension      rosuvastatin (CRESTOR) 10 MG tablet Take 1 tablet (10 mg) by mouth daily.    Associated Diagnoses: Hyperlipidemia LDL goal <130      tamsulosin (FLOMAX) 0.4 MG capsule Take 1 capsule (0.4 mg) by mouth every evening.    Associated Diagnoses: Benign prostatic hyperplasia without lower urinary tract symptoms           STOP taking these medications       traZODone (DESYREL) 50 MG tablet Comments:   Reason for Stopping:             Allergies   No Known Allergies  Data   Recent Labs   Lab Test 01/04/25  0901 01/04/25  0150 01/02/25  0957 01/01/25  0625   WBC 7.5  --  11.7* 8.4   HGB 11.6*  --  12.9* 10.5*   MCV 97  --  96 95    390 384 286   INR  --   --   --  1.22*      Recent Labs   Lab Test 01/02/25  0957 01/01/25  0625 12/31/24  1704    141 143   POTASSIUM 4.1 4.1 4.5   CHLORIDE 101 107 107   CO2 26 26 26   BUN 9.2 15.0 22.0   CR 0.70 0.77 0.97   ANIONGAP 12 8 10   MARITZA 9.0 8.4* 9.2   * 100* 133*         Results for orders placed or performed during the hospital  encounter of 12/31/24   Chest XR,  PA & LAT    Narrative    EXAM: XR CHEST 2 VIEWS  LOCATION: [facility]  DATE: 12/31/2024    INDICATION: Shortness of breath  COMPARISON: None.      Impression    IMPRESSION: Reinflation of both lungs. Mild to moderate amount of by basilar infiltrates greatest on the left. Minimal pleural fluid or thickening bilaterally greatest on the left. Minimally calcified thoracic aortic arch. Moderate hypertrophic changes   of the spine. Slight bilateral apical pleural scarring.   XR Pelvis 1/2 Views    Narrative    EXAM: XR PELVIS 1/2 VIEWS  LOCATION: [facility]  DATE: 12/31/2024    INDICATION: 2 weeks of atraumatic pelvic pain  COMPARISON: None.      Impression    IMPRESSION: No acute fracture or malalignment. Mild degenerative arthritis of both hips, slightly greater on the left. Multilevel degenerative changes lower lumbar spine. Osseous demineralization.   CT Chest Pulmonary Embolism w Contrast    Narrative    EXAM: CT CHEST PULMONARY EMBOLISM W CONTRAST  LOCATION: [facility]  DATE: 12/31/2024    INDICATION: Two weeks of increased exertional shortness of breath, elevated dimer, rule out evidence of PE or other pathology.  COMPARISON: Chest x-ray 2 views 12/31/2024 at 2058 hours.  TECHNIQUE: CT chest pulmonary angiogram during arterial phase injection of IV contrast. Multiplanar reformats and MIP reconstructions were performed. Dose reduction techniques were used.   CONTRAST: 59 mL Isovue 370.    FINDINGS:  ANGIOGRAM CHEST: No pulmonary emboli on either side. Atherosclerotic normal caliber thoracic aorta without aneurysm or dissection. No CT evidence of right heart strain.    LUNGS AND PLEURA: Mild emphysematous changes. Scarring in the apices. Airspace infiltrates in both lungs, involving the right middle and both lower lobes, most apparent involving the left lower lobe. Debris-filled left lower lobe tubular  bronchiectasis.   Small left and tiny right pleural effusions.    MEDIASTINUM/AXILLAE: Normal cardiac size. Moderate coronary artery calcifications. No pericardial effusion. No suspicious adenopathy. Trachea is midline.    CORONARY ARTERY CALCIFICATION: Moderate.    UPPER ABDOMEN: Cholelithiasis. Atherosclerotic changes.    MUSCULOSKELETAL: Degenerative changes both shoulders and the thoracic spine.      Impression    IMPRESSION:  1.  No pulmonary emboli on either side. Airspace infiltrates bilaterally, more apparent involving the left lower lobe. Debris-filled left lower lobe tubular bronchiectasis. Findings most likely represent an infectious or an inflammatory process. Small   left and tiny right pleural effusions.    2.  Atherosclerotic normal caliber thoracic aorta without aneurysm or dissection. Normal cardiac size. Moderate coronary artery calcifications.   CT Hip Left w/o Contrast    Narrative    EXAM: CT HIP LEFT W/O CONTRAST  LOCATION: [facility]  DATE: 1/1/2025    INDICATION: Left hip pain. Concern for occult fracture.  COMPARISON: None.  TECHNIQUE: Noncontrast. Axial, sagittal and coronal thin-section reconstruction. Dose reduction techniques were used.     FINDINGS:   No acute fracture.    Mild degenerative arthritis of the left hip joint. Degenerative change of the left SI joint.    No fluid collection or hematoma. Enlarged prostate. Contrast in the bladder lumen and bladder diverticula.      Impression    IMPRESSION:  1.  No acute appearing abnormality. No acute fracture. MRI is more sensitive and should be considered if there is high clinical suspicion or symptoms persist.  2.  Mild degenerative arthritis of the left hip joint     US Lower Extremity Venous Duplex Bilateral    Narrative    EXAM: ULTRASOUND LOWER EXTREMITY VENOUS DUPLEX BILATERAL  LOCATION: [facility]  DATE: 01/01/2025    INDICATION: Leg pain and edema, evaluate for DVT.  COMPARISON:  None available.  TECHNIQUE: Venous Duplex ultrasound of bilateral lower extremities with and without compression, augmentation and duplex. Color flow and spectral Doppler with waveform analysis performed.    FINDINGS: Exam includes the common femoral, femoral, popliteal veins as well as segmentally visualized deep calf veins and greater saphenous vein.     RIGHT: No deep vein thrombosis. No superficial thrombophlebitis. No popliteal cyst.    LEFT: No deep vein thrombosis. No superficial thrombophlebitis. No popliteal cyst.      Impression    IMPRESSION:  1.  No deep venous thrombosis in the bilateral lower extremities.

## 2025-01-06 NOTE — PROGRESS NOTES
PRIMARY Concern: L hip pain and pneumonia   SAFETY RISK Concerns (fall risk, behaviors, etc.): Fall       Aggression Tool Color: Green   Isolation/Type: None   Tests/Procedures for NEXT shift: None   Consults? (Pending/following, signed-off?) PT rec TCU. SW following for TCU placement.  Where is patient from? (Home, TCU, etc.): Home alone  Other Important info for NEXT shift:  [facility] John[facility]. Stefania providing ride. Plan was for discharge today to [facility], that did not happen. Awaiting confirmation from [facility] when they can confirm to take pt.  Anticipated DC date & active delays: TBD possibly discharge tomorrow 1/6 to [facility] TCU.    _________________________________________________________________________  SUMMARY NOTE:  Orientation/Cognitive: A&Ox4  Observation Goals (Met/ Not Met): Inpatient  Mobility Level/Assist Equipment: A1 gb/w. Sat up in chair this shift.  Antibiotics & Plan (IV/po, length of tx left): completed  Pain Management: Denies   Tele/VS/O2: VSS on RA  ABNL Lab/BG: Hgb- 11.6  Diet: Regular   Bowel/Bladder: Continent  Skin Concerns: Scattered bruising   Drains/Devices: PIV SL  Patient Stated Goal for Today: To go home

## 2025-01-06 NOTE — PLAN OF CARE
PRIMARY Concern: L hip pain and pneumonia   SAFETY RISK Concerns (fall risk, behaviors, etc.): Fall       Aggression Tool Color: Green   Isolation/Type: None   Tests/Procedures for NEXT shift: None   Consults? (Pending/following, signed-off?) PT rec TCU. SW following for TCU placement.  Where is patient from? (Home, TCU, etc.): Home alone  Other Important info for NEXT shift:  Please call John, when [employer] c/b w/time. Stefania providing ride. Plan was for discharge yesterday to [facility], that did not happen. Awaiting confirmation from [facility] when they can confirm to take pt.  Anticipated DC date & active delays: TBD [facility].    _________________________________________________________________________  SUMMARY NOTE:  Orientation/Cognitive: A&Ox4  Observation Goals (Met/ Not Met): Inpatient  Mobility Level/Assist Equipment: A1 gb/w. Sat up in chair this shift.  Antibiotics & Plan (IV/po, length of tx left): completed  Pain Management: Denies   Tele/VS/O2: VSS on RA  ABNL Lab/BG: See Chart  Diet: Regular   Bowel/Bladder: Continent  Skin Concerns: Scattered bruising   Drains/Devices: PIV SL

## 2025-01-06 NOTE — PLAN OF CARE
Goal Outcome Evaluation: Met    Pt a/o X4. Fogetful.  VSS. No pain. Tolerating reg diet. Voiding. Ambulating. Randy paper work reviewed with pt. Verbalizes understanding. PIV removed. All questions answered. Follow up aware. Dc home with family to [facility] at 1300

## 2025-01-06 NOTE — PLAN OF CARE
Physical Therapy Discharge Summary    Reason for therapy discharge:    Discharged to transitional care facility.    Progress towards therapy goal(s). See goals on Care Plan in [facility] electronic health record for goal details.  Goals partially met.  Barriers to achieving goals:   discharge from facility.    Therapy recommendation(s):    Continued therapy is recommended.  Rationale/Recommendations:Patient is below baseline level of independence with mobility and lives alone in a two story house. He is requiring Ax1 during transfers and ambulation with support of a walker. He will benefit from skilled PT intervention at TCU to progress strength,  balance and independence wiht mobility. Of note, Patient currently does not want to TCU. If patient were to d/c home he would need Ax1 and HHPT.  PT Brief overview of current status: CGA to Min Ax 1; Goals of therapy will be to address safe mobility and make recs for d/c to next level of care. Pt and RN will continue to follow all falls risk precautions as documented by RN staff while hospitalized.  PT Equipment Needed at Discharge: walker, rolling (has at home)    Recommendation above provided by last treating therapist.

## 2025-01-07 NOTE — PROGRESS NOTES
Occupational Therapy Discharge Summary    Reason for therapy discharge:    Discharged to transitional care facility.    Progress towards therapy goal(s). See goals on Care Plan in [facility] electronic health record for goal details.  Goals partially met.  Barriers to achieving goals:   discharge from facility.    Therapy recommendation(s):    Continued therapy is recommended.  Rationale/Recommendations:  to progress indp aDL.

## 2025-02-12 ENCOUNTER — HOSPITAL ENCOUNTER (OUTPATIENT)
Facility: CLINIC | Age: OVER 89
Setting detail: OBSERVATION
End: 2025-02-12
Attending: EMERGENCY MEDICINE | Admitting: HOSPITALIST
Payer: MEDICARE

## 2025-02-12 DIAGNOSIS — R79.89 ELEVATED TROPONIN: ICD-10-CM

## 2025-02-12 DIAGNOSIS — D64.9 ANEMIA, UNSPECIFIED TYPE: ICD-10-CM

## 2025-02-12 DIAGNOSIS — R53.1 GENERAL WEAKNESS: Primary | ICD-10-CM

## 2025-02-12 DIAGNOSIS — K92.1 MELENA: ICD-10-CM

## 2025-02-12 PROBLEM — I10 HYPERTENSIVE DISEASE: Status: ACTIVE | Noted: 2021-07-21

## 2025-02-12 PROBLEM — F41.9 ANXIETY: Status: ACTIVE | Noted: 2021-07-21

## 2025-02-12 PROBLEM — N40.1 LOWER URINARY TRACT SYMPTOMS DUE TO BENIGN PROSTATIC HYPERPLASIA: Status: ACTIVE | Noted: 2023-10-24

## 2025-02-12 PROBLEM — K44.9 HIATAL HERNIA: Status: ACTIVE | Noted: 2019-03-11

## 2025-02-12 PROBLEM — F02.A0: Status: ACTIVE | Noted: 2025-01-11

## 2025-02-12 PROBLEM — N40.0 BPH WITHOUT URINARY OBSTRUCTION: Status: ACTIVE | Noted: 2023-10-16

## 2025-02-12 LAB
ALBUMIN SERPL BCG-MCNC: 3.6 G/DL (ref 3.5–5.2)
ALP SERPL-CCNC: 57 U/L (ref 40–150)
ALT SERPL W P-5'-P-CCNC: 10 U/L (ref 0–70)
ANION GAP SERPL CALCULATED.3IONS-SCNC: 9 MMOL/L (ref 7–15)
AST SERPL W P-5'-P-CCNC: 19 U/L (ref 0–45)
BASOPHILS # BLD AUTO: 0.1 10E3/UL (ref 0–0.2)
BASOPHILS NFR BLD AUTO: 1 %
BILIRUB SERPL-MCNC: 0.8 MG/DL
BUN SERPL-MCNC: 13.9 MG/DL (ref 8–23)
CALCIUM SERPL-MCNC: 8.6 MG/DL (ref 8.8–10.4)
CHLORIDE SERPL-SCNC: 106 MMOL/L (ref 98–107)
CREAT SERPL-MCNC: 0.93 MG/DL (ref 0.67–1.17)
EGFRCR SERPLBLD CKD-EPI 2021: 78 ML/MIN/1.73M2
EOSINOPHIL # BLD AUTO: 0.1 10E3/UL (ref 0–0.7)
EOSINOPHIL NFR BLD AUTO: 2 %
ERYTHROCYTE [DISTWIDTH] IN BLOOD BY AUTOMATED COUNT: 14.6 % (ref 10–15)
GLUCOSE SERPL-MCNC: 125 MG/DL (ref 70–99)
HCO3 SERPL-SCNC: 28 MMOL/L (ref 22–29)
HCT VFR BLD AUTO: 33 % (ref 40–53)
HGB BLD-MCNC: 10.8 G/DL (ref 13.3–17.7)
HOLD SPECIMEN: NORMAL
IMM GRANULOCYTES # BLD: 0 10E3/UL
IMM GRANULOCYTES NFR BLD: 0 %
LYMPHOCYTES # BLD AUTO: 1 10E3/UL (ref 0.8–5.3)
LYMPHOCYTES NFR BLD AUTO: 17 %
MCH RBC QN AUTO: 32 PG (ref 26.5–33)
MCHC RBC AUTO-ENTMCNC: 32.7 G/DL (ref 31.5–36.5)
MCV RBC AUTO: 98 FL (ref 78–100)
MONOCYTES # BLD AUTO: 0.4 10E3/UL (ref 0–1.3)
MONOCYTES NFR BLD AUTO: 7 %
NEUTROPHILS # BLD AUTO: 4.3 10E3/UL (ref 1.6–8.3)
NEUTROPHILS NFR BLD AUTO: 73 %
NRBC # BLD AUTO: 0 10E3/UL
NRBC BLD AUTO-RTO: 0 /100
PLATELET # BLD AUTO: 194 10E3/UL (ref 150–450)
POTASSIUM SERPL-SCNC: 4.1 MMOL/L (ref 3.4–5.3)
PROT SERPL-MCNC: 6.4 G/DL (ref 6.4–8.3)
RBC # BLD AUTO: 3.37 10E6/UL (ref 4.4–5.9)
SODIUM SERPL-SCNC: 143 MMOL/L (ref 135–145)
TROPONIN T SERPL HS-MCNC: 39 NG/L
TROPONIN T SERPL HS-MCNC: 40 NG/L
WBC # BLD AUTO: 5.9 10E3/UL (ref 4–11)

## 2025-02-12 PROCEDURE — 36415 COLL VENOUS BLD VENIPUNCTURE: CPT | Performed by: EMERGENCY MEDICINE

## 2025-02-12 PROCEDURE — 80053 COMPREHEN METABOLIC PANEL: CPT | Performed by: EMERGENCY MEDICINE

## 2025-02-12 PROCEDURE — G0378 HOSPITAL OBSERVATION PER HR: HCPCS

## 2025-02-12 PROCEDURE — 96374 THER/PROPH/DIAG INJ IV PUSH: CPT

## 2025-02-12 PROCEDURE — 84484 ASSAY OF TROPONIN QUANT: CPT | Performed by: EMERGENCY MEDICINE

## 2025-02-12 PROCEDURE — 85025 COMPLETE CBC W/AUTO DIFF WBC: CPT | Performed by: EMERGENCY MEDICINE

## 2025-02-12 PROCEDURE — 250N000013 HC RX MED GY IP 250 OP 250 PS 637: Performed by: EMERGENCY MEDICINE

## 2025-02-12 PROCEDURE — 99223 1ST HOSP IP/OBS HIGH 75: CPT | Mod: AI | Performed by: HOSPITALIST

## 2025-02-12 PROCEDURE — 99285 EMERGENCY DEPT VISIT HI MDM: CPT | Mod: 25

## 2025-02-12 PROCEDURE — 250N000009 HC RX 250: Performed by: HOSPITALIST

## 2025-02-12 PROCEDURE — 93005 ELECTROCARDIOGRAM TRACING: CPT

## 2025-02-12 PROCEDURE — 250N000013 HC RX MED GY IP 250 OP 250 PS 637: Performed by: HOSPITALIST

## 2025-02-12 RX ORDER — ROSUVASTATIN CALCIUM 10 MG/1
10 TABLET, COATED ORAL DAILY
Status: DISCONTINUED | OUTPATIENT
Start: 2025-02-13 | End: 2025-02-15 | Stop reason: HOSPADM

## 2025-02-12 RX ORDER — AMOXICILLIN 250 MG
1 CAPSULE ORAL 2 TIMES DAILY PRN
Status: DISCONTINUED | OUTPATIENT
Start: 2025-02-12 | End: 2025-02-15 | Stop reason: HOSPADM

## 2025-02-12 RX ORDER — FUROSEMIDE 20 MG/1
10 TABLET ORAL EVERY MORNING
Status: DISCONTINUED | OUTPATIENT
Start: 2025-02-13 | End: 2025-02-15 | Stop reason: HOSPADM

## 2025-02-12 RX ORDER — ACETAMINOPHEN 325 MG/1
650 TABLET ORAL EVERY 4 HOURS PRN
Status: DISCONTINUED | OUTPATIENT
Start: 2025-02-12 | End: 2025-02-15 | Stop reason: HOSPADM

## 2025-02-12 RX ORDER — ONDANSETRON 2 MG/ML
4 INJECTION INTRAMUSCULAR; INTRAVENOUS EVERY 6 HOURS PRN
Status: DISCONTINUED | OUTPATIENT
Start: 2025-02-12 | End: 2025-02-15 | Stop reason: HOSPADM

## 2025-02-12 RX ORDER — TOLTERODINE 2 MG/1
2 CAPSULE, EXTENDED RELEASE ORAL EVERY MORNING
COMMUNITY

## 2025-02-12 RX ORDER — FAMOTIDINE 20 MG/1
20 TABLET, FILM COATED ORAL 2 TIMES DAILY
Status: CANCELLED | OUTPATIENT
Start: 2025-02-12

## 2025-02-12 RX ORDER — AMOXICILLIN 250 MG
2 CAPSULE ORAL 2 TIMES DAILY PRN
Status: DISCONTINUED | OUTPATIENT
Start: 2025-02-12 | End: 2025-02-15 | Stop reason: HOSPADM

## 2025-02-12 RX ORDER — CARBOXYMETHYLCELLULOSE SODIUM 5 MG/ML
1 SOLUTION/ DROPS OPHTHALMIC DAILY PRN
Status: DISCONTINUED | OUTPATIENT
Start: 2025-02-12 | End: 2025-02-15 | Stop reason: HOSPADM

## 2025-02-12 RX ORDER — FUROSEMIDE 20 MG/1
10 TABLET ORAL EVERY MORNING
COMMUNITY

## 2025-02-12 RX ORDER — ESCITALOPRAM OXALATE 5 MG/1
5 TABLET ORAL AT BEDTIME
Status: DISCONTINUED | OUTPATIENT
Start: 2025-02-12 | End: 2025-02-15 | Stop reason: HOSPADM

## 2025-02-12 RX ORDER — ACETAMINOPHEN 650 MG/1
650 SUPPOSITORY RECTAL EVERY 4 HOURS PRN
Status: DISCONTINUED | OUTPATIENT
Start: 2025-02-12 | End: 2025-02-15 | Stop reason: HOSPADM

## 2025-02-12 RX ORDER — METOPROLOL SUCCINATE 25 MG/1
25 TABLET, EXTENDED RELEASE ORAL DAILY
Status: DISCONTINUED | OUTPATIENT
Start: 2025-02-13 | End: 2025-02-15 | Stop reason: HOSPADM

## 2025-02-12 RX ORDER — FLUOROMETHOLONE 1 MG/ML
1 SUSPENSION/ DROPS OPHTHALMIC EVERY MORNING
Status: DISCONTINUED | OUTPATIENT
Start: 2025-02-13 | End: 2025-02-15 | Stop reason: HOSPADM

## 2025-02-12 RX ORDER — ONDANSETRON 4 MG/1
4 TABLET, ORALLY DISINTEGRATING ORAL EVERY 6 HOURS PRN
Status: DISCONTINUED | OUTPATIENT
Start: 2025-02-12 | End: 2025-02-15 | Stop reason: HOSPADM

## 2025-02-12 RX ORDER — PROCHLORPERAZINE MALEATE 5 MG/1
5 TABLET ORAL EVERY 6 HOURS PRN
Status: DISCONTINUED | OUTPATIENT
Start: 2025-02-12 | End: 2025-02-15 | Stop reason: HOSPADM

## 2025-02-12 RX ORDER — TOLTERODINE 2 MG/1
2 CAPSULE, EXTENDED RELEASE ORAL EVERY MORNING
Status: DISCONTINUED | OUTPATIENT
Start: 2025-02-13 | End: 2025-02-15 | Stop reason: HOSPADM

## 2025-02-12 RX ORDER — CARBOXYMETHYLCELLULOSE SODIUM 5 MG/ML
1 SOLUTION/ DROPS OPHTHALMIC EVERY MORNING
Status: DISCONTINUED | OUTPATIENT
Start: 2025-02-13 | End: 2025-02-15 | Stop reason: HOSPADM

## 2025-02-12 RX ORDER — ACETAMINOPHEN 325 MG/1
650 TABLET ORAL EVERY 4 HOURS PRN
Status: DISCONTINUED | OUTPATIENT
Start: 2025-02-12 | End: 2025-02-12

## 2025-02-12 RX ORDER — DONEPEZIL HYDROCHLORIDE 5 MG/1
5 TABLET, FILM COATED ORAL EVERY EVENING
Status: DISCONTINUED | OUTPATIENT
Start: 2025-02-12 | End: 2025-02-15 | Stop reason: HOSPADM

## 2025-02-12 RX ORDER — LOSARTAN POTASSIUM 25 MG/1
25 TABLET ORAL DAILY
Status: DISCONTINUED | OUTPATIENT
Start: 2025-02-13 | End: 2025-02-15 | Stop reason: HOSPADM

## 2025-02-12 RX ORDER — ESCITALOPRAM OXALATE 5 MG/1
5 TABLET ORAL AT BEDTIME
COMMUNITY

## 2025-02-12 RX ORDER — VALACYCLOVIR HYDROCHLORIDE 1 G/1
1000 TABLET, FILM COATED ORAL 3 TIMES DAILY
Status: DISCONTINUED | OUTPATIENT
Start: 2025-02-12 | End: 2025-02-15 | Stop reason: HOSPADM

## 2025-02-12 RX ORDER — FAMCICLOVIR 500 MG/1
500 TABLET ORAL 2 TIMES DAILY
Status: DISCONTINUED | OUTPATIENT
Start: 2025-02-12 | End: 2025-02-12

## 2025-02-12 RX ORDER — TAMSULOSIN HYDROCHLORIDE 0.4 MG/1
0.4 CAPSULE ORAL EVERY EVENING
Status: DISCONTINUED | OUTPATIENT
Start: 2025-02-12 | End: 2025-02-15 | Stop reason: HOSPADM

## 2025-02-12 RX ADMIN — TAMSULOSIN HYDROCHLORIDE 0.4 MG: 0.4 CAPSULE ORAL at 21:36

## 2025-02-12 RX ADMIN — DONEPEZIL HYDROCHLORIDE 5 MG: 5 TABLET, FILM COATED ORAL at 21:36

## 2025-02-12 RX ADMIN — ESCITALOPRAM OXALATE 5 MG: 5 TABLET, FILM COATED ORAL at 21:36

## 2025-02-12 RX ADMIN — VALACYCLOVIR HYDROCHLORIDE 1000 MG: 1 TABLET, FILM COATED ORAL at 21:36

## 2025-02-12 RX ADMIN — PANTOPRAZOLE SODIUM 40 MG: 40 INJECTION, POWDER, FOR SOLUTION INTRAVENOUS at 21:36

## 2025-02-12 ASSESSMENT — ACTIVITIES OF DAILY LIVING (ADL)
ADLS_ACUITY_SCORE: 54

## 2025-02-12 ASSESSMENT — COLUMBIA-SUICIDE SEVERITY RATING SCALE - C-SSRS
2. HAVE YOU ACTUALLY HAD ANY THOUGHTS OF KILLING YOURSELF IN THE PAST MONTH?: NO
1. IN THE PAST MONTH, HAVE YOU WISHED YOU WERE DEAD OR WISHED YOU COULD GO TO SLEEP AND NOT WAKE UP?: NO
6. HAVE YOU EVER DONE ANYTHING, STARTED TO DO ANYTHING, OR PREPARED TO DO ANYTHING TO END YOUR LIFE?: NO

## 2025-02-13 VITALS
OXYGEN SATURATION: 94 % | SYSTOLIC BLOOD PRESSURE: 169 MMHG | BODY MASS INDEX: 22.33 KG/M2 | DIASTOLIC BLOOD PRESSURE: 85 MMHG | HEART RATE: 80 BPM | WEIGHT: 156 LBS | HEIGHT: 70 IN | TEMPERATURE: 98.5 F | RESPIRATION RATE: 16 BRPM

## 2025-02-13 LAB
ANION GAP SERPL CALCULATED.3IONS-SCNC: 8 MMOL/L (ref 7–15)
ATRIAL RATE - MUSE: 56 BPM
BUN SERPL-MCNC: 11.4 MG/DL (ref 8–23)
CALCIUM SERPL-MCNC: 8.5 MG/DL (ref 8.8–10.4)
CHLORIDE SERPL-SCNC: 105 MMOL/L (ref 98–107)
CREAT SERPL-MCNC: 0.85 MG/DL (ref 0.67–1.17)
DIASTOLIC BLOOD PRESSURE - MUSE: NORMAL MMHG
EGFRCR SERPLBLD CKD-EPI 2021: 83 ML/MIN/1.73M2
ERYTHROCYTE [DISTWIDTH] IN BLOOD BY AUTOMATED COUNT: 14.4 % (ref 10–15)
GLUCOSE SERPL-MCNC: 91 MG/DL (ref 70–99)
HCO3 SERPL-SCNC: 29 MMOL/L (ref 22–29)
HCT VFR BLD AUTO: 32 % (ref 40–53)
HEMOCCULT STL QL: NEGATIVE
HGB BLD-MCNC: 10.5 G/DL (ref 13.3–17.7)
HGB BLD-MCNC: 10.5 G/DL (ref 13.3–17.7)
HGB BLD-MCNC: 11.1 G/DL (ref 13.3–17.7)
INTERPRETATION ECG - MUSE: NORMAL
MCH RBC QN AUTO: 32.1 PG (ref 26.5–33)
MCHC RBC AUTO-ENTMCNC: 32.8 G/DL (ref 31.5–36.5)
MCV RBC AUTO: 98 FL (ref 78–100)
P AXIS - MUSE: 57 DEGREES
PLATELET # BLD AUTO: 157 10E3/UL (ref 150–450)
POTASSIUM SERPL-SCNC: 4.3 MMOL/L (ref 3.4–5.3)
PR INTERVAL - MUSE: 216 MS
QRS DURATION - MUSE: 132 MS
QT - MUSE: 456 MS
QTC - MUSE: 440 MS
R AXIS - MUSE: -50 DEGREES
RBC # BLD AUTO: 3.27 10E6/UL (ref 4.4–5.9)
SODIUM SERPL-SCNC: 142 MMOL/L (ref 135–145)
SYSTOLIC BLOOD PRESSURE - MUSE: NORMAL MMHG
T AXIS - MUSE: 17 DEGREES
UPPER GI ENDOSCOPY: NORMAL
VENTRICULAR RATE- MUSE: 56 BPM
WBC # BLD AUTO: 4.6 10E3/UL (ref 4–11)

## 2025-02-13 PROCEDURE — 250N000013 HC RX MED GY IP 250 OP 250 PS 637: Performed by: HOSPITALIST

## 2025-02-13 PROCEDURE — 250N000009 HC RX 250: Performed by: HOSPITALIST

## 2025-02-13 PROCEDURE — 250N000013 HC RX MED GY IP 250 OP 250 PS 637: Performed by: EMERGENCY MEDICINE

## 2025-02-13 PROCEDURE — 88305 TISSUE EXAM BY PATHOLOGIST: CPT | Mod: TC | Performed by: INTERNAL MEDICINE

## 2025-02-13 PROCEDURE — 250N000009 HC RX 250: Performed by: INTERNAL MEDICINE

## 2025-02-13 PROCEDURE — 82310 ASSAY OF CALCIUM: CPT | Performed by: HOSPITALIST

## 2025-02-13 PROCEDURE — 250N000011 HC RX IP 250 OP 636: Performed by: NURSE PRACTITIONER

## 2025-02-13 PROCEDURE — 99233 SBSQ HOSP IP/OBS HIGH 50: CPT | Performed by: NURSE PRACTITIONER

## 2025-02-13 PROCEDURE — 250N000011 HC RX IP 250 OP 636: Performed by: INTERNAL MEDICINE

## 2025-02-13 PROCEDURE — 96361 HYDRATE IV INFUSION ADD-ON: CPT | Mod: 59

## 2025-02-13 PROCEDURE — 999N000099 HC STATISTIC MODERATE SEDATION < 10 MIN: Performed by: INTERNAL MEDICINE

## 2025-02-13 PROCEDURE — G0378 HOSPITAL OBSERVATION PER HR: HCPCS

## 2025-02-13 PROCEDURE — 36415 COLL VENOUS BLD VENIPUNCTURE: CPT | Performed by: HOSPITALIST

## 2025-02-13 PROCEDURE — 258N000003 HC RX IP 258 OP 636: Performed by: NURSE PRACTITIONER

## 2025-02-13 PROCEDURE — 96375 TX/PRO/DX INJ NEW DRUG ADDON: CPT

## 2025-02-13 PROCEDURE — 250N000013 HC RX MED GY IP 250 OP 250 PS 637: Performed by: INTERNAL MEDICINE

## 2025-02-13 PROCEDURE — 85018 HEMOGLOBIN: CPT | Performed by: HOSPITALIST

## 2025-02-13 PROCEDURE — 43235 EGD DIAGNOSTIC BRUSH WASH: CPT | Performed by: INTERNAL MEDICINE

## 2025-02-13 PROCEDURE — 85041 AUTOMATED RBC COUNT: CPT | Performed by: HOSPITALIST

## 2025-02-13 PROCEDURE — 96361 HYDRATE IV INFUSION ADD-ON: CPT

## 2025-02-13 PROCEDURE — 82272 OCCULT BLD FECES 1-3 TESTS: CPT | Performed by: EMERGENCY MEDICINE

## 2025-02-13 PROCEDURE — 96376 TX/PRO/DX INJ SAME DRUG ADON: CPT | Mod: 59

## 2025-02-13 RX ORDER — NALOXONE HYDROCHLORIDE 0.4 MG/ML
0.2 INJECTION, SOLUTION INTRAMUSCULAR; INTRAVENOUS; SUBCUTANEOUS
Status: DISCONTINUED | OUTPATIENT
Start: 2025-02-13 | End: 2025-02-15 | Stop reason: HOSPADM

## 2025-02-13 RX ORDER — SODIUM CHLORIDE, SODIUM LACTATE, POTASSIUM CHLORIDE, CALCIUM CHLORIDE 600; 310; 30; 20 MG/100ML; MG/100ML; MG/100ML; MG/100ML
INJECTION, SOLUTION INTRAVENOUS CONTINUOUS
Status: ACTIVE | OUTPATIENT
Start: 2025-02-13 | End: 2025-02-14

## 2025-02-13 RX ORDER — NALOXONE HYDROCHLORIDE 0.4 MG/ML
0.4 INJECTION, SOLUTION INTRAMUSCULAR; INTRAVENOUS; SUBCUTANEOUS
Status: DISCONTINUED | OUTPATIENT
Start: 2025-02-13 | End: 2025-02-15 | Stop reason: HOSPADM

## 2025-02-13 RX ORDER — POLYETHYLENE GLYCOL 3350 17 G/17G
238 POWDER, FOR SOLUTION ORAL ONCE
Status: COMPLETED | OUTPATIENT
Start: 2025-02-13 | End: 2025-02-13

## 2025-02-13 RX ORDER — FLUMAZENIL 0.1 MG/ML
0.2 INJECTION, SOLUTION INTRAVENOUS
Status: ACTIVE | OUTPATIENT
Start: 2025-02-13 | End: 2025-02-14

## 2025-02-13 RX ORDER — BISACODYL 5 MG
10 TABLET, DELAYED RELEASE (ENTERIC COATED) ORAL ONCE
Status: COMPLETED | OUTPATIENT
Start: 2025-02-13 | End: 2025-02-13

## 2025-02-13 RX ORDER — HYDRALAZINE HYDROCHLORIDE 20 MG/ML
10 INJECTION INTRAMUSCULAR; INTRAVENOUS EVERY 4 HOURS PRN
Status: DISCONTINUED | OUTPATIENT
Start: 2025-02-13 | End: 2025-02-15 | Stop reason: HOSPADM

## 2025-02-13 RX ORDER — FENTANYL CITRATE 50 UG/ML
INJECTION, SOLUTION INTRAMUSCULAR; INTRAVENOUS PRN
Status: DISCONTINUED | OUTPATIENT
Start: 2025-02-13 | End: 2025-02-13 | Stop reason: HOSPADM

## 2025-02-13 RX ADMIN — CARBOXYMETHYLCELLULOSE SODIUM 1 DROP: 5 SOLUTION/ DROPS OPHTHALMIC at 08:42

## 2025-02-13 RX ADMIN — FLUOROMETHOLONE 1 DROP: 1 SUSPENSION/ DROPS OPHTHALMIC at 08:42

## 2025-02-13 RX ADMIN — TOLTERODINE 2 MG: 2 CAPSULE, EXTENDED RELEASE ORAL at 08:43

## 2025-02-13 RX ADMIN — Medication 238 G: at 19:05

## 2025-02-13 RX ADMIN — LOSARTAN POTASSIUM 25 MG: 25 TABLET, FILM COATED ORAL at 08:43

## 2025-02-13 RX ADMIN — SODIUM CHLORIDE, POTASSIUM CHLORIDE, SODIUM LACTATE AND CALCIUM CHLORIDE: 600; 310; 30; 20 INJECTION, SOLUTION INTRAVENOUS at 12:00

## 2025-02-13 RX ADMIN — DONEPEZIL HYDROCHLORIDE 5 MG: 5 TABLET, FILM COATED ORAL at 20:14

## 2025-02-13 RX ADMIN — PANTOPRAZOLE SODIUM 40 MG: 40 INJECTION, POWDER, FOR SOLUTION INTRAVENOUS at 08:42

## 2025-02-13 RX ADMIN — ESCITALOPRAM OXALATE 5 MG: 5 TABLET, FILM COATED ORAL at 22:08

## 2025-02-13 RX ADMIN — METOPROLOL SUCCINATE 25 MG: 25 TABLET, EXTENDED RELEASE ORAL at 08:42

## 2025-02-13 RX ADMIN — BISACODYL 10 MG: 5 TABLET, COATED ORAL at 12:00

## 2025-02-13 RX ADMIN — ROSUVASTATIN CALCIUM 10 MG: 10 TABLET, FILM COATED ORAL at 08:43

## 2025-02-13 RX ADMIN — SODIUM CHLORIDE, POTASSIUM CHLORIDE, SODIUM LACTATE AND CALCIUM CHLORIDE: 600; 310; 30; 20 INJECTION, SOLUTION INTRAVENOUS at 22:26

## 2025-02-13 RX ADMIN — FUROSEMIDE 10 MG: 20 TABLET ORAL at 08:43

## 2025-02-13 RX ADMIN — TAMSULOSIN HYDROCHLORIDE 0.4 MG: 0.4 CAPSULE ORAL at 20:14

## 2025-02-13 RX ADMIN — HYDRALAZINE HYDROCHLORIDE 10 MG: 20 INJECTION INTRAMUSCULAR; INTRAVENOUS at 17:50

## 2025-02-13 ASSESSMENT — ACTIVITIES OF DAILY LIVING (ADL)
ADLS_ACUITY_SCORE: 54
ADLS_ACUITY_SCORE: 56
ADLS_ACUITY_SCORE: 54

## 2025-02-13 NOTE — PHARMACY-ADMISSION MEDICATION HISTORY
Pharmacist Admission Medication History    Admission medication history is complete. The information provided in this note is only as accurate as the sources available at the time of the update.    Information Source(s): Patient, Clinic records, and CareEverywhere/SureScripts via in-person      Changes made to PTA medication list:  Added: Trazodone, Escitalopram, Tolterodine, Furosemide, Artificial tears.  Deleted: None  Changed: None    Allergies reviewed with patient and updates made in EHR: no    Medication History Completed By: Tracy Knott RPH 2/12/2025 6:40 PM    PTA Med List   Medication Sig Last Dose/Taking    acetaminophen (TYLENOL) 325 MG tablet Take 2 tablets (650 mg) by mouth every 4 hours as needed for mild pain or other (and adjunct with moderate or severe pain or per patient request). Taking As Needed    donepezil (ARICEPT) 5 MG tablet Take 1 tablet (5 mg) by mouth every evening. 2/11/2025 Evening    escitalopram (LEXAPRO) 5 MG tablet Take 5 mg by mouth at bedtime. 2/11/2025 Bedtime    famciclovir (FAMVIR) 500 MG tablet Take 1 tablet (500 mg) by mouth 2 times daily. 2/12/2025 Morning    famotidine (PEPCID) 20 MG tablet Take 1 tablet (20 mg) by mouth 2 times daily. 2/12/2025 Morning    fluorometholone (FLAREX) 0.1 % ophthalmic suspension Place 1 drop into the right eye every morning. 2/12/2025 Morning    furosemide (LASIX) 20 MG tablet Take 10 mg by mouth every morning. 2/12/2025 Morning    losartan (COZAAR) 25 MG tablet Take 1 tablet (25 mg) by mouth daily. 2/12/2025 Morning    metoprolol succinate ER (TOPROL XL) 25 MG 24 hr tablet Take 1 tablet (25 mg) by mouth daily. 2/12/2025 Morning    polyethylene glycol-propylene glycol (SYSTANE ULTRA) 0.4-0.3 % SOLN ophthalmic solution Place 1 drop Into the left eye every morning. 2/12/2025 Morning    polyethylene glycol-propylene glycol (SYSTANE ULTRA) 0.4-0.3 % SOLN ophthalmic solution Place 1 drop Into the left eye daily as needed for dry eyes. Taking As  Needed    rosuvastatin (CRESTOR) 10 MG tablet Take 1 tablet (10 mg) by mouth daily. 2/11/2025 Bedtime    tamsulosin (FLOMAX) 0.4 MG capsule Take 1 capsule (0.4 mg) by mouth every evening. 2/11/2025 Bedtime    tolterodine ER (DETROL LA) 2 MG 24 hr capsule Take 2 mg by mouth every morning. 2/12/2025 Morning    traZODone (DESYREL) 12.5 mg TABS quarter-tab Take 150 mg by mouth nightly as needed for sleep. Taking As Needed

## 2025-02-13 NOTE — ED NOTES
RN assisted pt to stand at the edge of bed with RW and use urinal. RN assisted pt in changing brief saturated with urine.

## 2025-02-13 NOTE — ED NOTES
Chux under patient changed, male purewick removed, and brief placed on patient. Pt incontinent of urine.

## 2025-02-13 NOTE — ED NOTES
[facility]  ED Nurse Handoff Report    ED Chief complaint: Melena      ED Diagnosis:   Final diagnoses:   Melena   Anemia, unspecified type   Elevated troponin       Code Status: No CPR- pre arrest intubation per order on 1/6    Allergies: No Known Allergies    Patient Story: 1 week of black stools; not on blood thinners     Focused Assessment:  89 year old male with past medical history of hypertension, hyperlipidemia, cognitive impairment, gastroesophageal reflux disease, benign prostatic hypertrophy, mood disorder, past pneumonia 1/2025, admitted 2/12/2025 for black stools, anemia, and concerns of lower gastrointestinal bleeding.     Treatments and/or interventions provided:   No orders to display      Labs Ordered and Resulted from Time of ED Arrival to Time of ED Departure   COMPREHENSIVE METABOLIC PANEL - Abnormal       Result Value    Sodium 143      Potassium 4.1      Carbon Dioxide (CO2) 28      Anion Gap 9      Urea Nitrogen 13.9      Creatinine 0.93      GFR Estimate 78      Calcium 8.6 (*)     Chloride 106      Glucose 125 (*)     Alkaline Phosphatase 57      AST 19      ALT 10      Protein Total 6.4      Albumin 3.6      Bilirubin Total 0.8     CBC WITH PLATELETS AND DIFFERENTIAL - Abnormal    WBC Count 5.9      RBC Count 3.37 (*)     Hemoglobin 10.8 (*)     Hematocrit 33.0 (*)     MCV 98      MCH 32.0      MCHC 32.7      RDW 14.6      Platelet Count 194      % Neutrophils 73      % Lymphocytes 17      % Monocytes 7      % Eosinophils 2      % Basophils 1      % Immature Granulocytes 0      NRBCs per 100 WBC 0      Absolute Neutrophils 4.3      Absolute Lymphocytes 1.0      Absolute Monocytes 0.4      Absolute Eosinophils 0.1      Absolute Basophils 0.1      Absolute Immature Granulocytes 0.0      Absolute NRBCs 0.0     TROPONIN T, HIGH SENSITIVITY - Abnormal    Troponin T, High Sensitivity 40 (*)    TROPONIN T, HIGH SENSITIVITY - Abnormal    Troponin T, High Sensitivity 39 (*)   "  OCCULT BLOOD STOOL        To be done/followed up on inpatient unit:  Follow plan of care per admitting MD    Does this patient have any cognitive concerns?: Forgetful    Activity level - Baseline/Home:  Unknown  Activity Level - Current:   Unknown    Patient's Preferred language: English   Needed?: No    Isolation: None  Infection: Not Applicable  Patient tested for COVID 19 prior to admission: NO  Bariatric?: No    Vital Signs:   Vitals:    02/12/25 1614   BP: (!) 173/77   Pulse: 71   Resp: 16   Temp: 98.5  F (36.9  C)   TempSrc: Oral   SpO2: 95%   Weight: 70.8 kg (156 lb)   Height: 1.778 m (5' 10\")       Was the PSS-3 completed:   Yes  Family Comments: Family at bedside   OBS brochure/video discussed/provided to patient/family: No    For the majority of the shift this patient's behavior was Green.   Behavioral interventions performed were Care explain.    ED NURSE PHONE NUMBER: [phone]        "

## 2025-02-13 NOTE — CONSULTS
[facility]  Gastroenterology Consultation         Riki Alvarez  [address] [address]  89 year old male    Admission Date/Time: 2/12/2025  Primary Care Provider: Ben Walker  Referring / Attending Physician:  Dr. Berny Burleson    We were asked to see the patient in consultation by Dr. Berny Burleson for evaluation of melena and anemia.      CC: black stools    HPI:  Riki Alvarez is a 89 year old male who has a PMHx of hypertension, hyperlipidemia, cognitive impairment, gastroesophageal reflux disease, benign prostatic hypertrophy, mood disorder, past pneumonia 1/2025, admitted 2/12/2025 for black stools and anemia. Patient states started 2 weeks ago with darker softer stools. Has had no pain, diarrhea or weight loss. Denies nausea, vomiting, decreased appetite, indigestion. Has never had a heartburn. Denies history of GI bleed. Has not taken any NSAIDs, pepto-bismol or iron supplements. Denies use of blood thinners.       ROS: A comprehensive ten point review of systems was negative aside from those in mentioned in the HPI.      PAST MED HX:  I have reviewed this patient's medical history and updated it with pertinent information if needed.   No past medical history on file.    MEDICATIONS:   Prior to Admission Medications   Prescriptions Last Dose Informant Patient Reported? Taking?   acetaminophen (TYLENOL) 325 MG tablet  Self No Yes   Sig: Take 2 tablets (650 mg) by mouth every 4 hours as needed for mild pain or other (and adjunct with moderate or severe pain or per patient request).   donepezil (ARICEPT) 5 MG tablet 2/11/2025 Evening Self No Yes   Sig: Take 1 tablet (5 mg) by mouth every evening.   escitalopram (LEXAPRO) 5 MG tablet 2/11/2025 Bedtime Self Yes Yes   Sig: Take 5 mg by mouth at bedtime.   famciclovir (FAMVIR) 500 MG tablet 2/12/2025 Morning Self No Yes   Sig: Take 1 tablet (500 mg) by mouth 2 times daily.   famotidine (PEPCID) 20  MG tablet 2/12/2025 Morning Self No Yes   Sig: Take 1 tablet (20 mg) by mouth 2 times daily.   fluorometholone (FLAREX) 0.1 % ophthalmic suspension 2/12/2025 Morning Self No Yes   Sig: Place 1 drop into the right eye every morning.   furosemide (LASIX) 20 MG tablet 2/12/2025 Morning Self Yes Yes   Sig: Take 10 mg by mouth every morning.   losartan (COZAAR) 25 MG tablet 2/12/2025 Morning Self No Yes   Sig: Take 1 tablet (25 mg) by mouth daily.   metoprolol succinate ER (TOPROL XL) 25 MG 24 hr tablet 2/12/2025 Morning Self No Yes   Sig: Take 1 tablet (25 mg) by mouth daily.   polyethylene glycol-propylene glycol (SYSTANE ULTRA) 0.4-0.3 % SOLN ophthalmic solution 2/12/2025 Morning Self Yes Yes   Sig: Place 1 drop Into the left eye every morning.   polyethylene glycol-propylene glycol (SYSTANE ULTRA) 0.4-0.3 % SOLN ophthalmic solution  Self Yes Yes   Sig: Place 1 drop Into the left eye daily as needed for dry eyes.   rosuvastatin (CRESTOR) 10 MG tablet 2/11/2025 Bedtime Self No Yes   Sig: Take 1 tablet (10 mg) by mouth daily.   tamsulosin (FLOMAX) 0.4 MG capsule 2/11/2025 Bedtime Self No Yes   Sig: Take 1 capsule (0.4 mg) by mouth every evening.   tolterodine ER (DETROL LA) 2 MG 24 hr capsule 2/12/2025 Morning Self Yes Yes   Sig: Take 2 mg by mouth every morning.   traZODone (DESYREL) 12.5 mg TABS quarter-tab  Self Yes Yes   Sig: Take 150 mg by mouth nightly as needed for sleep.      Facility-Administered Medications: None       ALLERGIES: No Known Allergies    SOCIAL HISTORY:       FAMILY HISTORY:  No family history on file.    PHYSICAL EXAM:   General  alert, oriented and comfortable  Vital Signs with Ranges  Temp: 98.5  F (36.9  C) Temp src: Oral BP: (!) 158/65 Pulse: 54   Resp: 12 SpO2: 97 % O2 Device: None (Room air)    No intake/output data recorded.    Constitutional: Alert, oriented to person, place, date, situation.  Cooperative, lying in bed in NAD.   Respiratory:  Lungs CTAB.  No crackles, wheezes, or  rhonchi, no labored breathing.  Cardiovascular:  Heart RRR, no MRG, no edema.  GI:  Abdomen soft, NT/ND and with normoactive BS  Skin/Integumen:  Warm, dry, non-diaphoretic.  MSK: CMS x4 intact.        ADDITIONAL COMMENTS:   I reviewed the patient's new clinical lab test results.   Recent Labs   Lab Test 25  0609 25  0148 25  1623 25  0901 25  0957 25  0625   WBC 4.6  --  5.9 7.5   < > 8.4   HGB 10.5* 10.5* 10.8* 11.6*   < > 10.5*   MCV 98  --  98 97   < > 95     --  194 402   < > 286   INR  --   --   --   --   --  1.22*    < > = values in this interval not displayed.     Recent Labs   Lab Test 25  0609 25  1623 25  0957   POTASSIUM 4.3 4.1 4.1   CHLORIDE 105 106 101   CO2 29 28 26   BUN 11.4 13.9 9.2   ANIONGAP 8 9 12     Recent Labs   Lab Test 25  1623 25  0625   ALBUMIN 3.6 2.7*   BILITOTAL 0.8 0.4   ALT 10 35   AST 19 35       I reviewed the patient's new imaging results.        CONSULTATION ASSESSMENT AND PLAN:    Riki Alvarez is a 89 year old male with past medical history of hypertension, hyperlipidemia, cognitive impairment, gastroesophageal reflux disease, benign prostatic hypertrophy, mood disorder, past pneumonia 2025, admitted 2025 for black stools, anemia, and concerns of lower gastrointestinal bleeding.     Melena  Anemia, unspecified type  No history of GI bleed, no anticoagulation   Brother  from colon CA  Last colonoscopy over 10 years ago  Hgb 10.8>10.5, baseline 12  Discussed with niece, Stefania, at request of patient. We discussed options for EGD today or watch serial hemoglobin. Both patient and niece agree to EGD today  DDx includes PUD, neoplasm, AVM vs lower GI bleed    - serial hemoglobin and transfuse for hgb < 7  - IV pantorpazole 40 mg BID  - NPO for EGD today        [facility].  Office: [phone]  Cell : [phone] (Dr. Simons)  Cell: [phone]  (Danyell Tran PA-C)

## 2025-02-13 NOTE — H&P
[facility]    History and Physical - Hospitalist Service       Date of Admission:  2025    Assessment & Plan      Riki Alvraez is a 89 year old male with past medical history of hypertension, hyperlipidemia, cognitive impairment, gastroesophageal reflux disease, benign prostatic hypertrophy, mood disorder, past pneumonia 2025, admitted 2025 for black stools, anemia, and concerns of lower gastrointestinal bleeding.    Melena  Anemia, normocytic, acute blood loss  -Hospital admission for dark stools and concerns of melena, for details see admission note.   -No prior history of gastrointestinal bleeding; on no blood thinners prior to admission; brother  of colon cancer in his 80's  -Admission hemoglobin 10.8, normal platelets and WBC; mildly elevated troponin, 40 and 39    -Admit observation, see hospital orders  -GI consult, Dr. Simons's team; assess for colonoscopy, patient undecided  -Monitor hemoglobin every 8 hours  -Type and screen  -Consider RBC transfusion if Hb<=7, symptomatic, acute bleeding and hemodynamically unstable  -No prior to admission anticoagulation reported  -IV pantoprazole  -I/O's, daily weights  -Telemetry  -Monitor vital signs (BP, HR) and for signs or symptoms of acute bleeding  -Check stool for occult blood  -Pain control, see hospital orders; acetaminophen as needed; no pain reported on admission  -Avoid non-steroidal anti-inflammatory drugs (NSAIDs) due to increased bleeding risk  -Clear liquid diet  -AM labs  Recent Labs   Lab 25  1623   HGB 10.8*     Elevated troponin  -Troponin 40, 39 on admission; possible type II demand ischemia in the setting of GI bleed and anemia; asymptomatic without cardiopulmonary symptoms on admission  -Telemetry, monitor, follow-up with primary clinic provider     Benign prostatic hypertrophy  -Continue tamsulosin (Flomax)    Gastroesophageal reflux disease  -Continue famotidine BID    Essential  hypertension  Hyperlipidemia  -Monitor blood pressure and heart rate  -Continue prior to admission losartan, metoprolol, furosemide  -Continue rosuvastatin    History of herpes simplex virus infection of eyelid  -Continue prior to admission famciclovir and eye drops once reconciled by pharmacy    History of cognitive impairment  -Continue donepezil    History of mood disorder  -Continue escitalopram    History of bereavement  History of insomnia  -Wife passed away October 2024, sympathies and condolences offered  -Continue PTA trazodone prn     Disposition  Anticipated discharge timing see Disposition Plan below and Medically Ready for Discharge link  Anticipated discharge to home vs transitional care unit   Social work consulted for discharge planning    Change in hospitalist provider tomorrow with one of my [facility] hospitalist colleagues assuming care.          Diet: Clear Liquid DietClear liquids  DVT Prophylaxis: Pneumatic Compression Devices  Garcia Catheter: Not present  Lines: None     Cardiac Monitoring: None  Code Status:  DNR, DNI code status, confirmed on admission with patient      Clinically Significant Risk Factors Present on Admission           # Hypocalcemia: Lowest Ca = 8.6 mg/dL in last 2 days, will monitor and replace as appropriate         # Hypertension: Noted on problem list     # Dementia: noted on problem list    # Anemia: based on hgb <11  # Anemia: based on hgb <11                  Disposition Plan     Medically Ready for Discharge: Anticipated in 2-4 Days           Berny Burleson MD  Hospitalist Service  [facility]  Securely message with [employer] (more info)  Text page via [employer] Paging/Directory     ______________________________________________________________________    Chief Complaint   Dark stools, melena, anemia, possible lower gastrointestinal bleeding    History is obtained from the patient, ER provider, chart review, patient's niece at the  "bedside    History of Present Illness   Riki Alvarez is a 89 year old male presenting with dark stools intermittently over the past 2 weeks.  When asked how he is feeling he states \"I feel all right\".  2-week history of dark stools denying bright red blood per rectum.  No history of peptic ulcer disease or recent endoscopy.  Last colonoscopy over 10 years ago per patient report.  Brother  of colon cancer in his 80s.  Denies abdominal pain.  No nausea or vomiting.  Several pounds of weight loss in recent history, appetite stable.  Denies chest pain or shortness of breath.  Former smoker, quit .  No current alcohol use.  Recent medical history remarkable for hospitalization for pneumonia 2024 through 2025, see dismissal summary.    In the emergency room, afebrile with mildly elevated blood pressure.  Admission hemoglobin 10.8, previously 11.6 on 2025.  Mildly elevated troponin of 40 and 39, respectively.  Satisfactory basic profile.  Glucose 125.  Hospital admission for dark stools, concerns of melena, and possible lower GI bleed.    Past Medical History    No past medical history on file.    Past Surgical History   No past surgical history on file.    Prior to Admission Medications   Prior to Admission Medications   Prescriptions Last Dose Informant Patient Reported? Taking?   acetaminophen (TYLENOL) 325 MG tablet  Self No Yes   Sig: Take 2 tablets (650 mg) by mouth every 4 hours as needed for mild pain or other (and adjunct with moderate or severe pain or per patient request).   donepezil (ARICEPT) 5 MG tablet 2025 Evening Self No Yes   Sig: Take 1 tablet (5 mg) by mouth every evening.   escitalopram (LEXAPRO) 5 MG tablet 2025 Bedtime Self Yes Yes   Sig: Take 5 mg by mouth at bedtime.   famciclovir (FAMVIR) 500 MG tablet 2025 Morning Self No Yes   Sig: Take 1 tablet (500 mg) by mouth 2 times daily.   famotidine (PEPCID) 20 MG tablet 2025 Morning Self No Yes   Sig: " Take 1 tablet (20 mg) by mouth 2 times daily.   fluorometholone (FLAREX) 0.1 % ophthalmic suspension 2025 Morning Self No Yes   Sig: Place 1 drop into the right eye every morning.   furosemide (LASIX) 20 MG tablet 2025 Morning Self Yes Yes   Sig: Take 10 mg by mouth every morning.   losartan (COZAAR) 25 MG tablet 2025 Morning Self No Yes   Sig: Take 1 tablet (25 mg) by mouth daily.   metoprolol succinate ER (TOPROL XL) 25 MG 24 hr tablet 2025 Morning Self No Yes   Sig: Take 1 tablet (25 mg) by mouth daily.   polyethylene glycol-propylene glycol (SYSTANE ULTRA) 0.4-0.3 % SOLN ophthalmic solution 2025 Morning Self Yes Yes   Sig: Place 1 drop Into the left eye every morning.   polyethylene glycol-propylene glycol (SYSTANE ULTRA) 0.4-0.3 % SOLN ophthalmic solution  Self Yes Yes   Sig: Place 1 drop Into the left eye daily as needed for dry eyes.   rosuvastatin (CRESTOR) 10 MG tablet 2025 Bedtime Self No Yes   Sig: Take 1 tablet (10 mg) by mouth daily.   tamsulosin (FLOMAX) 0.4 MG capsule 2025 Bedtime Self No Yes   Sig: Take 1 capsule (0.4 mg) by mouth every evening.   tolterodine ER (DETROL LA) 2 MG 24 hr capsule 2025 Morning Self Yes Yes   Sig: Take 2 mg by mouth every morning.   traZODone (DESYREL) 12.5 mg TABS quarter-tab  Self Yes Yes   Sig: Take 150 mg by mouth nightly as needed for sleep.      Facility-Administered Medications: None        Review of Systems    Review of systems otherwise negative and per HPI above    Social History   I have reviewed this patient's social history and updated it with pertinent information if needed.         Family History     Brother  of colon cancer in his 80s    Allergies   No Known Allergies     Physical Exam   Vital Signs: Temp: 98.5  F (36.9  C) Temp src: Oral BP: (!) 173/77 Pulse: 71   Resp: 16 SpO2: 95 % O2 Device: None (Room air)    Weight: 156 lbs 0 oz    GENERAL awake and alert, lying in bed, no acute distress, pleasant and  interactive   EYES pupils equal, round; no conjunctival injection or jaundice  ENT no obvious lesions or exudates  LYMPH no cervical, submandibular, supraclavicular, or axillary adenopathy  LUNGS no wheezes or crackles  HEART S1,S2 with RRR, no rubs or gallops  ABDOMEN soft, non-tender; no guarding, rebound, or rigidity  MUSCULOSKELETAL no gross joint deformities; with 2+ pedal edema  SKIN warm and dry  NEURO moves upper and lower extremities spontaneously and to command; no focal weakness appreciated; sensation intact to light touch upper and lower extremities  PSYCHIATRIC awake and alert; answers questions and follows simple commands  RECTAL done by ER provider on admission    Medical Decision Making             Data     I have personally reviewed the following data over the past 24 hrs:    5.9  \   10.8 (L)   / 194     143 106 13.9 /  125 (H)   4.1 28 0.93 \     ALT: 10 AST: 19 AP: 57 TBILI: 0.8   ALB: 3.6 TOT PROTEIN: 6.4 LIPASE: N/A     Trop: 39 (H) BNP: N/A       Imaging results reviewed over the past 24 hrs:   No results found for this or any previous visit (from the past 24 hours).

## 2025-02-13 NOTE — PROGRESS NOTES
[facility]    Medicine Progress Note - Hospitalist Service    Date of Admission:  2025    Assessment & Plan    Riki Alvarez is a 89 year old male with past medical history of hypertension, hyperlipidemia, cognitive impairment, gastroesophageal reflux disease, benign prostatic hypertrophy, mood disorder, past pneumonia 2025, admitted 2025 for black stools, anemia, and concerns of lower gastrointestinal bleeding.    Melena s/p EGD,   Anemia, normocytic, acute blood loss Hgb 10.8 > 10.5 > 10.5 > 11.1  Hospital admission for dark stools and concerns of melena, for details see admission note. Not on AC, no previous GIB. + FH (brother  of colon CA). HS troponin 40 >39.     - Admit observation  - GI consult - Dr. Simons's team  - EGD  without any findings to explain melenic stools; biopsies sent for H.pylori     - Plan for colonoscopy AM , orders per GI  - Transfusion if Hb<=7, symptomatic, acute bleeding and hemodynamically unstable. Hgb stable x 24 hours  - IV pantoprazole  - I/O's, daily weights   -  - poor oral intake, decreased urine output > start supportive IVF x 18 hour, hold lasix  - Telemetry  - Check stool for occult blood  - Avoid non-steroidal anti-inflammatory drugs (NSAIDs) due to increased bleeding risk  - Clear liquid diet    Elevated troponin, Chronic  -Troponin 40, 39 on admission; possible type II demand ischemia in the setting of GI bleed and anemia; asymptomatic without cardiopulmonary symptoms on admission  -Telemetry, monitor, follow-up with primary clinic provider     Benign prostatic hypertrophy  -Continue tamsulosin (Flomax)    Gastroesophageal reflux disease  -Continue famotidine BID    Essential hypertension  Hyperlipidemia  Monitor blood pressure and heart rate.  -Continue prior to admission losartan, metoprolol, furosemide  -Continue rosuvastatin    History of herpes simplex virus infection of eyelid  -Continue prior to  admission famciclovir and eye drops once reconciled by pharmacy    History of cognitive impairment  -Continue donepezil    History of mood disorder  -Continue escitalopram    History of bereavement  History of insomnia  -Wife passed away October 2024   -Continue PTA trazodone prn     Disposition  Anticipated discharge timing see Disposition Plan below and Medically Ready for Discharge link  Anticipated discharge to home vs transitional care unit   Social work consulted for discharge planning        Diet: Clear Liquid Diet    DVT Prophylaxis: Pneumatic Compression Devices  Garcia Catheter: Not present  Lines: None     Cardiac Monitoring: None  Code Status: No CPR- Do NOT Intubate      Clinically Significant Risk Factors Present on Admission           # Hypocalcemia: Lowest Ca = 8.5 mg/dL in last 2 days, will monitor and replace as appropriate         # Hypertension: Noted on problem list     # Dementia: noted on problem list  # Anemia: based on hgb <11  # Anemia: based on hgb <11                  Social Drivers of Health    Depression: At risk (11/19/2024)    [facility]    PHQ-2     PHQ-2 TOTAL SCORE: 3   Tobacco Use: Medium Risk (2/3/2025)    Received from [facility]    Patient History     Smoking Tobacco Use: Former     Smokeless Tobacco Use: Never    Received from [facility]    Social Connections          Disposition Plan     Medically Ready for Discharge: Anticipated Tomorrow           The patient's care was discussed with the Attending Physician, Dr. Shelton, Bedside Nurse, Patient, Patient's Family, and [facility] [place] Consultant(s).    [facility]  Hospitalist Service  [facility]  Securely message with [employer] (more info)  Text page via [employer] Paging/Directory   ______________________________________________________________________    Interval History   Feels well,  somewhat sedated after EGD. Calm, conversant, denies pain. Has not had a stool today, endorses low urine output.     Physical Exam   Vital Signs: Temp: 98.5  F (36.9  C) Temp src: Oral BP: (!) 158/65 Pulse: 54   Resp: 12 SpO2: 97 % O2 Device: None (Room air)    Weight: 156 lbs 0 oz    Physical Exam  Vitals and nursing note reviewed.   Constitutional:       Appearance: He is not ill-appearing.   HENT:      Mouth/Throat:      Mouth: Mucous membranes are moist.   Eyes:      Pupils: Pupils are equal, round, and reactive to light.   Cardiovascular:      Rate and Rhythm: Normal rate.      Heart sounds: Normal heart sounds.   Pulmonary:      Effort: Pulmonary effort is normal.      Breath sounds: Normal breath sounds.   Abdominal:      General: Abdomen is flat. There is no distension.      Palpations: Abdomen is soft.      Tenderness: There is no abdominal tenderness.   Skin:     General: Skin is warm and dry.   Neurological:      General: No focal deficit present.      Mental Status: He is alert and oriented to person, place, and time.   Psychiatric:         Mood and Affect: Mood normal.         Behavior: Behavior normal.       Medical Decision Making       58 MINUTES SPENT BY ME on the date of service doing chart review, history, exam, documentation & further activities per the note.      Data     I have personally reviewed the following data over the past 24 hrs:    4.6  \   11.1 (L)   / 157     142 105 11.4 /  91   4.3 29 0.85 \     ALT: 10 AST: 19 AP: 57 TBILI: 0.8   ALB: 3.6 TOT PROTEIN: 6.4 LIPASE: N/A     Trop: 39 (H) BNP: N/A       Imaging results reviewed over the past 24 hrs:   No results found for this or any previous visit (from the past 24 hours).

## 2025-02-14 LAB — COLONOSCOPY: NORMAL

## 2025-02-14 PROCEDURE — G0500 MOD SEDAT ENDO SERVICE >5YRS: HCPCS | Performed by: INTERNAL MEDICINE

## 2025-02-14 PROCEDURE — 250N000011 HC RX IP 250 OP 636: Performed by: INTERNAL MEDICINE

## 2025-02-14 PROCEDURE — 96376 TX/PRO/DX INJ SAME DRUG ADON: CPT | Mod: 59

## 2025-02-14 PROCEDURE — 99232 SBSQ HOSP IP/OBS MODERATE 35: CPT | Performed by: NURSE PRACTITIONER

## 2025-02-14 PROCEDURE — 45378 DIAGNOSTIC COLONOSCOPY: CPT | Performed by: INTERNAL MEDICINE

## 2025-02-14 PROCEDURE — 250N000013 HC RX MED GY IP 250 OP 250 PS 637: Performed by: NURSE PRACTITIONER

## 2025-02-14 PROCEDURE — 250N000013 HC RX MED GY IP 250 OP 250 PS 637: Performed by: HOSPITALIST

## 2025-02-14 PROCEDURE — 96361 HYDRATE IV INFUSION ADD-ON: CPT | Mod: 59

## 2025-02-14 PROCEDURE — 250N000013 HC RX MED GY IP 250 OP 250 PS 637: Performed by: EMERGENCY MEDICINE

## 2025-02-14 PROCEDURE — G0378 HOSPITAL OBSERVATION PER HR: HCPCS

## 2025-02-14 PROCEDURE — 250N000009 HC RX 250: Performed by: HOSPITALIST

## 2025-02-14 RX ORDER — FENTANYL CITRATE 50 UG/ML
INJECTION, SOLUTION INTRAMUSCULAR; INTRAVENOUS PRN
Status: DISCONTINUED | OUTPATIENT
Start: 2025-02-14 | End: 2025-02-14 | Stop reason: HOSPADM

## 2025-02-14 RX ORDER — LIDOCAINE 40 MG/G
CREAM TOPICAL
Status: DISCONTINUED | OUTPATIENT
Start: 2025-02-14 | End: 2025-02-14 | Stop reason: HOSPADM

## 2025-02-14 RX ORDER — ONDANSETRON 2 MG/ML
4 INJECTION INTRAMUSCULAR; INTRAVENOUS
Status: DISCONTINUED | OUTPATIENT
Start: 2025-02-14 | End: 2025-02-14

## 2025-02-14 RX ADMIN — ESCITALOPRAM OXALATE 5 MG: 5 TABLET, FILM COATED ORAL at 22:47

## 2025-02-14 RX ADMIN — ROSUVASTATIN CALCIUM 10 MG: 10 TABLET, FILM COATED ORAL at 07:58

## 2025-02-14 RX ADMIN — TAMSULOSIN HYDROCHLORIDE 0.4 MG: 0.4 CAPSULE ORAL at 20:12

## 2025-02-14 RX ADMIN — LOSARTAN POTASSIUM 25 MG: 25 TABLET, FILM COATED ORAL at 07:58

## 2025-02-14 RX ADMIN — VALACYCLOVIR HYDROCHLORIDE 1000 MG: 1 TABLET, FILM COATED ORAL at 07:58

## 2025-02-14 RX ADMIN — PANTOPRAZOLE SODIUM 40 MG: 40 INJECTION, POWDER, FOR SOLUTION INTRAVENOUS at 07:56

## 2025-02-14 RX ADMIN — METOPROLOL SUCCINATE 25 MG: 25 TABLET, EXTENDED RELEASE ORAL at 07:58

## 2025-02-14 RX ADMIN — VALACYCLOVIR HYDROCHLORIDE 1000 MG: 1 TABLET, FILM COATED ORAL at 13:20

## 2025-02-14 RX ADMIN — TOLTERODINE 2 MG: 2 CAPSULE, EXTENDED RELEASE ORAL at 07:58

## 2025-02-14 RX ADMIN — DONEPEZIL HYDROCHLORIDE 5 MG: 5 TABLET, FILM COATED ORAL at 20:12

## 2025-02-14 RX ADMIN — CARBOXYMETHYLCELLULOSE SODIUM 1 DROP: 5 SOLUTION/ DROPS OPHTHALMIC at 07:57

## 2025-02-14 ASSESSMENT — ACTIVITIES OF DAILY LIVING (ADL)
ADLS_ACUITY_SCORE: 56
ADLS_ACUITY_SCORE: 59
ADLS_ACUITY_SCORE: 56
DEPENDENT_IADLS:: CLEANING;LAUNDRY;SHOPPING;TRANSPORTATION
ADLS_ACUITY_SCORE: 59
ADLS_ACUITY_SCORE: 56
ADLS_ACUITY_SCORE: 56
ADLS_ACUITY_SCORE: 59
ADLS_ACUITY_SCORE: 56
ADLS_ACUITY_SCORE: 59
ADLS_ACUITY_SCORE: 56
ADLS_ACUITY_SCORE: 59
ADLS_ACUITY_SCORE: 56
ADLS_ACUITY_SCORE: 59
ADLS_ACUITY_SCORE: 56
ADLS_ACUITY_SCORE: 59
ADLS_ACUITY_SCORE: 56
ADLS_ACUITY_SCORE: 59
ADLS_ACUITY_SCORE: 56

## 2025-02-14 NOTE — PLAN OF CARE
Goal Outcome Evaluation:    .Top portion must ALWAYS be completed  PRIMARY Concern: Black stools   SAFETY RISK Concerns (fall risk, behaviors, etc.): Fall      Aggression Tool Color: Green   Isolation/Type: None   Tests/Procedures for NEXT shift: Possible colonoscopy in AM   Consults? (Pending/following, signed-off?) GI following   Where is patient from? (Home, TCU, etc.): Home, lives alone  Other Important info for NEXT shift: Bowel prep in progress   Anticipated DC date & active delays: TBD  _____________________________________________________________________________  SUMMARY NOTE:              (either paste note here OR complete the info below- RN to choose one- delete info below if not used)  Orientation/Cognitive: A&Ox4, forgetful at times   Observation Goals (Met/ Not Met): Not met   Mobility Level/Assist Equipment: Ax1 G/W  Antibiotics & Plan (IV/po, length of tx left): N/a  Pain Management: Denies at this time   Tele/VS/O2: VSS except HTN on RA  ABNL Lab/BG: EGD done   Diet: Clears   Bowel/Bladder: Incontinent, purewick in place  Skin Concerns: Scattered bruising, rash on R buttocks/IT, blanchable redness on sacrum   Drains/Devices: PIV infusing LR @ 75ml/hr  Patient Stated Goal for Today: Rest

## 2025-02-14 NOTE — PROGRESS NOTES
Observation goals  PRIOR TO DISCHARGE       Comments:   -diagnostic tests and consults completed and resulted- Not met   -vital signs normal or at patient baseline- Not met   -tolerating oral intake to maintain hydration- Not met   -returns to baseline functional status- Not met   -safe disposition plan has been identified- Not met   -GI bleed stable, decision regarding endoscopy made, okay with GI (consulted)- Partially met

## 2025-02-14 NOTE — PLAN OF CARE
Goal Outcome Evaluation:  PRIMARY Concern: Melena   SAFETY RISK Concerns (fall risk, behaviors, etc.): Fall      Aggression Tool Color: Green   Isolation/Type: None   Tests/Procedures for NEXT shift: Possible colonoscopy in AM   Consults? (Pending/following, signed-off?) GI following   Where is patient from? (Home, TCU, etc.): Home, lives alone  Other Important info for NEXT shift: EGD 2/13 without any findings to explain melenic stools   Anticipated DC date & active delays: TBD    SUMMARY NOTE:    Orientation/Cognitive: A&Ox4, forgetful at times   Observation Goals (Met/ Not Met): Not met   Mobility Level/Assist Equipment: Ax1 G/W  Antibiotics & Plan (IV/po, length of tx left): N/a  Pain Management: Denies   Tele/VS/O2: VSS on RA except HTN. Tele= SR    ABNL Lab/BG: Hgb=11.1  Diet: NPO  Bowel/Bladder: Incontinent, purewick in place  Skin Concerns: Scattered bruising, rash on R buttocks/IT, blanchable redness on sacrum   Drains/Devices: PIV infusing LR @ 75ml/hr  Patient Stated Goal for Today: Rest       Observation goals  PRIOR TO DISCHARGE       Comments:   -diagnostic tests and consults completed and resulted- Not met   -vital signs normal or at patient baseline- Not met   -tolerating oral intake to maintain hydration- Not met   -returns to baseline functional status- Not met   -safe disposition plan has been identified- Not met   -GI bleed stable, decision regarding endoscopy made, okay with GI (consulted)- Partially met

## 2025-02-14 NOTE — PLAN OF CARE
Observation Goals:  -diagnostic tests and consults completed and resulted MET, colonoscopy completed  -vital signs normal or at patient baseline MET  -tolerating oral intake to maintain hydration NOT MET, encouraged to order lunch  -returns to baseline functional status Not Met. Ambulating with staff at this time, unsteady at times. Patient lives alone  -safe disposition plan has been identified NOT Met  -GI bleed stable, decision regarding endoscopy made, okay with GI (consulted) MET       Shift Note 5090-4568:     Patient is alert and oriented x4, forgetful at times  Mobility: SB  Vitals/Tele: Elevated b/p, scheduled medications given,effective. Tele: SR with BBB  Aggression Stop Light: green    Shift Summary: Colonoscopy completed. IVF stopped- encouraged oral intake. PT eval ordered    Discharge Plan: Today? Patient needs to eat and ambulate prior to discharge and see PT    See Flow sheets for assessment

## 2025-02-14 NOTE — PROCEDURES
Colonoscopy:    - Preparation of the colon was poor.   - The examined portion of the ileum was normal.   - The entire examined colon is normal.   - Internal hemorrhoids.   - No specimens collected.   - Anemia most likely due to duodenal ulcer but will still recommend outpatient pill cam to ensure no small bowel bleeding.    - Discharge patient to home.   - Resume regular diet.   - Continue present medications.   - Outpatient small bowel pillcam.    Joyce Medrano MD (Richard)  Provider's Cell: [phone]     [facility] GI consultant PA  [phone]

## 2025-02-14 NOTE — PROGRESS NOTES
[facility]  Gastroenterology Progress Note     Riki Alvarez MRN# [id number]   YOB: 1934 Age: 89 year old          Assessment and Plan:   Riki Alvarez is a 89 year old male with past medical history of hypertension, hyperlipidemia, cognitive impairment, gastroesophageal reflux disease, benign prostatic hypertrophy, mood disorder, past pneumonia 2025, admitted 2025 for black stools, anemia, and concerns of lower gastrointestinal bleeding.      Melena  Anemia, unspecified type  No history of GI bleed, no anticoagulation   Brother  from colon CA  Last colonoscopy over 10 years ago  Hgb 10.8>10.5, baseline 12  DDx includes PUD, neoplasm, AVM vs lower GI bleed     - serial hemoglobin and transfuse for hgb < 7  - Pantoprazole 40 mg daily  - EGD : Multiple fundic gland polyps. Non-bleeding duodenal ulcers with no stigmata of bleeding.   - Colonoscopy today.  Bowel prep completed, pt in agreement.    - NPO, can eat after test    Recent Labs   Lab 25  1502 25  0609 25  0148 25  1623   HGB 11.1* 10.5* 10.5* 10.8*           Interval History:     doing well; no cp, sob, n/v/d, or abd pain.              Review of Systems:     C: NEGATIVE for fever, chills, change in weight  E/M: NEGATIVE for ear, mouth and throat problems  R: NEGATIVE for significant cough or SOB  CV: NEGATIVE for chest pain, palpitations or peripheral edema             Medications:   I have reviewed this patient's current medications  Current Facility-Administered Medications   Medication Dose Route Frequency Provider Last Rate Last Admin    carboxymethylcellulose PF (REFRESH PLUS) 0.5 % ophthalmic solution 1 drop  1 drop Left Eye QAM Berny Burleson MD   1 drop at 25 075    donepezil (ARICEPT) tablet 5 mg  5 mg Oral QPM Berny Burleson MD   5 mg at 25    escitalopram (LEXAPRO) tablet 5 mg  5 mg Oral At Bedtime Berny Burleson MD   5 mg  at 02/13/25 2208    fluorometholone (FML LIQUIFILM) 0.1 % ophthalmic susp 1 drop  1 drop Right Eye QAM Trigger, Ez Christensen MD   1 drop at 02/13/25 0842    [Held by provider] furosemide (LASIX) half-tab 10 mg  10 mg Oral QAM Berny Burleson MD   10 mg at 02/13/25 0843    losartan (COZAAR) tablet 25 mg  25 mg Oral Daily Danyell Eric APRN CNP   25 mg at 02/14/25 0758    metoprolol succinate ER (TOPROL XL) 24 hr tablet 25 mg  25 mg Oral Daily Berny Burleson MD   25 mg at 02/14/25 0758    pantoprazole (PROTONIX) IV push injection 40 mg  40 mg Intravenous Daily with breakfast Berny Burleson MD   40 mg at 02/14/25 0756    rosuvastatin (CRESTOR) tablet 10 mg  10 mg Oral Daily Berny Burleson MD   10 mg at 02/14/25 0758    sodium chloride (PF) 0.9% PF flush 3 mL  3 mL Intracatheter Q8H Fidel Simons MD        tamsulosin (FLOMAX) capsule 0.4 mg  0.4 mg Oral QPM Berny Burleson MD   0.4 mg at 02/13/25 2014    tolterodine ER (DETROL LA) 24 hr capsule 2 mg  2 mg Oral QAM Berny Burleson MD   2 mg at 02/14/25 0758    valACYclovir (VALTREX) tablet 1,000 mg  1,000 mg Oral TID Ez Nugent MD   1,000 mg at 02/14/25 0758                  Physical Exam:   Vitals were reviewed  Vital Signs with Ranges  Temp:  [98  F (36.7  C)-98.5  F (36.9  C)] 98.1  F (36.7  C)  Pulse:  [50-82] 69  Resp:  [8-16] 16  BP: (118-210)/() 182/77  SpO2:  [94 %-98 %] 95 %  I/O last 3 completed shifts:  In: -   Out: 1500 [Urine:1500]  Constitutional: Alert, oriented.  Cooperative, lying in bed in NAD.   Respiratory:  Lungs CTAB, no labored breathing.  Cardiovascular:  Heart RRR, no edema.  GI:  Abdomen soft, NT/ND   Skin/Integumen:  Pale, dry, non-diaphoretic.  MSK: CMS x4 grossly intact.             Data:   I reviewed the patient's new clinical lab test results.   Recent Labs   Lab Test 02/13/25  1502 02/13/25  0609 02/13/25  0148 02/12/25  1623 01/04/25  0901 01/02/25  0957 01/01/25  0625    WBC  --  4.6  --  5.9 7.5   < > 8.4   HGB 11.1* 10.5* 10.5* 10.8* 11.6*   < > 10.5*   MCV  --  98  --  98 97   < > 95   PLT  --  157  --  194 402   < > 286   INR  --   --   --   --   --   --  1.22*    < > = values in this interval not displayed.     Recent Labs   Lab Test 02/13/25  0609 02/12/25  1623 01/02/25  0957   POTASSIUM 4.3 4.1 4.1   CHLORIDE 105 106 101   CO2 29 28 26   BUN 11.4 13.9 9.2   ANIONGAP 8 9 12     Recent Labs   Lab Test 02/12/25  1623 01/01/25  0625   ALBUMIN 3.6 2.7*   BILITOTAL 0.8 0.4   ALT 10 35   AST 19 35       I reviewed the patient's new imaging results.    All laboratory data reviewed  All imaging studies reviewed by me.    HUBERT Maurice,  2/14/2025  [facility]  Office : [phone]  Cell: [phone] (Dr. Simons)
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
## 2025-02-14 NOTE — PROGRESS NOTES
Observation goals  PRIOR TO DISCHARGE       Comments:   -diagnostic tests and consults completed and resulted- Not met   -vital signs normal or at patient baseline- Not met   -tolerating oral intake to maintain hydration- Not met   -returns to baseline functional status- Not met   -safe disposition plan has been identified- Not met   -GI bleed stable, decision regarding endoscopy made, okay with GI (consulted)- Partially met          Yes

## 2025-02-14 NOTE — CONSULTS
Care Management Initial Consult    General Information  Assessment completed with: Patient, VM-chart review,    Type of CM/SW Visit: Initial Assessment    Primary Care Provider verified and updated as needed: Yes (Dr. Aiken, [facility])   Readmission within the last 30 days: no previous admission in last 30 days      Reason for Consult: discharge planning  Advance Care Planning: Advance Care Planning Reviewed: no concerns identified          Communication Assessment  Patient's communication style: spoken language (English or Bilingual)             Cognitive  Cognitive/Neuro/Behavioral: .WDL except  Level of Consciousness: alert  Arousal Level: opens eyes spontaneously  Orientation: oriented x 4  Mood/Behavior: calm, cooperative  Best Language: 0 - No aphasia  Speech: clear    Living Environment:   People in home: alone     Current living Arrangements: house      Able to return to prior arrangements: yes  Living Arrangement Comments: house has a few steps to enter; 2 story with stair lift to second floor    Family/Social Support:  Care provided by: self, other (see comments) (2 nieces are very supportive)  Provides care for: no one  Marital Status:   Support system: Other (specify) (2 nieces Stefania and Marisol)          Description of Support System:           Current Resources:   Patient receiving home care services: Yes  Skilled Home Care Services: Skilled Nursing, Physical Therapy, Occupational Therapy     Community Resources:    Equipment currently used at home: cane, straight, walker, rolling, shower chair, other (see comments) (stair lift)  Supplies currently used at home:      Employment/Financial:  Employment Status: retired        Financial Concerns: none           Does the patient's insurance plan have a 3 day qualifying hospital stay waiver?  No    Lifestyle & Psychosocial Needs:  Social Drivers of Health     Food Insecurity: Low Risk  (1/5/2025)    Food Insecurity     Within the past 12 months,  did you worry that your food would run out before you got money to buy more?: No     Within the past 12 months, did the food you bought just not last and you didn t have money to get more?: No   Depression: At risk (11/19/2024)    [facility]    PHQ-2     PHQ-2 TOTAL SCORE: 3   Housing Stability: Low Risk  (1/5/2025)    Housing Stability     Do you have housing? : Yes     Are you worried about losing your housing?: No   Tobacco Use: Low Risk  (2/14/2025)    Patient History     Smoking Tobacco Use: Never     Smokeless Tobacco Use: Never     Passive Exposure: Not on file   Recent Concern: Tobacco Use - Medium Risk (2/3/2025)    [facility]    Patient History     Smoking Tobacco Use: Former     Smokeless Tobacco Use: Never     Passive Exposure: Not on file   Financial Resource Strain: Low Risk  (1/5/2025)    Financial Resource Strain     Within the past 12 months, have you or your family members you live with been unable to get utilities (heat, electricity) when it was really needed?: No   Alcohol Use: Not on file   Transportation Needs: Low Risk  (1/5/2025)    Transportation Needs     Within the past 12 months, has lack of transportation kept you from medical appointments, getting your medicines, non-medical meetings or appointments, work, or from getting things that you need?: No   Physical Activity: Not on file   Interpersonal Safety: Low Risk  (2/14/2025)    Interpersonal Safety     Do you feel physically and emotionally safe where you currently live?: Yes     Within the past 12 months, have you been hit, slapped, kicked or otherwise physically hurt by someone?: No     Within the past 12 months, have you been humiliated or emotionally abused in other ways by your partner or ex-partner?: No   Stress: Not on file   Social Connections: Unknown (7/1/2023)    [email or web address]    Social  "Connections     Frequency of Communication with Friends and Family: Not on file   Health Literacy: Not on file       Functional Status:  Prior to admission patient needed assistance:   Dependent ADLs:: Independent, Ambulation-walker  Dependent IADLs:: Cleaning, Laundry, Shopping, Transportation       Mental Health Status:          Chemical Dependency Status:                Values/Beliefs:  Spiritual, Cultural Beliefs, Zoroastrianism Practices, Values that affect care: no               Discussed  Partnership in Safe Discharge Planning  document with patient/family: No    Additional Information:  Met with patient at bedside; introduced self and explained role in discharge planning.  Also provided patient with the Medicare Observation Notice.    Patient admitted with melena.  He has had an EGD and Colonoscopy.      Patient Riki states he is .  He was discharged form, [facility] a couple weeks ago and has had home care with [facility].  They have been notified he is here but no one available to talk with so they will return call; need to verify services.    Patient lives independently in his own home.  He has support from his 2 nieces Stefania and Marisol.  Stefania will be coming in today.  Riki states Stefania assists with laundry and Marisol with shopping.  Patient does his own meal prep.  He has a tub shower and shower chair but does not use because he doesn't want to step over the tub.  He has a few steps to enter the home and a stair lift to the second level but only to the first landing then takes the few steps with handrails on both sides to the top.    He feels he is at his level prior to admission as far as ambulation and does not think he needs a TCU again.    The Hospitalist arrived and discussed potential discharge with the patient today pending his tolerating a diet and up ambulating independently.        Next Steps: Readiness to discharge; \"resume\" home care orders - verify services he is open to; add " HHA if not open as he is not showering on his own.      Addendum at 2pm:  [facility] confirmed he is open for RN/PT/OT.  Can add HHA.      Tayler Delgado RN  Inpatient Float Care Coordinator  [facility]  [email or web address]

## 2025-02-15 ENCOUNTER — APPOINTMENT (OUTPATIENT)
Dept: PHYSICAL THERAPY | Facility: CLINIC | Age: OVER 89
End: 2025-02-15
Attending: NURSE PRACTITIONER
Payer: MEDICARE

## 2025-02-15 VITALS
WEIGHT: 141.1 LBS | HEIGHT: 70 IN | HEART RATE: 64 BPM | RESPIRATION RATE: 16 BRPM | SYSTOLIC BLOOD PRESSURE: 131 MMHG | DIASTOLIC BLOOD PRESSURE: 54 MMHG | TEMPERATURE: 98.4 F | BODY MASS INDEX: 20.2 KG/M2 | OXYGEN SATURATION: 97 %

## 2025-02-15 PROCEDURE — 97161 PT EVAL LOW COMPLEX 20 MIN: CPT | Mod: GP

## 2025-02-15 PROCEDURE — 97116 GAIT TRAINING THERAPY: CPT | Mod: GP

## 2025-02-15 PROCEDURE — 250N000013 HC RX MED GY IP 250 OP 250 PS 637: Performed by: NURSE PRACTITIONER

## 2025-02-15 PROCEDURE — 97530 THERAPEUTIC ACTIVITIES: CPT | Mod: GP

## 2025-02-15 PROCEDURE — 250N000013 HC RX MED GY IP 250 OP 250 PS 637: Performed by: EMERGENCY MEDICINE

## 2025-02-15 PROCEDURE — 99239 HOSP IP/OBS DSCHRG MGMT >30: CPT | Performed by: HOSPITALIST

## 2025-02-15 PROCEDURE — 250N000013 HC RX MED GY IP 250 OP 250 PS 637: Performed by: HOSPITALIST

## 2025-02-15 PROCEDURE — G0378 HOSPITAL OBSERVATION PER HR: HCPCS

## 2025-02-15 PROCEDURE — 250N000009 HC RX 250: Performed by: HOSPITALIST

## 2025-02-15 PROCEDURE — 96376 TX/PRO/DX INJ SAME DRUG ADON: CPT

## 2025-02-15 RX ORDER — PANTOPRAZOLE SODIUM 40 MG/1
40 TABLET, DELAYED RELEASE ORAL DAILY
Qty: 30 TABLET | Refills: 0 | Status: SHIPPED | OUTPATIENT
Start: 2025-02-15

## 2025-02-15 RX ADMIN — METOPROLOL SUCCINATE 25 MG: 25 TABLET, EXTENDED RELEASE ORAL at 08:05

## 2025-02-15 RX ADMIN — PANTOPRAZOLE SODIUM 40 MG: 40 INJECTION, POWDER, FOR SOLUTION INTRAVENOUS at 08:04

## 2025-02-15 RX ADMIN — FLUOROMETHOLONE 1 DROP: 1 SUSPENSION/ DROPS OPHTHALMIC at 08:05

## 2025-02-15 RX ADMIN — LOSARTAN POTASSIUM 25 MG: 25 TABLET, FILM COATED ORAL at 08:05

## 2025-02-15 RX ADMIN — ROSUVASTATIN CALCIUM 10 MG: 10 TABLET, FILM COATED ORAL at 08:05

## 2025-02-15 RX ADMIN — TOLTERODINE 2 MG: 2 CAPSULE, EXTENDED RELEASE ORAL at 08:05

## 2025-02-15 RX ADMIN — CARBOXYMETHYLCELLULOSE SODIUM 1 DROP: 5 SOLUTION/ DROPS OPHTHALMIC at 08:05

## 2025-02-15 RX ADMIN — VALACYCLOVIR HYDROCHLORIDE 1000 MG: 1 TABLET, FILM COATED ORAL at 08:05

## 2025-02-15 ASSESSMENT — ACTIVITIES OF DAILY LIVING (ADL)
ADLS_ACUITY_SCORE: 61
ADLS_ACUITY_SCORE: 59
ADLS_ACUITY_SCORE: 61
ADLS_ACUITY_SCORE: 59
ADLS_ACUITY_SCORE: 61
ADLS_ACUITY_SCORE: 61
ADLS_ACUITY_SCORE: 59
ADLS_ACUITY_SCORE: 61

## 2025-02-15 NOTE — PROGRESS NOTES
Observation Goals    -diagnostic tests and consults completed and resulted: Met  -vital signs normal or at patient baseline: Not met  -tolerating oral intake to maintain hydration: mMet  -returns to baseline functional status: Met, AX1 with GB and walker  -safe disposition plan has been identified: Not met  -GI bleed stable, decision regarding endoscopy made, okay with GI (consulted): Met

## 2025-02-15 NOTE — PROGRESS NOTES
Care Management Discharge Note    Discharge Date: 02/15/2025       Discharge Disposition:  Home    Discharge Services:  Resume Home Care    Discharge DME:      Discharge Transportation: family or friend will provide    Private pay costs discussed: Not applicable    Does the patient's insurance plan have a 3 day qualifying hospital stay waiver?  No    PAS Confirmation Code:    Patient/family educated on Medicare website which has current facility and service quality ratings:      Education Provided on the Discharge Plan:    Persons Notified of Discharge Plans:   Patient/Family in Agreement with the Plan: yes    Handoff Referral Completed: No, handoff not indicated or clinically appropriate    Additional Information:  Patient discharging today.    Patient is open with [facility] for PT/OT/RN; they are willing to add HHA per writers conversation with intake yesterday; they were also advised of his pending discharge.  Discharge home care orders were sent to [facility]  via [facility].      Tayler Delgado RN  Inpatient Float Care Coordinator  [facility]  [email or web address]

## 2025-02-15 NOTE — PROGRESS NOTES
Observation Goals    -diagnostic tests and consults completed and resulted: Met  -vital signs normal or at patient baseline: Met  -tolerating oral intake to maintain hydration: Met  -returns to baseline functional status: Met, AX1 with GB and walker  -safe disposition plan has been identified: Not met  -GI bleed stable, decision regarding endoscopy made, okay with GI (consulted): Met

## 2025-02-15 NOTE — PROGRESS NOTES
diagnostic tests and consults completed and resulted met  -vital signs normal or at patient baseline not met, BP high, not meeting parameters  -tolerating oral intake to maintain hydration met  -returns to baseline functional status met, AX1 with GB and walker  -safe disposition plan has been identified not met  -GI bleed stable, decision regarding endoscopy made, okay with GI (consulted) met

## 2025-02-15 NOTE — PROGRESS NOTES
[facility]      OUTPATIENT PHYSICAL THERAPY EVALUATION  PLAN OF TREATMENT FOR OUTPATIENT REHABILITATION  (COMPLETE FOR INITIAL CLAIMS ONLY)  Patient's Last Name, First Name, M.I.  YOB: 1934  Riki Alvarez                        Provider's Name  [facility] Medical Record No.  [id number]                               Onset Date:  02/12/25   Start of Care Date:  02/12/25      Type:     _X_PT   ___OT   ___SLP Medical Diagnosis:  Melena                        PT Diagnosis:  Impaired mobility   Visits from SOC:  1   _________________________________________________________________________________  Plan of Treatment/Functional Goals    Planned Interventions: balance training, bed mobility training, gait training, home exercise program, patient/family education, stair training, strengthening, transfer training     Goals: See Physical Therapy Goals on Care Plan in [employer] electronic health record.    Therapy Frequency: 3x/week  Predicted Duration of Therapy Intervention: 02/22/25  _________________________________________________________________________________    I CERTIFY THE NEED FOR THESE SERVICES FURNISHED UNDER        THIS PLAN OF TREATMENT AND WHILE UNDER MY CARE     (Physician attestation of this document indicates review and certification of the therapy plan).              Certification date from: 02/15/25, Certification date to: 02/28/25    Referring Physician: Danyell Eric APRN CNP            Initial Assessment        See Physical Therapy evaluation dated 02/12/25 in Epic electronic health record.     02/15/25 1006   Appointment Info   Signing Clinician's Name / Credentials (PT) Airam Reardon, PT, DPT   Quick Adds   Quick Adds Certification   Living Environment   People in Home alone   Current Living Arrangements house   Home Accessibility stairs  to enter home;stairs within home   Number of Stairs, Main Entrance 4   Stair Railings, Main Entrance railings safe and in good condition;railings on both sides of stairs   Number of Stairs, Within Home, Primary greater than 10 stairs   Stair Railings, Within Home, Primary railings safe and in good condition   Transportation Anticipated family or friend will provide   Living Environment Comments Pt has a stair lift inside home but does need to negotiate 4 stairs   Self-Care   Usual Activity Tolerance moderate   Current Activity Tolerance fair   Regular Exercise No   Equipment Currently Used at Home walker, rolling   Fall history within last six months no   Activity/Exercise/Self-Care Comment At baseline pt is independent, ambulates with a 4WW, has 2 nieces that stop in daily and assist with shopping, meals, and housekeeping.   General Information   Onset of Illness/Injury or Date of Surgery 02/12/25   Referring Physician Danyell Eric APRN CNP   Patient/Family Therapy Goals Statement (PT) To go home   Pertinent History of Current Problem (include personal factors and/or comorbidities that impact the POC) Pt is 89 year old male adm on 2/12/25 for evaluation of melena x 1 week. Pt seen by GI and underwent EGD and colonoscopy which did not show any active bleeding, anemia thought to be related to duodenal ulcer but unclear, outpatient follow up planned. Pt was to be discharged after colonoscopy but did not do well ambulating with nursing so was kept in hospital overnight. PMH includes HTN, hyperlipidemia, GERD, BPH, mood disorder, recent pneumonia.   Existing Precautions/Restrictions fall   Cognition   Affect/Mental Status (Cognition) WFL   Orientation Status (Cognition) oriented x 4   Follows Commands (Cognition) WFL   Pain Assessment   Patient Currently in Pain No   Integumentary/Edema   Integumentary/Edema no deficits were identifed   Posture    Posture Forward head position   Range of Motion (ROM)   Range of  Motion ROM is WFL   Strength (Manual Muscle Testing)   Strength (Manual Muscle Testing) Deficits observed during functional mobility   Strength Comments General deconditioning, no focal weakness   Bed Mobility   Comment, (Bed Mobility) Pt OOB in chair with nursing prior to session, denies difficulty with bed mobility   Transfers   Comment, (Transfers) SBA   Gait/Stairs (Locomotion)   Comment, (Gait/Stairs) SBA with FWW   Balance   Balance Comments No overt LOB, slightly unsteady initially but improve throughout session   Clinical Impression   Criteria for Skilled Therapeutic Intervention Yes, treatment indicated   PT Diagnosis (PT) Impaired mobility   Influenced by the following impairments Decreased activity tolerance, decreased strength, decreased balance   Functional limitations due to impairments Decreased ability to participate in daily tasks   Clinical Presentation (PT Evaluation Complexity) stable   Clinical Presentation Rationale Current presentation, [facility]   Clinical Decision Making (Complexity) low complexity   Planned Therapy Interventions (PT) balance training;bed mobility training;gait training;home exercise program;patient/family education;stair training;strengthening;transfer training   Risk & Benefits of therapy have been explained evaluation/treatment results reviewed;care plan/treatment goals reviewed;risks/benefits reviewed;current/potential barriers reviewed;participants voiced agreement with care plan;participants included;patient   PT Total Evaluation Time   PT Eval, Low Complexity Minutes (00614) 10   Therapy Certification   Start of care date 02/12/25   Certification date from 02/15/25   Certification date to 02/28/25   Medical Diagnosis Melena   Physical Therapy Goals   PT Frequency 3x/week   PT Predicted Duration/Target Date for Goal Attainment 02/22/25   PT Goals Bed Mobility;Transfers;Gait;Stairs   PT: Bed Mobility Independent;Supine to/from sit;Rolling   PT: Transfers Modified  independent;Sit to/from stand;Bed to/from chair;Assistive device   PT: Gait Modified independent;Rolling walker;Greater than 200 feet   PT: Stairs Modified independent;4 stairs;Rail on both sides   Interventions   Interventions Quick Adds Gait Training;Therapeutic Activity   Therapeutic Activity   Therapeutic Activities: dynamic activities to improve functional performance Minutes (20180) 10   Symptoms Noted During/After Treatment None   Treatment Detail/Skilled Intervention Pt OOB in chair on arrival, agreeable to participate, is hopeful to go home today. Pt completes sit to stand transfer from chair with SBA, slow but safe, good eccentric control with stand to sit as well. Discussed discharge planning and pt feels comfortable going home, is open to having home care services as he does feel weaker and deconditioned. At completion of session pt OOB in chair, needs within reach, fall alarm activated.   Gait Training   Gait Training Minutes (10512) 13   Symptoms Noted During/After Treatment (Gait Training) none   Treatment Detail/Skilled Intervention Pt ambulates 150'x2 with FWW and CGA initially progressing to SBA, some cues needed for navigation, no LOB, able to negotiate around obstacles. Education provided on stair technique and pt demonstrates ability to step up onto step with B UE support. Did not formally trial stairs at this time as pt's breakfast tray had arrived and he wanted to eat.   PT Discharge Planning   PT Plan If pt remains in hospital will continue to progress activity as alfonso   PT Discharge Recommendation (DC Rec) home with assist;home with home care physical therapy   PT Rationale for DC Rec Pt appears close to baseline level of function, anticipate discharge to home with continued support of nieces for higher level household tasks and home PT to further address deficits and optimize funcitonal independence and safety in home environment.   PT Brief overview of current status Goals of therapy will  be to address safe mobility and make recs for d/c to next level of care. Pt and RN will continue to follow all falls risk precautions as documented by RN staff while hospitalized   PT Total Distance Amb During Session (feet) 300   Physical Therapy Time and Intention   Timed Code Treatment Minutes 23   Total Session Time (sum of timed and untimed services) 33

## 2025-02-15 NOTE — DISCHARGE SUMMARY
[facility]  Hospitalist Discharge Summary      Date of Admission:  2025  Date of Discharge:  2/15/2025  Discharging Provider: Jennifer Shelton DO  Discharge Service: Hospitalist Service    Discharge Diagnoses   Melena s/p EGD,   Anemia, normocytic, acute blood loss   Elevated troponin, Chronic  Benign prostatic hypertrophy  Gastroesophageal reflux disease  Essential hypertension  Hyperlipidemia  History of cognitive impairment  History of herpes simplex virus infection of eyelid  History of mood disorder  History of bereavement  History of insomnia    Clinically Significant Risk Factors          Follow-ups Needed After Discharge   Follow-up Appointments       Hospital Follow-up with Existing Primary Care Provider (PCP)      Please see details below         Schedule Primary Care visit within: 7 Days       Follow Up      Follow up with Kristyn WASHBURN to discuss possible outpatient pill camera procedure.                Unresulted Labs Ordered in the Past 30 Days of this Admission       Date and Time Order Name Status Description    2025 10:00 AM Surgical Pathology Exam In process         These results will be followed up by Kristyn WASHBURN    Discharge Disposition   Discharged to home with home care PT/OT/RN  Condition at discharge: Stable    Hospital Course   Riki Alvarez is a 89 year old male with past medical history of hypertension, hyperlipidemia, cognitive impairment, gastroesophageal reflux disease, benign prostatic hypertrophy, mood disorder, past pneumonia 2025, admitted 2025 for black stools, anemia, and concerns of lower gastrointestinal bleeding.     Melena s/p EGD,   Anemia, normocytic, acute blood loss Hgb 10.8 > 10.5 > 10.5 > 11.1  Hospital admission for dark stools and concerns of melena, for details see admission note. Not on AC, no previous GIB. + FH (brother  of colon CA). HS troponin 40 >39.   - GI consult - Dr. Simons's team  - EGD  without any  findings to explain melenic stools; biopsies sent for H.pylori                - Colonoscopy 2/14 w/ normal ileum, colon normal, internal hemorrhoids.   - Anemia thought to be related to duodenal ulcer, but unclear. Plans to follow up with Kristyn WASHBURN for possible pill cam. With IV pantoprazole, Hgb remained stable. Occult stool testing negative.  - Avoid non-steroidal anti-inflammatory drugs (NSAIDs) due to increased bleeding risk  - discharge delayed on 2/14 as he was stumbling with nursing, did better with PT eval on 2/15 and stable to go home  - recommended pantoprazole 40mg daily on discharge, sent to his pharmacy at [facility] on [address] (updated his niece Marisol and patient via phone on 2/15)  - follow up with PCP     Elevated troponin, Chronic  -Troponin 40, 39 on admission; possible type II demand ischemia in the setting of GI bleed and anemia; asymptomatic without cardiopulmonary symptoms on admission  -follow-up with primary clinic provider      Benign prostatic hypertrophy  -Continue tamsulosin (Flomax)     Gastroesophageal reflux disease  -stop PTA famotidine and instead take protonix 40mg daily as above     Essential hypertension  Hyperlipidemia  Monitor blood pressure and heart rate.   -Continue prior to admission losartan, metoprolol, furosemide on discharge  -Continue rosuvastatin     History of herpes simplex virus infection of eyelid  -Continue prior to admission famciclovir and eye drops     History of cognitive impairment  -Continue donepezil     History of mood disorder  -Continue escitalopram     History of bereavement  History of insomnia  -Wife passed away October 2024   -Continue PTA trazodone prn      Disposition  SW involved, pt discharged home with family. RN/PT/OT Home care services resumed. Family will be staying with him overnight on day of discharge.     Consultations This Hospital Stay   GASTROENTEROLOGY IP CONSULT  CARE MANAGEMENT / SOCIAL WORK IP CONSULT  PHYSICAL THERAPY ADULT IP  CONSULT  PHYSICAL THERAPY ADULT IP CONSULT    Code Status   No CPR- Do NOT Intubate    Time Spent on this Encounter   I, Jennifer Shelton DO, personally saw the patient today and spent greater than 30 minutes discharging this patient.       Jennifer Shelton DO  [facility] EXTENDED RECOVERY AND SHORT STAY  [address] [facility] [address] [address]  Phone: [phone]  ______________________________________________________________________    Physical Exam   Vital Signs: Temp: 98.4  F (36.9  C) Temp src: Oral BP: 131/54 Pulse: 64   Resp: 16 SpO2: 97 % O2 Device: None (Room air)    Weight: 141 lbs 1.6 oz    Patient seen and examined. He looks well, is eager to go home. No chest pain, shortness of breath or nausea. Did well with therapy. Spoke with patient's niece Stefania and then Marisol. Stable for discharge to home with therapy.    Constitutional: Awake, alert, cooperative, no apparent distress  Respiratory: Clear to auscultation bilaterally, no crackles or wheezing  Cardiovascular: Regular rate and rhythm, normal S1 and S2, and no murmur noted  GI: Normal bowel sounds, soft, non-distended, non-tender  Skin/Integumen: No rashes, no cyanosis, no edema  Other:         Primary Care Physician   Karolyn Aiken    Discharge Orders      Home Care Referral      Adult GI  Referral - Procedure Only      Reason for your hospital stay    Concern for GI bleeding, underwent EGD and colonoscopy.     Activity    Your activity upon discharge: activity as tolerated and activity as tolerated and no driving for today     Resume Home Care Services     Follow Up    Follow up with Kristyn WASHBURN to discuss possible outpatient pill camera procedure.     Resume Home Care Services     Diet    Follow this diet upon discharge: Current Diet:Orders Placed This Encounter      Regular Diet Adult     Hospital Follow-up with Existing Primary Care Provider (PCP)    Please see details below            Significant Results and  Procedures   Most Recent 3 CBC's:  Recent Labs   Lab Test 02/13/25  1502 02/13/25  0609 02/13/25  0148 02/12/25  1623 01/04/25  0901   WBC  --  4.6  --  5.9 7.5   HGB 11.1* 10.5* 10.5* 10.8* 11.6*   MCV  --  98  --  98 97   PLT  --  157  --  194 402     Most Recent 3 BMP's:  Recent Labs   Lab Test 02/13/25  0609 02/12/25  1623 01/02/25  0957    143 139   POTASSIUM 4.3 4.1 4.1   CHLORIDE 105 106 101   CO2 29 28 26   BUN 11.4 13.9 9.2   CR 0.85 0.93 0.70   ANIONGAP 8 9 12   MARITZA 8.5* 8.6* 9.0   GLC 91 125* 107*   ,   Results for orders placed or performed during the hospital encounter of 12/31/24   Chest XR,  PA & LAT    Narrative    EXAM: XR CHEST 2 VIEWS  LOCATION: [facility]  DATE: 12/31/2024    INDICATION: Shortness of breath  COMPARISON: None.      Impression    IMPRESSION: Reinflation of both lungs. Mild to moderate amount of by basilar infiltrates greatest on the left. Minimal pleural fluid or thickening bilaterally greatest on the left. Minimally calcified thoracic aortic arch. Moderate hypertrophic changes   of the spine. Slight bilateral apical pleural scarring.   XR Pelvis 1/2 Views    Narrative    EXAM: XR PELVIS 1/2 VIEWS  LOCATION: [facility]  DATE: 12/31/2024    INDICATION: 2 weeks of atraumatic pelvic pain  COMPARISON: None.      Impression    IMPRESSION: No acute fracture or malalignment. Mild degenerative arthritis of both hips, slightly greater on the left. Multilevel degenerative changes lower lumbar spine. Osseous demineralization.   CT Chest Pulmonary Embolism w Contrast    Narrative    EXAM: CT CHEST PULMONARY EMBOLISM W CONTRAST  LOCATION: [facility]  DATE: 12/31/2024    INDICATION: Two weeks of increased exertional shortness of breath, elevated dimer, rule out evidence of PE or other pathology.  COMPARISON: Chest x-ray 2 views 12/31/2024 at 2058 hours.  TECHNIQUE: CT chest pulmonary angiogram during arterial  phase injection of IV contrast. Multiplanar reformats and MIP reconstructions were performed. Dose reduction techniques were used.   CONTRAST: 59 mL Isovue 370.    FINDINGS:  ANGIOGRAM CHEST: No pulmonary emboli on either side. Atherosclerotic normal caliber thoracic aorta without aneurysm or dissection. No CT evidence of right heart strain.    LUNGS AND PLEURA: Mild emphysematous changes. Scarring in the apices. Airspace infiltrates in both lungs, involving the right middle and both lower lobes, most apparent involving the left lower lobe. Debris-filled left lower lobe tubular bronchiectasis.   Small left and tiny right pleural effusions.    MEDIASTINUM/AXILLAE: Normal cardiac size. Moderate coronary artery calcifications. No pericardial effusion. No suspicious adenopathy. Trachea is midline.    CORONARY ARTERY CALCIFICATION: Moderate.    UPPER ABDOMEN: Cholelithiasis. Atherosclerotic changes.    MUSCULOSKELETAL: Degenerative changes both shoulders and the thoracic spine.      Impression    IMPRESSION:  1.  No pulmonary emboli on either side. Airspace infiltrates bilaterally, more apparent involving the left lower lobe. Debris-filled left lower lobe tubular bronchiectasis. Findings most likely represent an infectious or an inflammatory process. Small   left and tiny right pleural effusions.    2.  Atherosclerotic normal caliber thoracic aorta without aneurysm or dissection. Normal cardiac size. Moderate coronary artery calcifications.   CT Hip Left w/o Contrast    Narrative    EXAM: CT HIP LEFT W/O CONTRAST  LOCATION: [facility]  DATE: 1/1/2025    INDICATION: Left hip pain. Concern for occult fracture.  COMPARISON: None.  TECHNIQUE: Noncontrast. Axial, sagittal and coronal thin-section reconstruction. Dose reduction techniques were used.     FINDINGS:   No acute fracture.    Mild degenerative arthritis of the left hip joint. Degenerative change of the left SI joint.    No fluid collection  or hematoma. Enlarged prostate. Contrast in the bladder lumen and bladder diverticula.      Impression    IMPRESSION:  1.  No acute appearing abnormality. No acute fracture. MRI is more sensitive and should be considered if there is high clinical suspicion or symptoms persist.  2.  Mild degenerative arthritis of the left hip joint     US Lower Extremity Venous Duplex Bilateral    Narrative    EXAM: ULTRASOUND LOWER EXTREMITY VENOUS DUPLEX BILATERAL  LOCATION: [facility]  DATE: 01/01/2025    INDICATION: Leg pain and edema, evaluate for DVT.  COMPARISON: None available.  TECHNIQUE: Venous Duplex ultrasound of bilateral lower extremities with and without compression, augmentation and duplex. Color flow and spectral Doppler with waveform analysis performed.    FINDINGS: Exam includes the common femoral, femoral, popliteal veins as well as segmentally visualized deep calf veins and greater saphenous vein.     RIGHT: No deep vein thrombosis. No superficial thrombophlebitis. No popliteal cyst.    LEFT: No deep vein thrombosis. No superficial thrombophlebitis. No popliteal cyst.      Impression    IMPRESSION:  1.  No deep venous thrombosis in the bilateral lower extremities.           Discharge Medications   Discharge Medication List as of 2/15/2025 12:45 PM        CONTINUE these medications which have NOT CHANGED    Details   acetaminophen (TYLENOL) 325 MG tablet Take 2 tablets (650 mg) by mouth every 4 hours as needed for mild pain or other (and adjunct with moderate or severe pain or per patient request)., Transitional      donepezil (ARICEPT) 5 MG tablet Take 1 tablet (5 mg) by mouth every evening., Transitional      escitalopram (LEXAPRO) 5 MG tablet Take 5 mg by mouth at bedtime., Historical      famciclovir (FAMVIR) 500 MG tablet Take 1 tablet (500 mg) by mouth 2 times daily., Transitional      fluorometholone (FLAREX) 0.1 % ophthalmic suspension Place 1 drop into the right eye every  morning., Transitional      furosemide (LASIX) 20 MG tablet Take 10 mg by mouth every morning., Historical      losartan (COZAAR) 25 MG tablet Take 1 tablet (25 mg) by mouth daily., Transitional      metoprolol succinate ER (TOPROL XL) 25 MG 24 hr tablet Take 1 tablet (25 mg) by mouth daily., Transitional      !! polyethylene glycol-propylene glycol (SYSTANE ULTRA) 0.4-0.3 % SOLN ophthalmic solution Place 1 drop Into the left eye every morning., Historical      !! polyethylene glycol-propylene glycol (SYSTANE ULTRA) 0.4-0.3 % SOLN ophthalmic solution Place 1 drop Into the left eye daily as needed for dry eyes., Historical      rosuvastatin (CRESTOR) 10 MG tablet Take 1 tablet (10 mg) by mouth daily., Transitional      tamsulosin (FLOMAX) 0.4 MG capsule Take 1 capsule (0.4 mg) by mouth every evening., Transitional      tolterodine ER (DETROL LA) 2 MG 24 hr capsule Take 2 mg by mouth every morning., Historical      traZODone (DESYREL) 12.5 mg TABS quarter-tab Take 150 mg by mouth nightly as needed for sleep., Historical      famotidine (PEPCID) 20 MG tablet Take 1 tablet (20 mg) by mouth 2 times daily., Transitional       !! - Potential duplicate medications found. Please discuss with provider.        Allergies   No Known Allergies

## 2025-02-15 NOTE — PROGRESS NOTES
diagnostic tests and consults completed and resulted met,  -vital signs normal or at patient baseline not met, BP high, not meeting parameters  -tolerating oral intake to maintain hydration met  -returns to baseline functional status met, AX1 with JOHNNA and walker  -safe disposition plan has been identified not met  -GI bleed stable, decision regarding endoscopy made, okay with GI (consulted) met

## 2025-02-15 NOTE — PLAN OF CARE
Goal Outcome Evaluation:      Plan of Care Reviewed With: patient    Overall Patient Progress: improvingOverall Patient Progress: improving     Top portion must ALWAYS be completed  PRIMARY Concern: melena  SAFETY RISK Concerns (fall risk, behaviors, etc.): fall risk      Aggression Tool Color: green  Isolation/Type: none  Tests/Procedures for NEXT shift: am labs  Consults? (Pending/following, signed-off?) GI s/o  Where is patient from? (Home, TCU, etc.):   Other Important info for NEXT shift: am labs  Anticipated DC date & active delays: possible am DC  _____________________________________________________________________________  SUMMARY NOTE:  Orientation/Cognitive: A&Ox4, forgetful at times   Observation Goals (Met/ Not Met): Not met   Mobility Level/Assist Equipment: Ax1 G/W  Antibiotics & Plan (IV/po, length of tx left): N/a  Pain Management: Denies   Tele/VS/O2: VSS on RA   ABNL Lab/BG: Hgb=11.1  Diet: tolerating regular diet  Bowel/Bladder: continent   Skin Concerns: Scattered bruising  Drains/Devices:  PIV saline locked  Patient Stated Goal for Today: to go home

## 2025-02-15 NOTE — PLAN OF CARE
Goal Outcome Evaluation:      Plan of Care Reviewed With: patient    Discharge instructions discussed and questions answered. Discharging home and Marisol dillon providing ride.

## 2025-02-15 NOTE — PLAN OF CARE
Physical Therapy Discharge Summary    Reason for therapy discharge:    Discharged to home with home therapy.    Progress towards therapy goal(s). See goals on Care Plan in [facility] electronic health record for goal details.  Goals partially met.  Barriers to achieving goals:   discharge from facility and discharge on same date as initial evaluation.    Therapy recommendation(s):    Continued therapy is recommended.  Rationale/Recommendations:  home PT to further address deficits and optimize functional independence and safety.

## 2025-02-15 NOTE — PROGRESS NOTES
diagnostic tests and consults completed and resulted not met, PT consult pending  -vital signs normal or at patient baseline not met, BP high, not meeting parameters  -tolerating oral intake to maintain hydration met  -returns to baseline functional status not met, AX1 with GB and walker  -safe disposition plan has been identified not met  -GI bleed stable, decision regarding endoscopy made, okay with GI (consulted) met

## 2025-02-15 NOTE — PROGRESS NOTES
Goal Outcome Evaluation:  PRIMARY Concern: Melena   SAFETY RISK Concerns (fall risk, behaviors, etc.): Fall      Aggression Tool Color: Green   Isolation/Type: None   Tests/Procedures for NEXT shift:none, s/p colonoscopy today  Consults? (Pending/following, signed-off?) GI following, PT consult pending  Where is patient from? (Home, TCU, etc.): Home, lives alone  Other Important info for NEXT shift: none   Anticipated DC date & active delays: TBD     SUMMARY NOTE:     Orientation/Cognitive: A&Ox4, forgetful at times   Observation Goals (Met/ Not Met): Not met   Mobility Level/Assist Equipment: Ax1 G/W  Antibiotics & Plan (IV/po, length of tx left): N/a  Pain Management: Denies   Tele/VS/O2: VSS on RA   ABNL Lab/BG: Hgb=11.1  Diet: tolerating regulars  Bowel/Bladder: continent this shift  Skin Concerns: Scattered bruising  Drains/Devices:  PIV Sled  Patient Stated Goal for Today: to go for walks

## 2025-02-17 LAB
PATH REPORT.COMMENTS IMP SPEC: NORMAL
PATH REPORT.COMMENTS IMP SPEC: NORMAL
PATH REPORT.FINAL DX SPEC: NORMAL
PATH REPORT.GROSS SPEC: NORMAL
PATH REPORT.MICROSCOPIC SPEC OTHER STN: NORMAL
PATH REPORT.RELEVANT HX SPEC: NORMAL
PHOTO IMAGE: NORMAL

## 2025-02-17 PROCEDURE — 88305 TISSUE EXAM BY PATHOLOGIST: CPT | Mod: 26 | Performed by: PATHOLOGY

## 2025-05-11 ENCOUNTER — HOSPITAL ENCOUNTER (INPATIENT)
Facility: CLINIC | Age: OVER 89
LOS: 1 days | Discharge: HOME HEALTH | DRG: 194 | End: 2025-05-13
Admitting: HOSPITALIST
Payer: MEDICARE

## 2025-05-11 ENCOUNTER — APPOINTMENT (OUTPATIENT)
Dept: GENERAL RADIOLOGY | Facility: CLINIC | Age: OVER 89
DRG: 194 | End: 2025-05-11
Payer: MEDICARE

## 2025-05-11 DIAGNOSIS — J18.9 PNEUMONIA OF LEFT LOWER LOBE DUE TO INFECTIOUS ORGANISM: ICD-10-CM

## 2025-05-11 DIAGNOSIS — Z76.89 HEALTH CARE HOME: Primary | ICD-10-CM

## 2025-05-11 LAB
ALBUMIN SERPL BCG-MCNC: 3.6 G/DL (ref 3.5–5.2)
ALBUMIN UR-MCNC: NEGATIVE MG/DL
ALP SERPL-CCNC: 84 U/L (ref 40–150)
ALT SERPL W P-5'-P-CCNC: 15 U/L (ref 0–70)
ANION GAP SERPL CALCULATED.3IONS-SCNC: 13 MMOL/L (ref 7–15)
APPEARANCE UR: CLEAR
AST SERPL W P-5'-P-CCNC: 16 U/L (ref 0–45)
BASE EXCESS BLDV CALC-SCNC: 5 MMOL/L (ref -3–3)
BASOPHILS # BLD AUTO: 0 10E3/UL (ref 0–0.2)
BASOPHILS NFR BLD AUTO: 0 %
BILIRUB SERPL-MCNC: 1.2 MG/DL
BILIRUB UR QL STRIP: NEGATIVE
BUN SERPL-MCNC: 18.9 MG/DL (ref 8–23)
CALCIUM SERPL-MCNC: 9 MG/DL (ref 8.8–10.4)
CHLORIDE SERPL-SCNC: 102 MMOL/L (ref 98–107)
COLOR UR AUTO: ABNORMAL
CREAT SERPL-MCNC: 0.84 MG/DL (ref 0.67–1.17)
EGFRCR SERPLBLD CKD-EPI 2021: 83 ML/MIN/1.73M2
EOSINOPHIL # BLD AUTO: 0.1 10E3/UL (ref 0–0.7)
EOSINOPHIL NFR BLD AUTO: 1 %
ERYTHROCYTE [DISTWIDTH] IN BLOOD BY AUTOMATED COUNT: 12.6 % (ref 10–15)
FLUAV RNA SPEC QL NAA+PROBE: NEGATIVE
FLUBV RNA RESP QL NAA+PROBE: NEGATIVE
GLUCOSE SERPL-MCNC: 95 MG/DL (ref 70–99)
GLUCOSE UR STRIP-MCNC: NEGATIVE MG/DL
HCO3 BLDV-SCNC: 30 MMOL/L (ref 21–28)
HCO3 SERPL-SCNC: 26 MMOL/L (ref 22–29)
HCT VFR BLD AUTO: 39.5 % (ref 40–53)
HGB BLD-MCNC: 13.1 G/DL (ref 13.3–17.7)
HGB UR QL STRIP: NEGATIVE
IMM GRANULOCYTES # BLD: 0.1 10E3/UL
IMM GRANULOCYTES NFR BLD: 1 %
KETONES UR STRIP-MCNC: NEGATIVE MG/DL
LACTATE BLD-SCNC: 0.6 MMOL/L (ref 0.7–2)
LEUKOCYTE ESTERASE UR QL STRIP: NEGATIVE
LYMPHOCYTES # BLD AUTO: 1.3 10E3/UL (ref 0.8–5.3)
LYMPHOCYTES NFR BLD AUTO: 14 %
MCH RBC QN AUTO: 31.9 PG (ref 26.5–33)
MCHC RBC AUTO-ENTMCNC: 33.2 G/DL (ref 31.5–36.5)
MCV RBC AUTO: 96 FL (ref 78–100)
MONOCYTES # BLD AUTO: 0.5 10E3/UL (ref 0–1.3)
MONOCYTES NFR BLD AUTO: 6 %
NEUTROPHILS # BLD AUTO: 7 10E3/UL (ref 1.6–8.3)
NEUTROPHILS NFR BLD AUTO: 78 %
NITRATE UR QL: NEGATIVE
NRBC # BLD AUTO: 0 10E3/UL
NRBC BLD AUTO-RTO: 0 /100
NT-PROBNP SERPL-MCNC: 948 PG/ML (ref 0–852)
PCO2 BLDV: 44 MM HG (ref 40–50)
PH BLDV: 7.45 [PH] (ref 7.32–7.43)
PH UR STRIP: 7.5 [PH] (ref 5–7)
PLATELET # BLD AUTO: 193 10E3/UL (ref 150–450)
PO2 BLDV: 28 MM HG (ref 25–47)
POTASSIUM SERPL-SCNC: 4.4 MMOL/L (ref 3.4–5.3)
PROT SERPL-MCNC: 6.4 G/DL (ref 6.4–8.3)
RBC # BLD AUTO: 4.11 10E6/UL (ref 4.4–5.9)
RBC URINE: <1 /HPF
RSV RNA SPEC NAA+PROBE: NEGATIVE
SAO2 % BLDV: 55 % (ref 70–75)
SARS-COV-2 RNA RESP QL NAA+PROBE: NEGATIVE
SODIUM SERPL-SCNC: 141 MMOL/L (ref 135–145)
SP GR UR STRIP: 1.01 (ref 1–1.03)
TROPONIN T SERPL HS-MCNC: 31 NG/L
TROPONIN T SERPL HS-MCNC: 33 NG/L
UROBILINOGEN UR STRIP-MCNC: NORMAL MG/DL
WBC # BLD AUTO: 9 10E3/UL (ref 4–11)
WBC URINE: 0 /HPF

## 2025-05-11 PROCEDURE — G0378 HOSPITAL OBSERVATION PER HR: HCPCS

## 2025-05-11 PROCEDURE — 87040 BLOOD CULTURE FOR BACTERIA: CPT

## 2025-05-11 PROCEDURE — 99285 EMERGENCY DEPT VISIT HI MDM: CPT | Mod: 25

## 2025-05-11 PROCEDURE — 93005 ELECTROCARDIOGRAM TRACING: CPT

## 2025-05-11 PROCEDURE — 96361 HYDRATE IV INFUSION ADD-ON: CPT

## 2025-05-11 PROCEDURE — 83880 ASSAY OF NATRIURETIC PEPTIDE: CPT

## 2025-05-11 PROCEDURE — 258N000003 HC RX IP 258 OP 636

## 2025-05-11 PROCEDURE — 85025 COMPLETE CBC W/AUTO DIFF WBC: CPT

## 2025-05-11 PROCEDURE — 250N000011 HC RX IP 250 OP 636

## 2025-05-11 PROCEDURE — 83605 ASSAY OF LACTIC ACID: CPT

## 2025-05-11 PROCEDURE — 96365 THER/PROPH/DIAG IV INF INIT: CPT

## 2025-05-11 PROCEDURE — 36415 COLL VENOUS BLD VENIPUNCTURE: CPT

## 2025-05-11 PROCEDURE — 258N000003 HC RX IP 258 OP 636: Performed by: NURSE PRACTITIONER

## 2025-05-11 PROCEDURE — 71046 X-RAY EXAM CHEST 2 VIEWS: CPT

## 2025-05-11 PROCEDURE — 84484 ASSAY OF TROPONIN QUANT: CPT

## 2025-05-11 PROCEDURE — 81001 URINALYSIS AUTO W/SCOPE: CPT

## 2025-05-11 PROCEDURE — 82040 ASSAY OF SERUM ALBUMIN: CPT

## 2025-05-11 PROCEDURE — 87637 SARSCOV2&INF A&B&RSV AMP PRB: CPT

## 2025-05-11 PROCEDURE — 99223 1ST HOSP IP/OBS HIGH 75: CPT | Performed by: NURSE PRACTITIONER

## 2025-05-11 PROCEDURE — 96367 TX/PROPH/DG ADDL SEQ IV INF: CPT

## 2025-05-11 RX ORDER — AMOXICILLIN 250 MG
2 CAPSULE ORAL 2 TIMES DAILY PRN
Status: DISCONTINUED | OUTPATIENT
Start: 2025-05-11 | End: 2025-05-13 | Stop reason: HOSPADM

## 2025-05-11 RX ORDER — AMOXICILLIN 250 MG
1 CAPSULE ORAL 2 TIMES DAILY PRN
Status: DISCONTINUED | OUTPATIENT
Start: 2025-05-11 | End: 2025-05-13 | Stop reason: HOSPADM

## 2025-05-11 RX ORDER — AZITHROMYCIN 250 MG/1
250 TABLET, FILM COATED ORAL DAILY
Status: DISCONTINUED | OUTPATIENT
Start: 2025-05-12 | End: 2025-05-13 | Stop reason: HOSPADM

## 2025-05-11 RX ORDER — LORAZEPAM 0.5 MG/1
0.5 TABLET ORAL DAILY PRN
Status: ON HOLD | COMMUNITY
End: 2025-05-19

## 2025-05-11 RX ORDER — ONDANSETRON 4 MG/1
4 TABLET, ORALLY DISINTEGRATING ORAL EVERY 6 HOURS PRN
Status: DISCONTINUED | OUTPATIENT
Start: 2025-05-11 | End: 2025-05-13 | Stop reason: HOSPADM

## 2025-05-11 RX ORDER — AZITHROMYCIN MONOHYDRATE 500 MG/5ML
500 INJECTION, POWDER, LYOPHILIZED, FOR SOLUTION INTRAVENOUS ONCE
Status: COMPLETED | OUTPATIENT
Start: 2025-05-11 | End: 2025-05-11

## 2025-05-11 RX ORDER — ACETAMINOPHEN 325 MG/1
650 TABLET ORAL EVERY 4 HOURS PRN
Status: DISCONTINUED | OUTPATIENT
Start: 2025-05-11 | End: 2025-05-13 | Stop reason: HOSPADM

## 2025-05-11 RX ORDER — PROCHLORPERAZINE MALEATE 5 MG/1
5 TABLET ORAL EVERY 6 HOURS PRN
Status: DISCONTINUED | OUTPATIENT
Start: 2025-05-11 | End: 2025-05-13 | Stop reason: HOSPADM

## 2025-05-11 RX ORDER — ACETAMINOPHEN 650 MG/1
650 SUPPOSITORY RECTAL EVERY 4 HOURS PRN
Status: DISCONTINUED | OUTPATIENT
Start: 2025-05-11 | End: 2025-05-13 | Stop reason: HOSPADM

## 2025-05-11 RX ORDER — SODIUM CHLORIDE 9 MG/ML
INJECTION, SOLUTION INTRAVENOUS CONTINUOUS
Status: ACTIVE | OUTPATIENT
Start: 2025-05-11 | End: 2025-05-12

## 2025-05-11 RX ORDER — CEFTRIAXONE 1 G/1
1 INJECTION, POWDER, FOR SOLUTION INTRAMUSCULAR; INTRAVENOUS EVERY 24 HOURS
Status: DISCONTINUED | OUTPATIENT
Start: 2025-05-12 | End: 2025-05-13 | Stop reason: HOSPADM

## 2025-05-11 RX ORDER — ONDANSETRON 2 MG/ML
4 INJECTION INTRAMUSCULAR; INTRAVENOUS EVERY 6 HOURS PRN
Status: DISCONTINUED | OUTPATIENT
Start: 2025-05-11 | End: 2025-05-13 | Stop reason: HOSPADM

## 2025-05-11 RX ORDER — CEFTRIAXONE 2 G/1
2 INJECTION, POWDER, FOR SOLUTION INTRAMUSCULAR; INTRAVENOUS ONCE
Status: COMPLETED | OUTPATIENT
Start: 2025-05-11 | End: 2025-05-11

## 2025-05-11 RX ADMIN — SODIUM CHLORIDE 500 ML: 0.9 INJECTION, SOLUTION INTRAVENOUS at 18:41

## 2025-05-11 RX ADMIN — SODIUM CHLORIDE: 0.9 INJECTION, SOLUTION INTRAVENOUS at 22:11

## 2025-05-11 RX ADMIN — AZITHROMYCIN MONOHYDRATE 500 MG: 500 INJECTION, POWDER, LYOPHILIZED, FOR SOLUTION INTRAVENOUS at 19:49

## 2025-05-11 RX ADMIN — CEFTRIAXONE SODIUM 2 G: 2 INJECTION, POWDER, FOR SOLUTION INTRAMUSCULAR; INTRAVENOUS at 18:41

## 2025-05-11 ASSESSMENT — ACTIVITIES OF DAILY LIVING (ADL)
ADLS_ACUITY_SCORE: 58
ADLS_ACUITY_SCORE: 54
ADLS_ACUITY_SCORE: 58

## 2025-05-11 NOTE — ED NOTES
Pt reports feeling increased anxiety this morning. Pt denies weakness at home today. Pt reports urinary incontinence for the past 2 weeks. Pt denies any pain or other symptoms aside from a baseline cough on arrival.

## 2025-05-11 NOTE — H&P
[facility]  History and Physical - Hospitalist Service       Date of Admission:  5/11/2025  PRIMARY CARE PROVIDER:    Karolyn Aiken    Assessment & Plan   Riki Augusto Alvarez is a 89 year old male with a past medical history significant for hypertension, hyperlipidemia, cognitive impairment, gastroesophageal reflux disease, benign prostatic hypertrophy, mood disorder, past pneumonia 1/2025, suspected duodenal ulcer s/p EGD 02/2025 who presents to the ED for evaluation of generalized weakness, cough.    Community acquired pneumonia  Generalized Weakness   Notes ongoing weakness x 1 week with cough. Denies fever, chills, chest pain. HD stable in ED, no supplemental oxygen requirements.  - PO Azithromycin 250 mg x 4.    - IV ceftriaxone while hospitalized and plan to transition to PO Omnicef.    - IV fluids at 75  ml/hr x 10 hours.    - Supplemental O2 available as needed; wean as able.    - Encourage use of IS/Flutter valve.    - PRN DuoNebs available every 2 hours for wheezing/shortness of breath.  Consider discharging patient with inhaler.    - Vital signs every 4 hours.   - PT consult, SW/CM consult   - CBC ordered 5/12/25.    Elevated troponin, Chronic  -Troponin 33. Since 12/2024 trops have been elevated 30-40. Possible type II demand ischemia in the setting of CAP and anemia; asymptomatic without cardiopulmonary symptoms on admission  - monitor for clinical changes     Overactive Bladder  Follows with Dr. Butler (urology). Currently on Detrol, but still having urinary frequency, leaking. These symptoms are making it difficult to leave the house.   *UA unremarkable  - recommended follow up with Urology upon discharge     Anxiety with Recent Panic Symptoms  History of bereavement  History of insomnia  -Wife passed away October 2024. Pt has had intermittent exacerbated anxiety symptoms since but have been worse recently. Met with PCP 5/9/25, started on low dose lorazepam.  - hold  lorazepam for now     Essential hypertension  Hyperlipidemia  Monitor blood pressure and heart rate.   -Continue prior to admission losartan, metoprolol,   - Hold furosemide, due to urinary issues, consider stopping until patient can further discuss w/ PCP   - hold rosuvastatin due to obs status    Melena s/p EGD, 25  Anemia, normocytic, acute blood loss Hgb 10.8 > 10.5 > 10.5 > 11.1  Hospital admission for dark stools and concerns of melena, for details see admission note. Not on AC, no previous GIB. + FH (brother  of colon CA).    - GI consult - Dr. Simons's team  - EGD  without any findings to explain melenic stools; biopsies sent for H.pylori                - Colonoscopy  w/ normal ileum, colon normal, internal hemorrhoids.   - Anemia thought to be related to duodenal ulcer, but unclear. Plans to follow up with Kristyn WASHBURN for possible pill cam. With IV pantoprazole, Hgb remained stable. Occult stool testing negative.     Benign prostatic hypertrophy  -Continue tamsulosin     Gastroesophageal reflux disease  - continue protonix 40mg daily      History of herpes simplex virus infection of eyelid  -Continue prior to admission famciclovir and eye drops     History of cognitive impairment  -Continue donepezil     History of mood disorder  -Continue escitalopram    Disposition  Pt describes feeling overwhelmed, worried about falling due to weakness. Unclear if this is a progression of age related changes vs acute in the setting of CAP.   - may benefit from increased services at home vs discussion of placement. (Of note, discharged home with family. RN/PT/OT Home care services resumed in 2025)      Clinically Significant Risk Factors Present on Admission                   # Hypertension: Noted on problem list     # Dementia: noted on problem list           # Financial/Environmental Concerns:                Diet:  Regular   DVT Prophylaxis: Pneumatic Compression Devices  Garcia Catheter: Not  present  Lines: None     Cardiac Monitoring: None  Code Status:  DNR/I         Disposition Plan      Expected Discharge Date: 05/12/2025           Entered: DEEPA Dietrich CNP 05/11/2025, 6:58 PM       Medically Ready for Discharge: Anticipated Tomorrow      The patient's care was discussed with the Attending Physician, Dr. Machuca, Bedside Nurse, Patient, and Patient's Family.    DEEPA Dietrich CNP  [facility]  Securely message with the Vocera Web Console (learn more here)  Text page via Eachpal Paging/Directory      ______________________________________________________________________    Chief Complaint   Cough, weakness    History is obtained from the patient and EMR.      History of Present Illness   Riki Alvarez is a 89 year old male with a past medical history significant for hypertension, hyperlipidemia, cognitive impairment, gastroesophageal reflux disease, benign prostatic hypertrophy, mood disorder, past pneumonia 1/2025, suspected duodenal ulcer s/p EGD 02/2025 who presents to the ED for evaluation of generalized weakness, cough.    Patient, accompanied by his niece at the bedside notes that he has been generally weak over the past week.  He has had issues feeling he may fall, but has not actually suffered a fall.  No overt respiratory distress, but notes increased anxiety, panic attacks that typically affect him in the morning.  He denies believing that these are associated with his breathing.  Of note, the patient has had difficulty leaving his house, due to ongoing urinary leaking, incontinence.  Presents to the emergency department with weakness, cough.    ED workup is notable for grossly unremarkable BMP, normal renal function.  Lactic acid is normal at 0.6.  proBNP 948, high-sensitivity troponin 33.  CBC with hemoglobin 13.1, normal platelets.  VBG with mild metabolic alkalosis, pH 7.45, FBP139, bicarb 30.  UA obtained is unremarkable does not appear  infectious.  Viral panel negative.  Blood cultures collected in the emergency department.  2 view chest x-ray shows a patchy area of consolidation suspicious for pneumonia in the left lower lobe.    I evaluated the patient with his niece at the bedside.  He is in no distress, seems slightly anxious during discussion.  His niece notes that he has a chronic over worrier, and this has been exacerbated by his grief due to losing his wife in October 2024.  He is currently living at home independently, however has significant help from his 2 nieces where there daily.    Admit for observation, PT consult, initiation of antibiotics for CAP.    Past Medical History    I have reviewed this patient's medical history and updated it with pertinent information if needed.   Past Medical History:   Diagnosis Date    Anxiety     BPH (benign prostatic hyperplasia)     Hiatal hernia     Hyperlipidemia     Hypertension     Mild dementia (H)        Prior to Admission Medications   Prior to Admission Medications   Prescriptions Last Dose Informant Patient Reported? Taking?   acetaminophen (TYLENOL) 325 MG tablet  Self No No   Sig: Take 2 tablets (650 mg) by mouth every 4 hours as needed for mild pain or other (and adjunct with moderate or severe pain or per patient request).   donepezil (ARICEPT) 5 MG tablet  Self No No   Sig: Take 1 tablet (5 mg) by mouth every evening.   escitalopram (LEXAPRO) 5 MG tablet  Self Yes No   Sig: Take 5 mg by mouth at bedtime.   famciclovir (FAMVIR) 500 MG tablet  Self No No   Sig: Take 1 tablet (500 mg) by mouth 2 times daily.   fluorometholone (FLAREX) 0.1 % ophthalmic suspension  Self No No   Sig: Place 1 drop into the right eye every morning.   furosemide (LASIX) 20 MG tablet  Self Yes No   Sig: Take 10 mg by mouth every morning.   losartan (COZAAR) 25 MG tablet  Self No No   Sig: Take 1 tablet (25 mg) by mouth daily.   metoprolol succinate ER (TOPROL XL) 25 MG 24 hr tablet  Self No No   Sig: Take 1  tablet (25 mg) by mouth daily.   pantoprazole (PROTONIX) 40 MG EC tablet   No No   Sig: Take 1 tablet (40 mg) by mouth daily.   polyethylene glycol-propylene glycol (SYSTANE ULTRA) 0.4-0.3 % SOLN ophthalmic solution  Self Yes No   Sig: Place 1 drop Into the left eye every morning.   polyethylene glycol-propylene glycol (SYSTANE ULTRA) 0.4-0.3 % SOLN ophthalmic solution  Self Yes No   Sig: Place 1 drop Into the left eye daily as needed for dry eyes.   rosuvastatin (CRESTOR) 10 MG tablet  Self No No   Sig: Take 1 tablet (10 mg) by mouth daily.   tamsulosin (FLOMAX) 0.4 MG capsule  Self No No   Sig: Take 1 capsule (0.4 mg) by mouth every evening.   tolterodine ER (DETROL LA) 2 MG 24 hr capsule  Self Yes No   Sig: Take 2 mg by mouth every morning.   traZODone (DESYREL) 12.5 mg TABS quarter-tab  Self Yes No   Sig: Take 150 mg by mouth nightly as needed for sleep.      Facility-Administered Medications: None     Allergies   No Known Allergies    Physical Exam   Vital Signs: Temp: 98.6  F (37  C) Temp src: Oral BP: (!) 172/84 Pulse: 64   Resp: 18 SpO2: 96 %      Weight: 150 lbs 0 oz    Physical Exam  Vitals and nursing note reviewed.   Constitutional:       General: He is not in acute distress.     Appearance: He is underweight. He is not ill-appearing.   HENT:      Head:      Comments: Right eye glass, unresponsive    Cardiovascular:      Rate and Rhythm: Normal rate.   Pulmonary:      Effort: Pulmonary effort is normal. No tachypnea or accessory muscle usage.      Breath sounds: Normal breath sounds. No wheezing.   Abdominal:      General: Abdomen is flat.      Palpations: Abdomen is soft.      Tenderness: There is no abdominal tenderness.   Musculoskeletal:      Right lower leg: No edema.      Left lower leg: No edema.   Skin:     General: Skin is warm and dry.   Neurological:      Mental Status: He is alert and oriented to person, place, and time. Mental status is at baseline.   Psychiatric:         Behavior: Behavior  normal. Behavior is cooperative.         Medical Decision Making       78 MINUTES SPENT BY ME on the date of service doing chart review, history, exam, documentation & further activities per the note.         Data   Data reviewed today: I reviewed all medications, new labs and imaging results over the last 24 hours. I personally reviewed the chest x-ray image(s) showing left lobe opacity consistent with PNA.      I have personally reviewed the following data over the past 24 hrs:    9.0  \   13.1 (L)   / 193     141 102 18.9 /  95   4.4 26 0.84 \     ALT: 15 AST: 16 AP: 84 TBILI: 1.2   ALB: 3.6 TOT PROTEIN: 6.4 LIPASE: N/A     Trop: 33 (H) BNP: 948 (H)     Procal: N/A CRP: N/A Lactic Acid: 0.6 (L)         Imaging results reviewed over the past 24 hrs:   Recent Results (from the past 24 hours)   XR Chest 2 Views    Narrative    EXAM: XR CHEST 2 VIEWS  LOCATION: [facility]  DATE: 5/11/2025    INDICATION: Cough, shortness of breath   COMPARISON: 12/31/2024       Impression    IMPRESSION: Patchy area of airspace consolidation suspicious for pneumonia in the left lower lobe. Calcified aortic atherosclerosis. Mild degenerative change thoracic spine.

## 2025-05-11 NOTE — ED PROVIDER NOTES
Emergency Department Note      History of Present Illness     Chief Complaint   Generalized Weakness      HPI   Riki Alvarez is a 89 year old male with a history of hypertension presenting to the Emergency Department via EMS with his niece for evaluation of generalized weakness. Patient reports generalized weakness, shortness of breath, and urinary incontinence/urgency with associated discomfort for the last week. Shortness of breath is worsened by physical exertion. Also endorses worsening of chronic shakiness, especially in his left arm/hand. Niece went to patients home following a panic attack this morning where patient was shaking uncontrollably. This was the second incident of this in the last week. Minimal appetite recently. He also had diarrhea with irritation to the anus area following a cortisone injection one week ago, which lasted for a couple days then resolved. Chronic cough with no changes. No swelling in legs, nausea, emesis, nor blood or black colored stool. Denies any flank, abdominal, or other pain. No history of blood clots, PE, clotting disorder, stroke, or heart attack. No recent falls. No history of abdominal surgeries. Denies any recent antibiotic use or hospitalizations. Reports early signs of heart failure following a visit in January. Patient lives at home alone, with daily at home care visits. He ambulates using a walker for assistance at baseline. Patient gets melanoma spots, but no other history of cancer.    Independent Historian   Niece as detailed above.    Review of External Notes   Reviewed hospital admission from 2/12/2025 for generalized weakness with melena and anemia.   2/3/2025 proBNP 1460  12/21/2024 hospital admission for pneumonia  Past Medical History     Medical History and Problem List   Anxiety  BPH   Hiatal hernia  Hyperlipidemia  Hypertension  Mild dementia    Medications  "  Aricept  Lexapro  Famvir  Lasix  Cozaar  Toprol  Protonix  Crestor  Flomax  Tolterodine  Trazodone    Surgical History   Colonoscopy  EGD    Physical Exam     Patient Vitals for the past 24 hrs:   BP Temp Temp src Pulse Resp SpO2 Height Weight   05/11/25 2124 (!) 157/71 98.6  F (37  C) Oral 68 10 94 % 1.778 m (5' 10\") 68 kg (150 lb)   05/11/25 2100 (!) 157/74 -- -- 64 (!) 7 97 % -- --   05/11/25 2000 (!) 162/70 -- -- 63 10 97 % -- --   05/11/25 1900 (!) 173/77 -- -- 64 10 -- -- --   05/11/25 1800 (!) 170/72 -- -- 61 -- 96 % -- --   05/11/25 1700 (!) 167/76 -- -- 64 (!) 0 97 % -- --   05/11/25 1453 (!) 172/84 98.6  F (37  C) Oral 64 18 96 % 1.778 m (5' 10\") 68 kg (150 lb)   05/11/25 1451 (!) 172/84 -- -- 78 11 96 % -- --     Physical Exam  Physical Exam:  GENERAL: Warm, dry, alert, no increased work of breathing  HEENT: PERRLA, clear conjunctiva, posterior oropharynx clear  NECK: No JVD, supple without lymphadenopathy.  No stiffness or restricted range of motion  HEART: Regular rate and rhythm, no murmur or rubs  LUNGS: CTAB, moving air well.  No crackles or wheezes are heard.  ABD: Soft, nontender, nondistended, no guarding, with good bowel sounds heard.  BACK: Skin on bilateral buttocks over sacrum is erythematous, intact, not macerated, slightly tender to palpation.  No CVAT, no other obvious deformities  EXTREMITIES: Moves all extremities without difficulty, no calf tenderness or peripheral edema.  SKIN: Warm and dry without rash or lesions.  NEUROLOGICAL: No focal deficits.  CN II-XII intact.  PSYCH: Appropriate mood and affect.     Diagnostics     Lab Results   Labs Ordered and Resulted from Time of ED Arrival to Time of ED Departure   TROPONIN T, HIGH SENSITIVITY - Abnormal       Result Value    Troponin T, High Sensitivity 33 (*)    NT-PROBNP - Abnormal    NT-proBNP 948 (*)    ROUTINE UA WITH MICROSCOPIC REFLEX TO CULTURE - Abnormal    Color Urine Straw      Appearance Urine Clear      Glucose Urine " Negative      Bilirubin Urine Negative      Ketones Urine Negative      Specific Gravity Urine 1.008      Blood Urine Negative      pH Urine 7.5 (*)     Protein Albumin Urine Negative      Urobilinogen Urine Normal      Nitrite Urine Negative      Leukocyte Esterase Urine Negative      RBC Urine <1      WBC Urine 0     CBC WITH PLATELETS AND DIFFERENTIAL - Abnormal    WBC Count 9.0      RBC Count 4.11 (*)     Hemoglobin 13.1 (*)     Hematocrit 39.5 (*)     MCV 96      MCH 31.9      MCHC 33.2      RDW 12.6      Platelet Count 193      % Neutrophils 78      % Lymphocytes 14      % Monocytes 6      % Eosinophils 1      % Basophils 0      % Immature Granulocytes 1      NRBCs per 100 WBC 0      Absolute Neutrophils 7.0      Absolute Lymphocytes 1.3      Absolute Monocytes 0.5      Absolute Eosinophils 0.1      Absolute Basophils 0.0      Absolute Immature Granulocytes 0.1      Absolute NRBCs 0.0     ISTAT GASES LACTATE VENOUS POCT - Abnormal    Lactic Acid POCT 0.6 (*)     Bicarbonate Venous POCT 30 (*)     O2 Sat, Venous POCT 55 (*)     pCO2 Venous POCT 44      pH Venous POCT 7.45 (*)     pO2 Venous POCT 28      Base Excess/Deficit (+/-) POCT 5.0 (*)    TROPONIN T, HIGH SENSITIVITY - Abnormal    Troponin T, High Sensitivity 31 (*)    COMPREHENSIVE METABOLIC PANEL - Normal    Sodium 141      Potassium 4.4      Carbon Dioxide (CO2) 26      Anion Gap 13      Urea Nitrogen 18.9      Creatinine 0.84      GFR Estimate 83      Calcium 9.0      Chloride 102      Glucose 95      Alkaline Phosphatase 84      AST 16      ALT 15      Protein Total 6.4      Albumin 3.6      Bilirubin Total 1.2     INFLUENZA A/B, RSV AND SARS-COV2 PCR - Normal    Influenza A PCR Negative      Influenza B PCR Negative      RSV PCR Negative      SARS CoV2 PCR Negative     BLOOD CULTURE   BLOOD CULTURE       Imaging   XR Chest 2 Views   Final Result   IMPRESSION: Patchy area of airspace consolidation suspicious for pneumonia in the left lower lobe.  Calcified aortic atherosclerosis. Mild degenerative change thoracic spine.           EKG   ECG results from 05/11/25   EKG 12-lead, tracing only     Value    Systolic Blood Pressure     Diastolic Blood Pressure     Ventricular Rate 68    Atrial Rate 68    TN Interval 164    QRS Duration 108        QTc 446    P Axis 82    R AXIS -50    T Axis 35    Interpretation ECG      Sinus rhythm with Premature supraventricular complexes and with occasional Premature ventricular complexes  Left axis deviation  Right bundle branch block  Inferior infarct , age undetermined  Abnormal ECG  When compared with ECG of 12-Feb-2025 23:48,  Significant changes have occurred          Independent Interpretation   CXR: Opacity left lower lobe.    ED Course      Medications Administered   Medications   senna-docusate (SENOKOT-S/PERICOLACE) 8.6-50 MG per tablet 1 tablet (has no administration in time range)     Or   senna-docusate (SENOKOT-S/PERICOLACE) 8.6-50 MG per tablet 2 tablet (has no administration in time range)   ondansetron (ZOFRAN ODT) ODT tab 4 mg (has no administration in time range)     Or   ondansetron (ZOFRAN) injection 4 mg (has no administration in time range)   prochlorperazine (COMPAZINE) injection 5 mg (has no administration in time range)     Or   prochlorperazine (COMPAZINE) tablet 5 mg (has no administration in time range)   acetaminophen (TYLENOL) tablet 650 mg (has no administration in time range)     Or   acetaminophen (TYLENOL) Suppository 650 mg (has no administration in time range)   cefTRIAXone (ROCEPHIN) 1 g vial to attach to  mL bag for ADULTS or NS 50 mL bag for PEDS (has no administration in time range)   azithromycin (ZITHROMAX) tablet 250 mg (has no administration in time range)   sodium chloride 0.9 % infusion (has no administration in time range)   cefTRIAXone (ROCEPHIN) 2 g vial to attach to  ml bag for ADULTS or NS 50 ml bag for PEDS (0 g Intravenous Stopped 5/11/25 1946)    azithromycin (ZITHROMAX) 500 mg vial to attach to  mL bag (0 mg Intravenous Stopped 5/11/25 2106)   sodium chloride 0.9% BOLUS 500 mL (0 mLs Intravenous Stopped 5/11/25 2041)       Procedures   Procedures     Discussion of Management   Admitting Hospitalist, Dr. Bravo    ED Course   ED Course as of 05/11/25 2141   Sun May 11, 2025   1635 I obtained history and examined the patient as noted above.     1818 Due to anticipated need for admission, I staffed this patient with Dr. Guy.  He agreed with admission for pneumonia   1900 I spoke with hospitalist, HUBERT Eric, patient accepted for pneumonia.       Additional Documentation  None    Medical Decision Making / Diagnosis     CMS Diagnoses: None    MIPS            None    MDM   Riki Alvarez is a 89 year old male with a history of hypertension presenting to the Emergency Department via EMS with his niece for evaluation of generalized weakness.  Differential considered includes but is not limited to ACS, congestive heart failure, pneumonia, acute cystitis, pyelonephritis, metabolic derangement, dehydration, and gastroenteritis among many others.  Vitals reassuring without fever, tachycardia, hypoxia.  On physical exam, abdominal exam is benign, lung sounds clinic without wheezing or rhonchi, there were no focal deficits.    Lactic acid was 0.6, although he appears ill he is not septic.  Patient had a mild anemia however he is above his baseline.  There is no leukocytosis.  No significant metabolic derangements.  Urinalysis not consistent with acute cystitis.  ECG did have new PVCs however no ischemic changes.  Initial troponin was below baseline at 33, delta was 31, doubt ACS today.  BNP was 948, below most recent 1460 in February 2025, no signs of fluid overload, doubt heart failure today as cause of his shortness of breath.  Chest x-ray was significant for patchy consolidation at the left lower lobe consistent with pneumonia.  Pneumonia certainly  explains generalized weakness with shortness of breath for the last week.  This 89-year-old male lives alone, he has not been able to perform ADLs due to weakness.  Would benefit from admission for pneumonia with resultant generalized weakness.  Due to anticipated need for admission, I staffed this patient with Dr. Guy.  I spoke with hospitalist, patient was accepted for admission.  Patient was subsequently admitted in stable condition.      Disposition   The patient was admitted to the hospital.     Diagnosis     ICD-10-CM    1. Pneumonia of left lower lobe due to infectious organism  J18.9            Discharge Medications   Current Discharge Medication List            Scribe Disclosure:  I, Estrada Barnes, am serving as a scribe at 4:55 PM on 5/11/2025 to document services personally performed by Tyler Torres PA-C based on my observations and the provider's statements to me.        Tyler Torres PA-C  05/11/25 [id number]

## 2025-05-11 NOTE — ED TRIAGE NOTES
Pt BIB EMS. EMS reports pt woke up this morning around 0400 feeling shaky and weak. EMS reports pt felt he could barely get out of bed today and has not had anything to eat yet today. Pt presents to room incontinent of urine and able to stand with assistance.

## 2025-05-11 NOTE — ED PROVIDER NOTES
Emergency Department Attending Supervision Note  5/11/2025  6:17 PM      I evaluated this patient in conjunction with Tyler Torres PA-C      Briefly, the patient presented with weakness and has a baseline cough.  He has been anxious as well.  Having hard time getting around.      On my exam, patient with good sats.  No focal weakness.      ED Course: White blood cell count is normal, lactate normal, troponin is high at 33 but with his age, will get a delta troponin unlikely to be the cause.  He does have a left lower lobe pneumonia on chest x-ray which is a likely cause for his increased weakness.  This patient is not safe to be home alone right now with his weakness at 89 years old.  Comprehensive panel was normal.  Will get the second delta troponin but will be admitted on Rocephin and Zithromax.      I agree with Tyler's impression and plan.      Diagnosis  Left lower lobe pneumonia  Generalized weakness  Elevated troponin      MD Brooks Terrazas Tracy Alan, MD  05/11/25 2421

## 2025-05-12 ENCOUNTER — APPOINTMENT (OUTPATIENT)
Dept: PHYSICAL THERAPY | Facility: CLINIC | Age: OVER 89
DRG: 194 | End: 2025-05-12
Attending: NURSE PRACTITIONER
Payer: MEDICARE

## 2025-05-12 LAB
ATRIAL RATE - MUSE: 68 BPM
DIASTOLIC BLOOD PRESSURE - MUSE: NORMAL MMHG
INTERPRETATION ECG - MUSE: NORMAL
P AXIS - MUSE: 82 DEGREES
PR INTERVAL - MUSE: 164 MS
QRS DURATION - MUSE: 108 MS
QT - MUSE: 420 MS
QTC - MUSE: 446 MS
R AXIS - MUSE: -50 DEGREES
SYSTOLIC BLOOD PRESSURE - MUSE: NORMAL MMHG
T AXIS - MUSE: 35 DEGREES
VENTRICULAR RATE- MUSE: 68 BPM

## 2025-05-12 PROCEDURE — G0378 HOSPITAL OBSERVATION PER HR: HCPCS

## 2025-05-12 PROCEDURE — 250N000013 HC RX MED GY IP 250 OP 250 PS 637: Performed by: NURSE PRACTITIONER

## 2025-05-12 PROCEDURE — 250N000011 HC RX IP 250 OP 636: Performed by: NURSE PRACTITIONER

## 2025-05-12 PROCEDURE — 99233 SBSQ HOSP IP/OBS HIGH 50: CPT | Performed by: NURSE PRACTITIONER

## 2025-05-12 PROCEDURE — 97116 GAIT TRAINING THERAPY: CPT | Mod: GP

## 2025-05-12 PROCEDURE — 120N000001 HC R&B MED SURG/OB

## 2025-05-12 PROCEDURE — 96361 HYDRATE IV INFUSION ADD-ON: CPT

## 2025-05-12 PROCEDURE — 97161 PT EVAL LOW COMPLEX 20 MIN: CPT | Mod: GP

## 2025-05-12 RX ORDER — VALACYCLOVIR HYDROCHLORIDE 1 G/1
1000 TABLET, FILM COATED ORAL 3 TIMES DAILY
Status: DISCONTINUED | OUTPATIENT
Start: 2025-05-12 | End: 2025-05-13 | Stop reason: HOSPADM

## 2025-05-12 RX ORDER — TAMSULOSIN HYDROCHLORIDE 0.4 MG/1
0.4 CAPSULE ORAL EVERY EVENING
Status: DISCONTINUED | OUTPATIENT
Start: 2025-05-12 | End: 2025-05-13 | Stop reason: HOSPADM

## 2025-05-12 RX ORDER — METOPROLOL SUCCINATE 25 MG/1
25 TABLET, EXTENDED RELEASE ORAL DAILY
Status: DISCONTINUED | OUTPATIENT
Start: 2025-05-12 | End: 2025-05-13 | Stop reason: HOSPADM

## 2025-05-12 RX ORDER — PANTOPRAZOLE SODIUM 40 MG/1
40 TABLET, DELAYED RELEASE ORAL DAILY
Status: DISCONTINUED | OUTPATIENT
Start: 2025-05-12 | End: 2025-05-13 | Stop reason: HOSPADM

## 2025-05-12 RX ORDER — ROSUVASTATIN CALCIUM 10 MG/1
10 TABLET, COATED ORAL DAILY
Status: DISCONTINUED | OUTPATIENT
Start: 2025-05-12 | End: 2025-05-13 | Stop reason: HOSPADM

## 2025-05-12 RX ORDER — TOLTERODINE 2 MG/1
2 CAPSULE, EXTENDED RELEASE ORAL EVERY EVENING
Status: DISCONTINUED | OUTPATIENT
Start: 2025-05-12 | End: 2025-05-13 | Stop reason: HOSPADM

## 2025-05-12 RX ORDER — FUROSEMIDE 20 MG/1
10 TABLET ORAL EVERY MORNING
Status: DISCONTINUED | OUTPATIENT
Start: 2025-05-13 | End: 2025-05-13 | Stop reason: HOSPADM

## 2025-05-12 RX ORDER — FLUOROMETHOLONE 1 MG/ML
1 SUSPENSION/ DROPS OPHTHALMIC EVERY MORNING
Status: DISCONTINUED | OUTPATIENT
Start: 2025-05-13 | End: 2025-05-13 | Stop reason: HOSPADM

## 2025-05-12 RX ORDER — LORAZEPAM 0.5 MG/1
0.5 TABLET ORAL DAILY PRN
Status: DISCONTINUED | OUTPATIENT
Start: 2025-05-12 | End: 2025-05-13 | Stop reason: HOSPADM

## 2025-05-12 RX ORDER — LOSARTAN POTASSIUM 25 MG/1
25 TABLET ORAL DAILY
Status: DISCONTINUED | OUTPATIENT
Start: 2025-05-12 | End: 2025-05-13 | Stop reason: HOSPADM

## 2025-05-12 RX ORDER — ESCITALOPRAM OXALATE 10 MG/1
10 TABLET ORAL AT BEDTIME
Status: DISCONTINUED | OUTPATIENT
Start: 2025-05-12 | End: 2025-05-13 | Stop reason: HOSPADM

## 2025-05-12 RX ORDER — DONEPEZIL HYDROCHLORIDE 5 MG/1
5 TABLET, FILM COATED ORAL EVERY EVENING
Status: DISCONTINUED | OUTPATIENT
Start: 2025-05-12 | End: 2025-05-13 | Stop reason: HOSPADM

## 2025-05-12 RX ADMIN — TAMSULOSIN HYDROCHLORIDE 0.4 MG: 0.4 CAPSULE ORAL at 19:35

## 2025-05-12 RX ADMIN — DONEPEZIL HYDROCHLORIDE 5 MG: 5 TABLET ORAL at 19:36

## 2025-05-12 RX ADMIN — LOSARTAN POTASSIUM 25 MG: 25 TABLET, FILM COATED ORAL at 16:40

## 2025-05-12 RX ADMIN — TOLTERODINE 2 MG: 2 CAPSULE, EXTENDED RELEASE ORAL at 20:07

## 2025-05-12 RX ADMIN — AZITHROMYCIN DIHYDRATE 250 MG: 250 TABLET ORAL at 10:20

## 2025-05-12 RX ADMIN — ESCITALOPRAM OXALATE 10 MG: 10 TABLET ORAL at 21:18

## 2025-05-12 RX ADMIN — CEFTRIAXONE SODIUM 1 G: 1 INJECTION, POWDER, FOR SOLUTION INTRAMUSCULAR; INTRAVENOUS at 19:35

## 2025-05-12 RX ADMIN — VALACYCLOVIR HYDROCHLORIDE 1000 MG: 1 TABLET, FILM COATED ORAL at 20:07

## 2025-05-12 RX ADMIN — METOPROLOL SUCCINATE 25 MG: 25 TABLET, EXTENDED RELEASE ORAL at 16:40

## 2025-05-12 RX ADMIN — PANTOPRAZOLE SODIUM 40 MG: 40 TABLET, DELAYED RELEASE ORAL at 16:40

## 2025-05-12 ASSESSMENT — ACTIVITIES OF DAILY LIVING (ADL)
ADLS_ACUITY_SCORE: 56
ADLS_ACUITY_SCORE: 57
ADLS_ACUITY_SCORE: 62
ADLS_ACUITY_SCORE: 56
ADLS_ACUITY_SCORE: 58
ADLS_ACUITY_SCORE: 57
ADLS_ACUITY_SCORE: 56
DEPENDENT_IADLS:: CLEANING;LAUNDRY;TRANSPORTATION
ADLS_ACUITY_SCORE: 57
ADLS_ACUITY_SCORE: 56
ADLS_ACUITY_SCORE: 56
ADLS_ACUITY_SCORE: 57
ADLS_ACUITY_SCORE: 56
ADLS_ACUITY_SCORE: 56
ADLS_ACUITY_SCORE: 57
ADLS_ACUITY_SCORE: 56
ADLS_ACUITY_SCORE: 56
ADLS_ACUITY_SCORE: 57
ADLS_ACUITY_SCORE: 57
ADLS_ACUITY_SCORE: 56

## 2025-05-12 NOTE — PROGRESS NOTES
Observation goals:     - diagnostic tests and consults completed and resulted  - not met (PT eval needed)  -vital signs normal or at patient baseline - partially met (hospitalist plans to restart home meds, will recheck vitals afterwards)  -tolerating oral intake to maintain hydration - met (ate lunch)    Nurse to notify provider when observation goals have been met and patient is ready for discharge.    Female

## 2025-05-12 NOTE — PROGRESS NOTES
RECEIVING UNIT ED HANDOFF REVIEW    ED Nurse Handoff Report was reviewed by: Jacinda Levine RN on May 11, 2025 at 8:58 PM

## 2025-05-12 NOTE — CONSULTS
Care Management Initial Consult    General Information  Assessment completed with: Patient, Riki Escalona  Type of CM/SW Visit: Initial Assessment    Primary Care Provider verified and updated as needed: Yes   Readmission within the last 30 days: no previous admission in last 30 days      Reason for Consult: discharge planning  Advance Care Planning: Advance Care Planning Reviewed: no concerns identified          Communication Assessment  Patient's communication style: spoken language (English or Bilingual)    Hearing Difficulty or Deaf: no        Cognitive  Cognitive/Neuro/Behavioral: WDL  Level of Consciousness: alert  Arousal Level: opens eyes spontaneously  Orientation: oriented x 4  Mood/Behavior: calm  Best Language: 0 - No aphasia  Speech: clear    Living Environment:   People in home: alone     Current living Arrangements: house      Able to return to prior arrangements: other (see comments)       Family/Social Support:  Care provided by: self, other (see comments)  Provides care for:    Marital Status:   Support system:            Description of Support System:           Current Resources:   Patient receiving home care services: No   -[facility] Resources: None  Equipment currently used at home: grab bar, toilet, grab bar, tub/shower  Supplies currently used at home:      Employment/Financial:  Employment Status: retired        Financial Concerns: none           Does the patient's insurance plan have a 3 day qualifying hospital stay waiver?  No    Lifestyle & Psychosocial Needs:  Social Drivers of Health     Food Insecurity: Low Risk  (1/5/2025)    Food Insecurity     Within the past 12 months, did you worry that your food would run out before you got money to buy more?: No     Within the past 12 months, did the food you bought just not last and you didn t have money to get more?: No   Depression: At risk (11/19/2024)    Received from [employer]  [facility] [facility]    PHQ-2     PHQ-2 TOTAL SCORE: 3   Housing Stability: Low Risk  (1/5/2025)    Housing Stability     Do you have housing? : Yes     Are you worried about losing your housing?: No   Tobacco Use: Medium Risk (5/11/2025)    Patient History     Smoking Tobacco Use: Former     Smokeless Tobacco Use: Never     Passive Exposure: Not on file   Financial Resource Strain: Low Risk  (1/5/2025)    Financial Resource Strain     Within the past 12 months, have you or your family members you live with been unable to get utilities (heat, electricity) when it was really needed?: No   Alcohol Use: Not on file   Transportation Needs: Low Risk  (1/5/2025)    Transportation Needs     Within the past 12 months, has lack of transportation kept you from medical appointments, getting your medicines, non-medical meetings or appointments, work, or from getting things that you need?: No   Physical Activity: Not on file   Interpersonal Safety: Low Risk  (2/14/2025)    Interpersonal Safety     Do you feel physically and emotionally safe where you currently live?: Yes     Within the past 12 months, have you been hit, slapped, kicked or otherwise physically hurt by someone?: No     Within the past 12 months, have you been humiliated or emotionally abused in other ways by your partner or ex-partner?: No   Stress: Not on file   Social Connections: Unknown (7/1/2023)    [facility]    Social Connections     Frequency of Communication with Friends and Family: Not on file   Health Literacy: Not on file       Functional Status:  Prior to admission patient needed assistance:   Dependent ADLs:: Ambulation-walker  Dependent IADLs:: Cleaning, Laundry, Transportation       Mental Health Status:  Mental Health Status: No Current Concerns       Chemical Dependency Status:                Values/Beliefs:  Spiritual, Cultural Beliefs, Lutheran Practices, Values that affect care: no      "          Discussed  Partnership in Safe Discharge Planning  document with patient/family: Yes: verbally with patient    Additional Information:  Per H&P, patient was admitted for Pneumonia and generalized weakness.   -Writer met with patient at bedside. HEREDIA/Observation Notice was given to patient. He is very worried about this. Writer and bedside RN went over this with him. He is feeling better about it now.  Per report and patient, patient lives a house alone. He was just discharged from [facility]. He tells writer \"last time I was in the hospital, I had to go to rehab but I was here for a week.\" Patient would not have Medicare coverage for TCU.  Patient will work with PT to see where is at but appears to be moving well.    Next Steps: follow for discharge planning.    Rae Baldwin RN      "

## 2025-05-12 NOTE — PROGRESS NOTES
Observation goal    PRIOR TO DISCHARGE        Comments: -diagnostic tests and consults completed and resulted- not yet  -vital signs normal or at patient baseline - not yet  -tolerating oral intake to maintain hydration not yet  Nurse to notify provider when observation goals have been met and patient is ready for discharge.   Order Class: Hospital Performed

## 2025-05-12 NOTE — ED NOTES
[facility]  ED Nurse Handoff Report    ED Chief complaint: Generalized Weakness      ED Diagnosis:   Final diagnoses:   Pneumonia of left lower lobe due to infectious organism       Code Status: To be addressed by admitting team    Allergies: No Known Allergies    Patient Story: Pt BIB EMS. EMS reports pt woke up this morning around 0400 feeling shaky and weak. EMS reports pt felt he could barely get out of bed today and has not had anything to eat yet today. Pt presents to room incontinent of urine and able to stand with assistance.               Focused Assessment:   Abnormal Labs Resulted from Time of ED Arrival to Time of ED Departure   TROPONIN T, HIGH SENSITIVITY - Abnormal       Result Value    Troponin T, High Sensitivity 33 (*)    NT-PROBNP - Abnormal    NT-proBNP 948 (*)    ROUTINE UA WITH MICROSCOPIC REFLEX TO CULTURE - Abnormal    Color Urine Straw      Appearance Urine Clear      Glucose Urine Negative      Bilirubin Urine Negative      Ketones Urine Negative      Specific Gravity Urine 1.008      Blood Urine Negative      pH Urine 7.5 (*)     Protein Albumin Urine Negative      Urobilinogen Urine Normal      Nitrite Urine Negative      Leukocyte Esterase Urine Negative      RBC Urine <1      WBC Urine 0     CBC WITH PLATELETS AND DIFFERENTIAL - Abnormal    WBC Count 9.0      RBC Count 4.11 (*)     Hemoglobin 13.1 (*)     Hematocrit 39.5 (*)     MCV 96      MCH 31.9      MCHC 33.2      RDW 12.6      Platelet Count 193      % Neutrophils 78      % Lymphocytes 14      % Monocytes 6      % Eosinophils 1      % Basophils 0      % Immature Granulocytes 1      NRBCs per 100 WBC 0      Absolute Neutrophils 7.0      Absolute Lymphocytes 1.3      Absolute Monocytes 0.5      Absolute Eosinophils 0.1      Absolute Basophils 0.0      Absolute Immature Granulocytes 0.1      Absolute NRBCs 0.0     ISTAT GASES LACTATE VENOUS POCT - Abnormal    Lactic Acid POCT 0.6 (*)     Bicarbonate Venous POCT 30  "(*)     O2 Sat, Venous POCT 55 (*)     pCO2 Venous POCT 44      pH Venous POCT 7.45 (*)     pO2 Venous POCT 28      Base Excess/Deficit (+/-) POCT 5.0 (*)        XR Chest 2 Views   Final Result   IMPRESSION: Patchy area of airspace consolidation suspicious for pneumonia in the left lower lobe. Calcified aortic atherosclerosis. Mild degenerative change thoracic spine.           Treatments and/or interventions provided:   Medications   azithromycin (ZITHROMAX) 500 mg vial to attach to  mL bag (has no administration in time range)   sodium chloride 0.9% BOLUS 500 mL (500 mLs Intravenous $New Bag 5/11/25 1841)   cefTRIAXone (ROCEPHIN) 2 g vial to attach to  ml bag for ADULTS or NS 50 ml bag for PEDS (2 g Intravenous $New Bag 5/11/25 1841)       Patient's response to treatments and/or interventions: Unchanged    To be done/followed up on inpatient unit:  Follow Plan of Care, Blood work, and Meds    Does this patient have any cognitive concerns?: A&Ox4 but forgetful    Activity level - Baseline/Home:  Independent  Activity Level - Current:   Total Care    Patient's Preferred language: English   Needed?: No    Isolation: None and COVID r/o and special precautions  Infection: COVID r/o and special precautions  Patient tested for COVID 19 prior to admission: YES  Bariatric?: No    Vital Signs:   Vitals:    05/11/25 1451 05/11/25 1453   BP: (!) 172/84 (!) 172/84   Pulse: 78 64   Resp: 11 18   Temp:  98.6  F (37  C)   TempSrc:  Oral   SpO2: 96% 96%   Weight:  68 kg (150 lb)   Height:  1.778 m (5' 10\")       Cardiac Rhythm:     Was the PSS-3 completed:   Yes  What interventions are required if any?               Family Comments: Family at Bedside  OBS brochure/video discussed/provided to patient/family: N/A    For the majority of the shift this patient's behavior was Green.   Behavioral interventions performed were None.    ED NURSE PHONE NUMBER: [phone]         "

## 2025-05-12 NOTE — PROGRESS NOTES
Observation goals:     - diagnostic tests and consults completed and resulted  - not met (PT eval needed)  -vital signs normal or at patient baseline - partially met (VS OK but needs home meds restarted & recheck vitals)  -tolerating oral intake to maintain hydration - met (ate breakfast)    Nurse to notify provider when observation goals have been met and patient is ready for discharge.

## 2025-05-12 NOTE — PLAN OF CARE
Goal Outcome Evaluation:      Plan of Care Reviewed With: patient    Overall Patient Progress: improvingOverall Patient Progress: improving    Outcome Evaluation: awaiting PT eval      Orientation: A&O x4, forgetful   Observation Goals (met?): not met  Activity Level: CGA x1 GB walker  Fall Risk: yes  Behavior & Aggression Tool Color: Green  Pain Management: Denies pain  ABNL VS/O2: VSS RA  ABNL Lab/BG: trop 33,31, BC (-) so far   Diet: tolerating Regular diet, room service with assist   Bowel/Bladder: partially incontinent   Drains/Devices: IV saline locked   Tests/Procedures for next shift: PT eval  Anticipated DC date: 5/13   Other Important Info: encourage PO intake

## 2025-05-12 NOTE — PHARMACY-ADMISSION MEDICATION HISTORY
Pharmacist Admission Medication History    Admission medication history is complete. The information provided in this note is only as accurate as the sources available at the time of the update.    Information Source(s):  via in-person    Pertinent Information:     Changes made to PTA medication list:  Added:   Deleted: None  Changed: None    Allergies reviewed with patient and updates made in EHR: no    Medication History Completed By: Angélica Terrazas [facility] 5/11/2025 8:43 PM    PTA Med List   Medication Sig Last Dose/Taking    acetaminophen (TYLENOL) 325 MG tablet Take 2 tablets (650 mg) by mouth every 4 hours as needed for mild pain or other (and adjunct with moderate or severe pain or per patient request). Taking As Needed    donepezil (ARICEPT) 5 MG tablet Take 1 tablet (5 mg) by mouth every evening. 5/10/2025    escitalopram (LEXAPRO) 10 MG tablet Take 10 mg by mouth at bedtime. 5/10/2025    famciclovir (FAMVIR) 500 MG tablet Take 1 tablet (500 mg) by mouth 2 times daily. 5/11/2025 Morning    fluorometholone (FLAREX) 0.1 % ophthalmic suspension Place 1 drop into the right eye every morning. 5/11/2025    furosemide (LASIX) 20 MG tablet Take 10 mg by mouth every morning. 5/11/2025    LORazepam (ATIVAN) 0.5 MG tablet Take 0.5 mg by mouth daily as needed for anxiety. Taking As Needed    losartan (COZAAR) 25 MG tablet Take 1 tablet (25 mg) by mouth daily. 5/11/2025    metoprolol succinate ER (TOPROL XL) 25 MG 24 hr tablet Take 1 tablet (25 mg) by mouth daily. 5/11/2025    pantoprazole (PROTONIX) 40 MG EC tablet Take 1 tablet (40 mg) by mouth daily. 5/11/2025    polyethylene glycol-propylene glycol (SYSTANE ULTRA) 0.4-0.3 % SOLN ophthalmic solution Place 1 drop Into the left eye every morning. 5/11/2025    polyethylene glycol-propylene glycol (SYSTANE ULTRA) 0.4-0.3 % SOLN ophthalmic solution Place 1 drop Into the left eye daily as needed for dry eyes. Taking As Needed    rosuvastatin (CRESTOR) 10 MG tablet Take 1  tablet (10 mg) by mouth daily. 5/11/2025    tamsulosin (FLOMAX) 0.4 MG capsule Take 1 capsule (0.4 mg) by mouth every evening. 5/10/2025    tolterodine ER (DETROL LA) 2 MG 24 hr capsule Take 2 mg by mouth every evening. 5/10/2025   Angélica JonesD

## 2025-05-12 NOTE — PLAN OF CARE
Goal Outcome Evaluation:      Plan of Care Reviewed With: patient      Summary:  Riki Alvarez is a 89 year old male with a history of hypertension presenting to the Emergency Department via EMS with his niece for evaluation of generalized weakness.  Care Plan Summary Note:  Orientation: A&O x4, forgetful at times  Observation Goals (met & not met): not yet  Activity Level: Not OOB yet  Fall Risk: yes  Behavior & Aggression Tool Color: Green  Pain Management: Denies pain  ABNL VS/O2: VSS RA  ABNL Lab/BG: trop 33,31  Diet: Regular  Bowel/Bladder: Incontient   Drains/Devices: PIV infusing NS@75ml/hr  Tests/Procedures for next shift: Anticipated DC date:   Other Important Info: pt need helps with ordering food, poor appetite and needs encouragment

## 2025-05-12 NOTE — PROGRESS NOTES
[facility]    Medicine Progress Note - Hospitalist Service    Date of Admission:  5/11/2025    Assessment & Plan   Riki Alvarez is a 89 year old male with a past medical history significant for hypertension, hyperlipidemia, cognitive impairment, gastroesophageal reflux disease, benign prostatic hypertrophy, mood disorder, past pneumonia 1/2025, suspected duodenal ulcer s/p EGD 02/2025 who presents to the ED for evaluation of generalized weakness, cough.     Community acquired pneumonia  Generalized Weakness   Notes ongoing weakness x 1 week with cough. no supplemental oxygen requirements.  - PO Azithromycin 250 mg x 4.    - IV ceftriaxone while hospitalized and plan to transition to PO Omnicef.    - Discontinue IVF  - Supplemental O2 available as needed; wean as able.    - Encourage use of IS/Flutter valve.    - PRN DuoNebs available every 2 hours for wheezing/shortness of breath.  Consider discharging patient with inhaler.    - Vital signs every 4 hours.   - PT consulted, recommendations pending   - SW/CM consult   - CBC ordered 5/13/25.     Elevated troponin, Chronic  -Troponin 33. Since 12/2024 trops have been elevated 30-40. Possible type II demand ischemia in the setting of CAP and anemia; asymptomatic without cardiopulmonary symptoms on admission  - monitor for clinical changes      Overactive Bladder  Follows with Dr. Butler (urology). Currently on Detrol, but still having urinary frequency, leaking. These symptoms are making it difficult to leave the house.   *UA unremarkable  - recommended follow up with Urology upon discharge     Anxiety with Recent Panic Symptoms  History of bereavement  History of insomnia  -Wife passed away October 2024. Pt has had intermittent exacerbated anxiety symptoms since but have been worse recently. Met with PCP 5/9/25, started on low dose lorazepam.  - hold lorazepam for now      Essential hypertension  Hyperlipidemia  Monitor blood pressure  and heart rate.   -Continue prior to admission losartan, metoprolol,   - Hold furosemide, due to urinary issues, consider stopping until patient can further discuss w/ PCP   - hold rosuvastatin due to obs status     Melena s/p EGD, 25  Anemia, normocytic, acute blood loss Hgb 10.8 > 10.5 > 10.5 > 11.1  Hospital admission for dark stools and concerns of melena, for details see admission note. Not on AC, no previous GIB. + FH (brother  of colon CA).    - GI consulted during previous admission - Dr. Simons's team  - EGD  without any findings to explain melenic stools; biopsies sent for H.pylori                - Colonoscopy  w/ normal ileum, colon normal, internal hemorrhoids.   - Anemia thought to be related to duodenal ulcer, but unclear. Plans to follow up with Kristyn [facility] for possible pill cam. With IV pantoprazole, Hgb remained stable. Occult stool testing negative.     Benign prostatic hypertrophy  -Continue tamsulosin     Gastroesophageal reflux disease  - continue protonix 40mg daily      History of herpes simplex virus infection of eyelid  -Continue prior to admission famciclovir and eye drops     History of cognitive impairment  -Continue donepezil     History of mood disorder  -Continue escitalopram     Disposition  Pt describes feeling overwhelmed, worried about falling due to weakness. Unclear if this is a progression of age related changes vs acute in the setting of CAP.   - may benefit from increased services at home vs discussion of placement. (Of note, discharged home with family. RN/PT/OT Home care services resumed in 2025)          Diet: Regular Diet Adult  Room Service    DVT Prophylaxis: Pneumatic Compression Devices  Garcia Catheter: Not present  Lines: None     Cardiac Monitoring: None  Code Status: No CPR- Do NOT Intubate      Clinically Significant Risk Factors Present on Admission                   # Hypertension: Noted on problem list     # Dementia: noted on problem list            # Financial/Environmental Concerns: none         Social Drivers of Health    Depression: At risk (11/19/2024)    Received from [facility]    PHQ-2     PHQ-2 TOTAL SCORE: 3   Tobacco Use: Medium Risk (5/11/2025)    Patient History     Smoking Tobacco Use: Former     Smokeless Tobacco Use: Never    Received from [facility]    Social Connections          Disposition Plan     Medically Ready for Discharge: Anticipated Tomorrow           The patient's care was discussed with the Attending Physician, Dr. Mansfield, Bedside Nurse, Care Coordinator/, and Patient.    DEEPA Brown [facility]  Hospitalist Service  [facility]  Securely message with [employer] (more info)  Text page via [employer] Paging/Directory   ______________________________________________________________________    Interval History   No acute events overnight. Remains on room air. Denies new or worsening pain, SOB. Tolerating meals.     Physical Exam   Vital Signs: Temp: 98.4  F (36.9  C) Temp src: Oral BP: 116/78 Pulse: 66   Resp: 18 SpO2: 97 % O2 Device: None (Room air)    Weight: 150 lbs .02 oz    Physical Exam  Vitals and nursing note reviewed.   Constitutional:       General: He is not in acute distress.     Appearance: He is underweight.   HENT:      Mouth/Throat:      Mouth: Mucous membranes are moist.   Eyes:      Pupils: Pupils are equal, round, and reactive to light.   Cardiovascular:      Rate and Rhythm: Normal rate and regular rhythm.      Pulses: Normal pulses.      Heart sounds: Normal heart sounds. No murmur heard.  Pulmonary:      Effort: Pulmonary effort is normal.      Breath sounds: Normal breath sounds.   Abdominal:      General: Bowel sounds are normal. There is no distension.      Palpations: Abdomen is soft.      Tenderness: There is no abdominal tenderness.   Skin:     General: Skin is warm and dry.      Capillary Refill:  Capillary refill takes less than 2 seconds.   Neurological:      General: No focal deficit present.      Mental Status: He is alert. Mental status is at baseline.   Psychiatric:         Mood and Affect: Mood normal.         Thought Content: Thought content normal.           Medical Decision Making       61 MINUTES SPENT BY ME on the date of service doing chart review, history, exam, documentation & further activities per the note.      Data     I have personally reviewed the following data over the past 24 hrs:    9.0  \   13.1 (L)   / 193     141 102 18.9 /  95   4.4 26 0.84 \     ALT: 15 AST: 16 AP: 84 TBILI: 1.2   ALB: 3.6 TOT PROTEIN: 6.4 LIPASE: N/A     Trop: 31 (H) BNP: 948 (H)     Procal: N/A CRP: N/A Lactic Acid: 0.6 (L)         Imaging results reviewed over the past 24 hrs:   Recent Results (from the past 24 hours)   XR Chest 2 Views    Narrative    EXAM: XR CHEST 2 VIEWS  LOCATION: [facility]  DATE: 5/11/2025    INDICATION: Cough, shortness of breath   COMPARISON: 12/31/2024       Impression    IMPRESSION: Patchy area of airspace consolidation suspicious for pneumonia in the left lower lobe. Calcified aortic atherosclerosis. Mild degenerative change thoracic spine.

## 2025-05-12 NOTE — PROGRESS NOTES
05/12/25 1442   Appointment Info   Signing Clinician's Name / Credentials (PT) Virgilio Shelton DPT   Living Environment   People in Home alone   Current Living Arrangements house   Home Accessibility stairs to enter home;stairs within home   Number of Stairs, Main Entrance 4   Stair Railings, Main Entrance railings safe and in good condition;railings on both sides of stairs   Number of Stairs, Within Home, Primary greater than 10 stairs   Stair Railings, Within Home, Primary railings safe and in good condition   Transportation Anticipated family or friend will provide   Living Environment Comments Reports living alone in a house. 4 AMY w/ railings. Notes that he does have a stair lift in his home but some times does not use. Notes that even after going up the stair lift he needs to complete another ~5 stairs.   Self-Care   Usual Activity Tolerance good   Current Activity Tolerance moderate   Equipment Currently Used at Home walker, rolling   Fall history within last six months no   Activity/Exercise/Self-Care Comment Reports uses a FWW vs 4WW for mobility. Has one on each floor. Independent w/ all ADLs. Nieces come over daily.   General Information   Onset of Illness/Injury or Date of Surgery 05/11/25   Referring Physician Danyell Eric, DEEPA CNP   Patient/Family Therapy Goals Statement (PT) Return to home   Pertinent History of Current Problem (include personal factors and/or comorbidities that impact the POC) Riki Alvarez is a 89 year old male with a past medical history significant for hypertension, hyperlipidemia, cognitive impairment, gastroesophageal reflux disease, benign prostatic hypertrophy, mood disorder, past pneumonia 1/2025, suspected duodenal ulcer s/p EGD 02/2025 who presents to the ED for evaluation of generalized weakness, cough.   Existing Precautions/Restrictions fall   Cognition   Affect/Mental Status (Cognition) WFL   Orientation Status (Cognition) oriented x 4   Follows Commands  (Cognition) WFL   Pain Assessment   Patient Currently in Pain No   Integumentary/Edema   Integumentary/Edema no deficits were identifed   Range of Motion (ROM)   ROM Comment WFLs for mobility and transfers no formal testing completed   Strength (Manual Muscle Testing)   Strength Comments Slight deconditioning noted w/ mobility and transfers. Grossly 4+/5 to BLEs   Bed Mobility   Comment, (Bed Mobility) Defered seated in bedside chair at start of session   Transfers   Comment, (Transfers) Sit to stand w/ 4WW and SBA   Gait/Stairs (Locomotion)   Comment, (Gait/Stairs) 10 ft w/ 4WW and SBA   Balance   Balance Comments No overt LOB noted   Clinical Impression   Criteria for Skilled Therapeutic Intervention Yes, treatment indicated   PT Diagnosis (PT) Impaired ambulation   Influenced by the following impairments Impaired strength, balance and activity tolerance   Functional limitations due to impairments Impaired ADLs, IADLs and functional mobility   Clinical Presentation (PT Evaluation Complexity) stable   Clinical Presentation Rationale Clinical judgment   Clinical Decision Making (Complexity) low complexity   Planned Therapy Interventions (PT) balance training;bed mobility training;gait training;home exercise program;patient/family education;stair training;strengthening;transfer training   Risk & Benefits of therapy have been explained evaluation/treatment results reviewed;care plan/treatment goals reviewed;risks/benefits reviewed;current/potential barriers reviewed;participants voiced agreement with care plan;participants included;patient   PT Total Evaluation Time   PT Eval, Low Complexity Minutes (02920) 10   Physical Therapy Goals   PT Frequency 5x/week   PT Predicted Duration/Target Date for Goal Attainment 05/22/25   PT Goals Bed Mobility;Transfers;Gait;Stairs   PT: Bed Mobility Independent;Supine to/from sit   PT: Transfers Modified independent;Sit to/from stand;Assistive device   PT: Gait Modified  "independent;Rolling walker;Greater than 200 feet   PT: Stairs Independent;Greater than 10 stairs;Rail on both sides   Interventions   Interventions Quick Adds Therapeutic Activity;Gait Training   Therapeutic Activity   Therapeutic Activities: dynamic activities to improve functional performance Minutes (14509) 5   Symptoms Noted During/After Treatment None   Treatment Detail/Skilled Intervention Pt seated in bedside chair at start of session. Agreeable to PT. Pt completing sit to stand x1 w/ 4WW and SBA. Educated on locking/unlocking of breaks at home. Reports his breaks dont work well and he just \"sits\". x10 sit to stands in a row w/ 4WW and SBA. Good isaiah and tolerance noted. Seated in bedside chair at end of session w/ call light in place and chair alarm on.   Gait Training   Gait Training Minutes (21159) 12   Symptoms Noted During/After Treatment (Gait Training) fatigue   Treatment Detail/Skilled Intervention Pt ambulating ~500 ft w/ 4WW and SBA. Good isaiah noted. R sided lean w/ mobility. No overt LOB and good balance w/ turns. Able to complete ~7 stairs w/ bilateral rails and SBA. Mix of step to vs reciprocal pattern. No LOB noted.   PT Discharge Planning   PT Plan Dynamic gait, assess bed mobility, general strengthening   PT Discharge Recommendation (DC Rec) home with assist;home with home care physical therapy   PT Rationale for DC Rec Anticipate Pt near baseline mobility. Limited by activity tolerance and weakness. Currently ambulating far distances and completing stairs w/ 4WW and SBA. Anticipate when medically ready Pt can DC to home w/ assist from nieces as needed and HHPT.   PT Brief overview of current status Goals of therapy will be to address safe mobility and make recs for d/c to next level of care. Pt and RN will continue to follow all falls risk precautions as documented by RN staff while hospitalized   PT Total Distance Amb During Session (feet) 500   Physical Therapy Time and Intention "   Timed Code Treatment Minutes 17   Total Session Time (sum of timed and untimed services) 27

## 2025-05-12 NOTE — PROGRESS NOTES
Admission    Patient arrives to room 5502 via cart from ED.  Care plan note:     Inpatient nursing criteria listed below were met:    Did you put disposition on whiteboard and in sticky note: Yes  Full skin assessment done (add LDA if skin issue present). Initials of 2nd RN :Yes ST  Isolation education started/completed NA  Patient allergies verified with patient: Yes  Fall Risk? (Care plan updated, Education given and documented) Yes  Primary Care Plan initiated: Yes  Home medications documented in belongings flowsheet: Yes  Patient belongings documented in belongings flowsheet: Yes  Reminder note (belongings/ medications) placed in discharge instructions:Yes  Admission profile/ required documentation complete: Yes  If patient is a 72 hour hold/Commitment are belongings removed from room and locked up? NA

## 2025-05-13 VITALS
DIASTOLIC BLOOD PRESSURE: 61 MMHG | HEIGHT: 70 IN | OXYGEN SATURATION: 96 % | WEIGHT: 150 LBS | RESPIRATION RATE: 16 BRPM | TEMPERATURE: 98.9 F | SYSTOLIC BLOOD PRESSURE: 119 MMHG | BODY MASS INDEX: 21.47 KG/M2 | HEART RATE: 62 BPM

## 2025-05-13 LAB
ANION GAP SERPL CALCULATED.3IONS-SCNC: 9 MMOL/L (ref 7–15)
BUN SERPL-MCNC: 14 MG/DL (ref 8–23)
CALCIUM SERPL-MCNC: 8.9 MG/DL (ref 8.8–10.4)
CHLORIDE SERPL-SCNC: 102 MMOL/L (ref 98–107)
CREAT SERPL-MCNC: 0.75 MG/DL (ref 0.67–1.17)
EGFRCR SERPLBLD CKD-EPI 2021: 86 ML/MIN/1.73M2
ERYTHROCYTE [DISTWIDTH] IN BLOOD BY AUTOMATED COUNT: 12.7 % (ref 10–15)
GLUCOSE SERPL-MCNC: 106 MG/DL (ref 70–99)
HCO3 SERPL-SCNC: 27 MMOL/L (ref 22–29)
HCT VFR BLD AUTO: 35.7 % (ref 40–53)
HGB BLD-MCNC: 12 G/DL (ref 13.3–17.7)
MCH RBC QN AUTO: 31.9 PG (ref 26.5–33)
MCHC RBC AUTO-ENTMCNC: 33.6 G/DL (ref 31.5–36.5)
MCV RBC AUTO: 95 FL (ref 78–100)
PLATELET # BLD AUTO: 182 10E3/UL (ref 150–450)
POTASSIUM SERPL-SCNC: 4 MMOL/L (ref 3.4–5.3)
RBC # BLD AUTO: 3.76 10E6/UL (ref 4.4–5.9)
SODIUM SERPL-SCNC: 138 MMOL/L (ref 135–145)
WBC # BLD AUTO: 7.8 10E3/UL (ref 4–11)

## 2025-05-13 PROCEDURE — 99239 HOSP IP/OBS DSCHRG MGMT >30: CPT | Performed by: INTERNAL MEDICINE

## 2025-05-13 PROCEDURE — 80048 BASIC METABOLIC PNL TOTAL CA: CPT | Performed by: NURSE PRACTITIONER

## 2025-05-13 PROCEDURE — 85027 COMPLETE CBC AUTOMATED: CPT | Performed by: NURSE PRACTITIONER

## 2025-05-13 PROCEDURE — 36415 COLL VENOUS BLD VENIPUNCTURE: CPT | Performed by: NURSE PRACTITIONER

## 2025-05-13 PROCEDURE — 250N000013 HC RX MED GY IP 250 OP 250 PS 637: Performed by: NURSE PRACTITIONER

## 2025-05-13 RX ORDER — CEFPODOXIME PROXETIL 200 MG/1
200 TABLET, FILM COATED ORAL 2 TIMES DAILY
Qty: 10 TABLET | Refills: 0 | Status: ON HOLD | OUTPATIENT
Start: 2025-05-13 | End: 2025-05-19

## 2025-05-13 RX ORDER — AZITHROMYCIN 250 MG/1
250 TABLET, FILM COATED ORAL DAILY
Qty: 2 TABLET | Refills: 0 | Status: ON HOLD | OUTPATIENT
Start: 2025-05-14 | End: 2025-05-19

## 2025-05-13 RX ADMIN — LOSARTAN POTASSIUM 25 MG: 25 TABLET, FILM COATED ORAL at 08:18

## 2025-05-13 RX ADMIN — AZITHROMYCIN DIHYDRATE 250 MG: 250 TABLET ORAL at 08:18

## 2025-05-13 RX ADMIN — VALACYCLOVIR HYDROCHLORIDE 1000 MG: 1 TABLET, FILM COATED ORAL at 14:02

## 2025-05-13 RX ADMIN — FUROSEMIDE 10 MG: 20 TABLET ORAL at 08:18

## 2025-05-13 RX ADMIN — METOPROLOL SUCCINATE 25 MG: 25 TABLET, EXTENDED RELEASE ORAL at 08:18

## 2025-05-13 RX ADMIN — PANTOPRAZOLE SODIUM 40 MG: 40 TABLET, DELAYED RELEASE ORAL at 08:18

## 2025-05-13 RX ADMIN — POLYETHYLENE GLYCOL 400 AND PROPYLENE GLYCOL 1 DROP: 4; 3 SOLUTION/ DROPS OPHTHALMIC at 08:18

## 2025-05-13 RX ADMIN — VALACYCLOVIR HYDROCHLORIDE 1000 MG: 1 TABLET, FILM COATED ORAL at 08:18

## 2025-05-13 RX ADMIN — FLUOROMETHOLONE 1 DROP: 1 SUSPENSION/ DROPS OPHTHALMIC at 08:18

## 2025-05-13 ASSESSMENT — ACTIVITIES OF DAILY LIVING (ADL)
ADLS_ACUITY_SCORE: 57

## 2025-05-13 NOTE — DISCHARGE SUMMARY
[facility]  Hospitalist Discharge Summary      Date of Admission:  5/11/2025  Date of Discharge:  5/13/2025  Discharging Provider: Lui Steven MD  Discharge Service: Hospitalist Service    Discharge Diagnoses   See below    Clinically Significant Risk Factors          Follow-ups Needed After Discharge   Follow-up Appointments       Hospital Follow-up with Existing Primary Care Provider (PCP)          Schedule Primary Care visit within: 7 Days               Unresulted Labs Ordered in the Past 30 Days of this Admission       Date and Time Order Name Status Description    5/11/2025  6:21 PM Blood Culture Peripheral blood (BC) Arm, Left Preliminary     5/11/2025  6:21 PM Blood Culture Peripheral blood (BC) Arm, Left Preliminary             Discharge Disposition   Discharged to home  Condition at discharge: Stable    Hospital Course   Riki Alvarez is a 89 year old male with a past medical history significant for hypertension, hyperlipidemia, cognitive impairment, gastroesophageal reflux disease, benign prostatic hypertrophy, mood disorder, past pneumonia 1/2025, suspected duodenal ulcer s/p EGD 02/2025 who presents to the ED for evaluation of generalized weakness, cough.     Community acquired pneumonia  Generalized Weakness   Notes ongoing weakness x 1 week with cough. no supplemental oxygen requirements.  Received ceftriaxone and azithromycin in the hospital. He feels overall improved and cough also improving. Remains afebrile and normal wbc.   - will discharge on Azithromycin 250mg x 2 more days and Vantin BID x 5 more days to complete treatment course  - PT evaluated and recommend home PT. Home RN/PT at discharge       Elevated troponin, Chronic  -Troponin 33. Since 12/2024 trops have been elevated 30-40. Possible type II demand ischemia in the setting of CAP and anemia; asymptomatic without cardiopulmonary symptoms on admission    Overactive Bladder  Follows with   Ugharte (urology). Currently on Detrol, but still having urinary frequency, leaking. These symptoms are making it difficult to leave the house.   *UA unremarkable  - recommended follow up with Urology upon discharge     Anxiety with Recent Panic Symptoms  History of bereavement  History of insomnia  -Wife passed away 2024. Pt has had intermittent exacerbated anxiety symptoms since but have been worse recently. Met with PCP 25, started on low dose lorazepam.  - hold lorazepam for now      Essential hypertension  Hyperlipidemia  Monitor blood pressure and heart rate.   -Continue prior to admission losartan, metoprolol,   - continue with PTA furosemide, follow up with PCP whether to stop vs continue given above urinary symptoms  - resume rosuvastatin      Melena s/p EGD, 25  Anemia, normocytic, acute blood loss Hgb 10.8 > 10.5 > 10.5 > 11.1  Hospital admission for dark stools and concerns of melena, for details see admission note. Not on AC, no previous GIB. + FH (brother  of colon CA).    - GI consulted during previous admission - Dr. Simons's team  - EGD  without any findings to explain melenic stools; biopsies sent for H.pylori                - Colonoscopy  w/ normal ileum, colon normal, internal hemorrhoids.   - Anemia thought to be related to duodenal ulcer, but unclear. Plans to follow up with Kristyn [facility]ASH[facility]URN for possible pill cam. Hgb remained stable. Occult stool testing negative.     Benign prostatic hypertrophy  -Continue tamsulosin     Gastroesophageal reflux disease  - continue protonix 40mg daily      History of herpes simplex virus infection of eyelid  -Continue prior to admission famciclovir and eye drops     History of cognitive impairment  -Continue donepezil     History of mood disorder  -Continue escitalopram      Spoke with pt's niece, Anna. She will be picking him up from the hospital.     Consultations This Hospital Stay   PHYSICAL THERAPY ADULT IP CONSULT  CARE  MANAGEMENT / SOCIAL WORK IP CONSULT    Code Status   No CPR- Do NOT Intubate    Time Spent on this Encounter   I, Lui Steven MD, personally saw the patient today and spent 38 minutes discharging this patient.       Lui Steven MD  [facility] EXTENDED RECOVERY AND SHORT STAY  [address] [address]  Phone: [phone]  ______________________________________________________________________    Physical Exam   Vital Signs: Temp: 98.9  F (37.2  C) Temp src: Oral BP: 119/61 Pulse: 62   Resp: 16 SpO2: 96 % O2 Device: None (Room air)    Weight: 150 lbs .02 oz  General Appearance: Alert, awake and no apparent distress  Respiratory: clear to auscultation bilaterally  Cardiovascular: regular rate and rhythm  GI: soft and non-tender  Skin: warm and dry         Primary Care Physician   Karolyn Aiken    Discharge Orders      Home Care Referral      Reason for your hospital stay    You were admitted to the hospital for pneumonia     Activity    Your activity upon discharge: activity as tolerated     Diet    Follow this diet upon discharge: Current Diet:Orders Placed This Encounter      Room Service      Regular Diet Adult     Hospital Follow-up with Existing Primary Care Provider (PCP)            Significant Results and Procedures   Most Recent 3 CBC's:  Recent Labs   Lab Test 05/13/25  0615 05/11/25  1650 02/13/25  1502 02/13/25  0609   WBC 7.8 9.0  --  4.6   HGB 12.0* 13.1* 11.1* 10.5*   MCV 95 96  --  98    193  --  157     Most Recent 3 BMP's:  Recent Labs   Lab Test 05/13/25  0615 05/11/25  1650 02/13/25  0609    141 142   POTASSIUM 4.0 4.4 4.3   CHLORIDE 102 102 105   CO2 27 26 29   BUN 14.0 18.9 11.4   CR 0.75 0.84 0.85   ANIONGAP 9 13 8   MARITZA 8.9 9.0 8.5*   * 95 91   ,   Results for orders placed or performed during the hospital encounter of 05/11/25   XR Chest 2 Views    Narrative    EXAM: XR CHEST 2 VIEWS  LOCATION: [facility]  DATE: 5/11/2025    INDICATION: Cough, shortness of breath   COMPARISON: 12/31/2024       Impression    IMPRESSION: Patchy area of airspace consolidation suspicious for pneumonia in the left lower lobe. Calcified aortic atherosclerosis. Mild degenerative change thoracic spine.        Discharge Medications   Current Discharge Medication List        START taking these medications    Details   azithromycin (ZITHROMAX) 250 MG tablet Take 1 tablet (250 mg) by mouth daily.  Qty: 2 tablet, Refills: 0    Associated Diagnoses: Pneumonia of left lower lobe due to infectious organism      cefpodoxime (VANTIN) 200 MG tablet Take 1 tablet (200 mg) by mouth 2 times daily for 5 days.  Qty: 10 tablet, Refills: 0    Associated Diagnoses: Pneumonia of left lower lobe due to infectious organism           CONTINUE these medications which have NOT CHANGED    Details   acetaminophen (TYLENOL) 325 MG tablet Take 2 tablets (650 mg) by mouth every 4 hours as needed for mild pain or other (and adjunct with moderate or severe pain or per patient request).    Associated Diagnoses: Dyspnea on exertion      donepezil (ARICEPT) 5 MG tablet Take 1 tablet (5 mg) by mouth every evening.    Associated Diagnoses: Mild dementia associated with other underlying disease, without behavioral disturbance, psychotic disturbance, mood disturbance, or anxiety (H)      escitalopram (LEXAPRO) 10 MG tablet Take 10 mg by mouth at bedtime.      famciclovir (FAMVIR) 500 MG tablet Take 1 tablet (500 mg) by mouth 2 times daily.    Associated Diagnoses: HSV (herpes simplex virus) infection of eyelid      fluorometholone (FLAREX) 0.1 % ophthalmic suspension Place 1 drop into the right eye every morning.    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      LORazepam (ATIVAN) 0.5 MG tablet Take 0.5 mg by mouth daily as needed for anxiety.      losartan  (COZAAR) 25 MG tablet Take 1 tablet (25 mg) by mouth daily.    Associated Diagnoses: Benign essential hypertension      metoprolol succinate ER (TOPROL XL) 25 MG 24 hr tablet Take 1 tablet (25 mg) by mouth daily.    Associated Diagnoses: Benign essential hypertension      pantoprazole (PROTONIX) 40 MG EC tablet Take 1 tablet (40 mg) by mouth daily.  Qty: 30 tablet, Refills: 0    Associated Diagnoses: Anemia, unspecified type      !! polyethylene glycol-propylene glycol (SYSTANE ULTRA) 0.4-0.3 % SOLN ophthalmic solution Place 1 drop Into the left eye every morning.      !! polyethylene glycol-propylene glycol (SYSTANE ULTRA) 0.4-0.3 % SOLN ophthalmic solution Place 1 drop Into the left eye daily as needed for dry eyes.      rosuvastatin (CRESTOR) 10 MG tablet Take 1 tablet (10 mg) by mouth daily.    Associated Diagnoses: Hyperlipidemia LDL goal <130      tamsulosin (FLOMAX) 0.4 MG capsule Take 1 capsule (0.4 mg) by mouth every evening.    Associated Diagnoses: Benign prostatic hyperplasia without lower urinary tract symptoms      tolterodine ER (DETROL LA) 2 MG 24 hr capsule Take 2 mg by mouth every evening.       !! - Potential duplicate medications found. Please discuss with provider.        Allergies   No Known Allergies

## 2025-05-13 NOTE — PLAN OF CARE
Goal Outcome Evaluation:      Plan of Care Reviewed With: patient    Overall Patient Progress: improving       PRIMARY Concern: Here with gen weakness, cough, SOB, admitted for Community acquired pneumonia.  SAFETY RISK Concerns (fall risk, behaviors, etc.): Fall risk      Aggression Tool Color: Green  Isolation/Type: N/A  Tests/Procedures for NEXT shift: AM labs-CBC,BMP  Consults? (Pending/following, signed-off?) PT and SW following  Where is patient from? (Home, TCU, etc.): Home,lives alone  Other Important info for NEXT shift: patient having increased urinary outr  Anticipated DC date & active delays:Home with assist and home PT today  _____________________________________________________________________________  SUMMARY NOTE:   Orientation/Cognitive: AxOx4, forgetful  Observation Goals (Met/ Not Met): Inpt  Mobility Level/Assist Equipment: A1 gb/w  Antibiotics & Plan (IV/po, length of tx left): IV rocephin q24  Pain Management: Denies  Complete Pain Reassessment: Y/N Y   Due next: Next shift  Tele/VS/O2: VSS on RA ex htn  ABNL Lab/BG: See chart  Diet: Reg  Bowel/Bladder: Urinary frequency, ex cath at night. No BM this shift  Skin Concerns: Scattered bruising/scabbing. Dry flaky skin  Drains/Devices: PIV SL  Patient Stated Goal for Today-discharge

## 2025-05-13 NOTE — PROGRESS NOTES
PRIMARY Concern: Community acquired pneumonia, weakness.  SAFETY RISK Concerns (fall risk, behaviors, etc.): Fall risk      Aggression Tool Color: Green  Isolation/Type: N/A  Tests/Procedures for NEXT shift: AM labs  Consults? (Pending/following, signed-off?) PT and SW following  Where is patient from? (Home, TCU, etc.): Home alone  Other Important info for NEXT shift: Patient and family concerned about urinary frequency/urgency, UA is negative.  Anticipated DC date & active delays: 5/13?  _____________________________________________________________________________  SUMMARY NOTE:   Orientation/Cognitive: AxOx4, forgetful  Observation Goals (Met/ Not Met): Inpt  Mobility Level/Assist Equipment: A1 gb/w  Antibiotics & Plan (IV/po, length of tx left): IV rocephin q24  Pain Management: Denies  Complete Pain Reassessment: Y/N Y   Due next: Next shift  Tele/VS/O2: VSS on RA ex htn  ABNL Lab/BG: See chart  Diet: Reg  Bowel/Bladder: Urinary frequency, ex cath at night. No BM this shift  Skin Concerns: Scattered bruising/scabbing. Dry flaky skin  Drains/Devices: PIV SL  Patient Stated Goal for Today: To go home

## 2025-05-13 NOTE — PLAN OF CARE
Physical Therapy Discharge Summary    Reason for therapy discharge:    Discharged to home with home therapy.    Progress towards therapy goal(s). See goals on Care Plan in [facility] electronic health record for goal details.  Goals partially met.  Barriers to achieving goals:   discharge from facility.    Therapy recommendation(s):    Continued therapy is recommended.  Rationale/Recommendations: Anticipate Pt near baseline mobility. Limited by activity tolerance and weakness. Currently ambulating far distances and completing stairs w/ 4WW and SBA. Anticipate when medically ready Pt can DC to home w/ assist from nieces as needed and HHPT.  PT Brief overview of current status: Goals of therapy will be to address safe mobility and make recs for d/c to next level of care. Pt and RN will continue to follow all falls risk precautions as documented by RN staff while hospitalized  PT Total Distance Amb During Session (feet): 500    Recommendation above provided by last treating therapist.

## 2025-05-13 NOTE — PLAN OF CARE
Goal Outcome Evaluation:      Plan of Care Reviewed With: patient    PRIMARY Concern: Gen weakness, cough, SOB, admitted for Community acquired pneumonia.  SAFETY RISK Concerns (fall risk, behaviors, etc.): Fall risk      Aggression Tool Color: Green  Isolation/Type: N/A  Tests/Procedures for NEXT shift: AM labs-CBC,BMP  Consults? (Pending/following, signed-off?) PT rec home with home care PT and SW following  Where is patient from? (Home, TCU, etc.): Home,lives alone  Other Important info for NEXT shift: patient having increased urinary outr  Anticipated DC date & active delays: Possible discharge today with home with assist and home PT today  _____________________________________________________________________________  SUMMARY NOTE:   Orientation/Cognitive: AxOx4, forgetful  Observation Goals (Met/ Not Met): Inpt  Mobility Level/Assist Equipment: A1 gb/w. Sat up in chair for meal.  Antibiotics & Plan (IV/po, length of tx left): IV rocephin q24 switch to po antibiotics.  Pain Management: Denies  Complete Pain Reassessment: Y/N Y   Due next: Next shift  Tele/VS/O2: VSS on RA ex htn  ABNL Lab/BG: See chart  Diet: Reg  Bowel/Bladder: Urinary frequency. No BM this shift  Skin Concerns: Scattered bruising/scabbing. Dry flaky skin  Drains/Devices: PIV SL  Patient Stated Goal for Today-discharge    AVS printed and reviewed with the pt. PIV removed. Discharge meds given and went over. Care coordinator sent Home care referral. Darinel Aguiar is coming for ride home. Pt discharged at 1630.

## 2025-05-13 NOTE — PROGRESS NOTES
Care Management Discharge Note    Discharge Date: 05/13/2025       Discharge Disposition: Home, Home Care    Discharge Services:      Discharge DME: None    Discharge Transportation: family or friend will provide    Private pay costs discussed: Not applicable    Does the patient's insurance plan have a 3 day qualifying hospital stay waiver?  No    PAS Confirmation Code:    Patient/family educated on Medicare website which has current facility and service quality ratings:      Education Provided on the Discharge Plan: Yes  Persons Notified of Discharge Plans: bedside RN  Patient/Family in Agreement with the Plan: yes    Handoff Referral Completed: Yes, [facility] PCP: Internal handoff referral completed    Additional Information:  Patient was accepted by [facility], SOC would be out 4 days from today. They will let patient know when they are able to start.    Rae Baldwin RN

## 2025-05-14 ENCOUNTER — HOSPITAL ENCOUNTER (INPATIENT)
Facility: CLINIC | Age: OVER 89
End: 2025-05-14
Attending: EMERGENCY MEDICINE | Admitting: STUDENT IN AN ORGANIZED HEALTH CARE EDUCATION/TRAINING PROGRAM
Payer: MEDICARE

## 2025-05-14 ENCOUNTER — PATIENT OUTREACH (OUTPATIENT)
Dept: CARE COORDINATION | Facility: CLINIC | Age: OVER 89
End: 2025-05-14

## 2025-05-14 ENCOUNTER — APPOINTMENT (OUTPATIENT)
Dept: GENERAL RADIOLOGY | Facility: CLINIC | Age: OVER 89
End: 2025-05-14
Attending: EMERGENCY MEDICINE
Payer: MEDICARE

## 2025-05-14 DIAGNOSIS — R42 ORTHOSTATIC LIGHTHEADEDNESS: ICD-10-CM

## 2025-05-14 DIAGNOSIS — R53.1 GENERALIZED WEAKNESS: ICD-10-CM

## 2025-05-14 DIAGNOSIS — N17.9 AKI (ACUTE KIDNEY INJURY): ICD-10-CM

## 2025-05-14 LAB
ALBUMIN UR-MCNC: NEGATIVE MG/DL
ANION GAP SERPL CALCULATED.3IONS-SCNC: 10 MMOL/L (ref 7–15)
APPEARANCE UR: CLEAR
BASE EXCESS BLDV CALC-SCNC: 3 MMOL/L (ref -3–3)
BASOPHILS # BLD AUTO: 0.1 10E3/UL (ref 0–0.2)
BASOPHILS NFR BLD AUTO: 1 %
BILIRUB UR QL STRIP: NEGATIVE
BUN SERPL-MCNC: 22.5 MG/DL (ref 8–23)
CALCIUM SERPL-MCNC: 8.6 MG/DL (ref 8.8–10.4)
CHLORIDE SERPL-SCNC: 104 MMOL/L (ref 98–107)
COLOR UR AUTO: ABNORMAL
CREAT SERPL-MCNC: 1.29 MG/DL (ref 0.67–1.17)
EGFRCR SERPLBLD CKD-EPI 2021: 53 ML/MIN/1.73M2
EOSINOPHIL # BLD AUTO: 0.1 10E3/UL (ref 0–0.7)
EOSINOPHIL NFR BLD AUTO: 1 %
ERYTHROCYTE [DISTWIDTH] IN BLOOD BY AUTOMATED COUNT: 12.9 % (ref 10–15)
GLUCOSE SERPL-MCNC: 100 MG/DL (ref 70–99)
GLUCOSE UR STRIP-MCNC: NEGATIVE MG/DL
HCO3 BLDV-SCNC: 29 MMOL/L (ref 21–28)
HCO3 SERPL-SCNC: 25 MMOL/L (ref 22–29)
HCT VFR BLD AUTO: 33.8 % (ref 40–53)
HGB BLD-MCNC: 11.1 G/DL (ref 13.3–17.7)
HGB UR QL STRIP: NEGATIVE
HYALINE CASTS: 3 /LPF
IMM GRANULOCYTES # BLD: 0.1 10E3/UL
IMM GRANULOCYTES NFR BLD: 1 %
KETONES UR STRIP-MCNC: NEGATIVE MG/DL
LACTATE BLD-SCNC: 1.2 MMOL/L (ref 0.7–2)
LEUKOCYTE ESTERASE UR QL STRIP: NEGATIVE
LYMPHOCYTES # BLD AUTO: 0.9 10E3/UL (ref 0.8–5.3)
LYMPHOCYTES NFR BLD AUTO: 9 %
MCH RBC QN AUTO: 32.3 PG (ref 26.5–33)
MCHC RBC AUTO-ENTMCNC: 32.8 G/DL (ref 31.5–36.5)
MCV RBC AUTO: 98 FL (ref 78–100)
MONOCYTES # BLD AUTO: 0.7 10E3/UL (ref 0–1.3)
MONOCYTES NFR BLD AUTO: 7 %
NEUTROPHILS # BLD AUTO: 8.3 10E3/UL (ref 1.6–8.3)
NEUTROPHILS NFR BLD AUTO: 83 %
NITRATE UR QL: NEGATIVE
NRBC # BLD AUTO: 0 10E3/UL
NRBC BLD AUTO-RTO: 0 /100
PCO2 BLDV: 54 MM HG (ref 40–50)
PH BLDV: 7.34 [PH] (ref 7.32–7.43)
PH UR STRIP: 5 [PH] (ref 5–7)
PLATELET # BLD AUTO: 160 10E3/UL (ref 150–450)
PO2 BLDV: 23 MM HG (ref 25–47)
POTASSIUM SERPL-SCNC: 4.2 MMOL/L (ref 3.4–5.3)
RBC # BLD AUTO: 3.44 10E6/UL (ref 4.4–5.9)
RBC URINE: <1 /HPF
SAO2 % BLDV: 36 % (ref 70–75)
SODIUM SERPL-SCNC: 139 MMOL/L (ref 135–145)
SP GR UR STRIP: 1.01 (ref 1–1.03)
UROBILINOGEN UR STRIP-MCNC: NORMAL MG/DL
WBC # BLD AUTO: 10.1 10E3/UL (ref 4–11)
WBC URINE: 1 /HPF

## 2025-05-14 PROCEDURE — 258N000003 HC RX IP 258 OP 636: Performed by: PHYSICIAN ASSISTANT

## 2025-05-14 PROCEDURE — 71046 X-RAY EXAM CHEST 2 VIEWS: CPT

## 2025-05-14 PROCEDURE — 85025 COMPLETE CBC W/AUTO DIFF WBC: CPT | Performed by: EMERGENCY MEDICINE

## 2025-05-14 PROCEDURE — 80048 BASIC METABOLIC PNL TOTAL CA: CPT | Performed by: EMERGENCY MEDICINE

## 2025-05-14 PROCEDURE — 258N000003 HC RX IP 258 OP 636: Performed by: EMERGENCY MEDICINE

## 2025-05-14 PROCEDURE — G0378 HOSPITAL OBSERVATION PER HR: HCPCS

## 2025-05-14 PROCEDURE — 99285 EMERGENCY DEPT VISIT HI MDM: CPT | Mod: 25

## 2025-05-14 PROCEDURE — 36415 COLL VENOUS BLD VENIPUNCTURE: CPT | Performed by: EMERGENCY MEDICINE

## 2025-05-14 PROCEDURE — 81001 URINALYSIS AUTO W/SCOPE: CPT | Performed by: EMERGENCY MEDICINE

## 2025-05-14 PROCEDURE — 96360 HYDRATION IV INFUSION INIT: CPT

## 2025-05-14 PROCEDURE — 83605 ASSAY OF LACTIC ACID: CPT

## 2025-05-14 PROCEDURE — 99222 1ST HOSP IP/OBS MODERATE 55: CPT | Performed by: PHYSICIAN ASSISTANT

## 2025-05-14 PROCEDURE — 250N000013 HC RX MED GY IP 250 OP 250 PS 637: Performed by: PHYSICIAN ASSISTANT

## 2025-05-14 RX ORDER — ACETAMINOPHEN 325 MG/1
650 TABLET ORAL EVERY 4 HOURS PRN
Status: DISCONTINUED | OUTPATIENT
Start: 2025-05-14 | End: 2025-05-14

## 2025-05-14 RX ORDER — VALACYCLOVIR HYDROCHLORIDE 1 G/1
1000 TABLET, FILM COATED ORAL EVERY 12 HOURS SCHEDULED
Status: DISCONTINUED | OUTPATIENT
Start: 2025-05-14 | End: 2025-05-18

## 2025-05-14 RX ORDER — AZITHROMYCIN 250 MG/1
250 TABLET, FILM COATED ORAL DAILY
Status: COMPLETED | OUTPATIENT
Start: 2025-05-15 | End: 2025-05-15

## 2025-05-14 RX ORDER — TAMSULOSIN HYDROCHLORIDE 0.4 MG/1
0.4 CAPSULE ORAL EVERY EVENING
Status: DISCONTINUED | OUTPATIENT
Start: 2025-05-14 | End: 2025-05-19 | Stop reason: HOSPADM

## 2025-05-14 RX ORDER — ONDANSETRON 4 MG/1
4 TABLET, ORALLY DISINTEGRATING ORAL EVERY 6 HOURS PRN
Status: DISCONTINUED | OUTPATIENT
Start: 2025-05-14 | End: 2025-05-19 | Stop reason: HOSPADM

## 2025-05-14 RX ORDER — FLUOROMETHOLONE 1 MG/ML
1 SUSPENSION/ DROPS OPHTHALMIC EVERY MORNING
Status: DISCONTINUED | OUTPATIENT
Start: 2025-05-15 | End: 2025-05-19 | Stop reason: HOSPADM

## 2025-05-14 RX ORDER — DONEPEZIL HYDROCHLORIDE 5 MG/1
5 TABLET, FILM COATED ORAL EVERY EVENING
Status: DISCONTINUED | OUTPATIENT
Start: 2025-05-14 | End: 2025-05-19 | Stop reason: HOSPADM

## 2025-05-14 RX ORDER — PANTOPRAZOLE SODIUM 40 MG/1
40 TABLET, DELAYED RELEASE ORAL DAILY
Status: DISCONTINUED | OUTPATIENT
Start: 2025-05-15 | End: 2025-05-19 | Stop reason: HOSPADM

## 2025-05-14 RX ORDER — CEFPODOXIME PROXETIL 200 MG/1
200 TABLET, FILM COATED ORAL 2 TIMES DAILY
Status: COMPLETED | OUTPATIENT
Start: 2025-05-14 | End: 2025-05-18

## 2025-05-14 RX ORDER — AMOXICILLIN 250 MG
2 CAPSULE ORAL 2 TIMES DAILY PRN
Status: DISCONTINUED | OUTPATIENT
Start: 2025-05-14 | End: 2025-05-19 | Stop reason: HOSPADM

## 2025-05-14 RX ORDER — ONDANSETRON 2 MG/ML
4 INJECTION INTRAMUSCULAR; INTRAVENOUS EVERY 6 HOURS PRN
Status: DISCONTINUED | OUTPATIENT
Start: 2025-05-14 | End: 2025-05-19 | Stop reason: HOSPADM

## 2025-05-14 RX ORDER — ACETAMINOPHEN 650 MG/1
650 SUPPOSITORY RECTAL EVERY 4 HOURS PRN
Status: DISCONTINUED | OUTPATIENT
Start: 2025-05-14 | End: 2025-05-19 | Stop reason: HOSPADM

## 2025-05-14 RX ORDER — SODIUM CHLORIDE 9 MG/ML
INJECTION, SOLUTION INTRAVENOUS CONTINUOUS
Status: DISCONTINUED | OUTPATIENT
Start: 2025-05-14 | End: 2025-05-14

## 2025-05-14 RX ORDER — ESCITALOPRAM OXALATE 10 MG/1
10 TABLET ORAL AT BEDTIME
Status: DISCONTINUED | OUTPATIENT
Start: 2025-05-14 | End: 2025-05-19 | Stop reason: HOSPADM

## 2025-05-14 RX ORDER — ACETAMINOPHEN 325 MG/1
650 TABLET ORAL EVERY 4 HOURS PRN
Status: DISCONTINUED | OUTPATIENT
Start: 2025-05-14 | End: 2025-05-19 | Stop reason: HOSPADM

## 2025-05-14 RX ORDER — METOPROLOL SUCCINATE 25 MG/1
25 TABLET, EXTENDED RELEASE ORAL DAILY
Status: DISCONTINUED | OUTPATIENT
Start: 2025-05-15 | End: 2025-05-19 | Stop reason: HOSPADM

## 2025-05-14 RX ORDER — AMOXICILLIN 250 MG
1 CAPSULE ORAL 2 TIMES DAILY PRN
Status: DISCONTINUED | OUTPATIENT
Start: 2025-05-14 | End: 2025-05-19 | Stop reason: HOSPADM

## 2025-05-14 RX ORDER — PROCHLORPERAZINE MALEATE 5 MG/1
5 TABLET ORAL EVERY 6 HOURS PRN
Status: DISCONTINUED | OUTPATIENT
Start: 2025-05-14 | End: 2025-05-19 | Stop reason: HOSPADM

## 2025-05-14 RX ORDER — TOLTERODINE 2 MG/1
2 CAPSULE, EXTENDED RELEASE ORAL EVERY EVENING
Status: DISCONTINUED | OUTPATIENT
Start: 2025-05-14 | End: 2025-05-19 | Stop reason: HOSPADM

## 2025-05-14 RX ADMIN — TOLTERODINE 2 MG: 2 CAPSULE, EXTENDED RELEASE ORAL at 21:56

## 2025-05-14 RX ADMIN — SODIUM CHLORIDE: 9 INJECTION, SOLUTION INTRAVENOUS at 19:00

## 2025-05-14 RX ADMIN — ESCITALOPRAM OXALATE 10 MG: 10 TABLET ORAL at 21:56

## 2025-05-14 RX ADMIN — VALACYCLOVIR HYDROCHLORIDE 1000 MG: 1 TABLET, FILM COATED ORAL at 21:56

## 2025-05-14 RX ADMIN — SODIUM CHLORIDE 1000 ML: 0.9 INJECTION, SOLUTION INTRAVENOUS at 12:06

## 2025-05-14 RX ADMIN — CEFPODOXIME PROXETIL 200 MG: 200 TABLET, FILM COATED ORAL at 21:56

## 2025-05-14 RX ADMIN — DONEPEZIL HYDROCHLORIDE 5 MG: 5 TABLET, FILM COATED ORAL at 21:56

## 2025-05-14 ASSESSMENT — ACTIVITIES OF DAILY LIVING (ADL)
ADLS_ACUITY_SCORE: 54
ADLS_ACUITY_SCORE: 73
ADLS_ACUITY_SCORE: 54
ADLS_ACUITY_SCORE: 73
ADLS_ACUITY_SCORE: 54
ADLS_ACUITY_SCORE: 67
ADLS_ACUITY_SCORE: 54
ADLS_ACUITY_SCORE: 73
ADLS_ACUITY_SCORE: 54
ADLS_ACUITY_SCORE: 54
ADLS_ACUITY_SCORE: 73

## 2025-05-14 NOTE — ED NOTES
Bed: ED28  Expected date:   Expected time:   Means of arrival:   Comments:  [facility] [address] 89 M weakness/pneumonia eta 0921

## 2025-05-14 NOTE — ED NOTES
Assisted pt with orthostatics; pt unable to stand without feeling as though he was going to fall.  Pt assisted back to a seated position on the bed.  MD updated.  Lying down, pt BP was 149/75 and HR was 65.  Standing and sitting was 116/68 with a HR of 75.  Pt also changed at this time;  he had on depend that he left the hospital in yesterday.

## 2025-05-14 NOTE — PROGRESS NOTES
RECEIVING UNIT ED HANDOFF REVIEW    ED Nurse Handoff Report was reviewed by: Nanda David RN on May 14, 2025 at 5:26 PM

## 2025-05-14 NOTE — PROGRESS NOTES
[facility] Care Resource Center: [facility]    Background: Transitional Care Management program identified per system criteria and reviewed by [facility] Resource [place] team for possible outreach.    Assessment: Upon chart review, [facility] Team member will not proceed with patient outreach related to this episode of Transitional Care Management program due to reason below:    Patient has presented to Emergency Department, been readmitted to hospital, or transferred to another hospital.    Plan: Transitional Care Management episode addressed appropriately per reason noted above.      Dorothea Urbina  Community Health Worker  [facility]  [phone]     *Connected Care Resource Team does NOT follow patient ongoing. Referrals are identified based on internal discharge reports and the outreach is to ensure patient has an understanding of their discharge instructions.

## 2025-05-14 NOTE — H&P
[facility]    History and Physical - Hospitalist Service       Date of Admission:  5/14/2025    Assessment & Plan      Riki Alvarez is a 89 year old male with a past medical history significant for hypertension, hyperlipidemia, cognitive impairment, gastroesophageal reflux disease, benign prostatic hypertrophy, mood disorder, recent admission for CAP admitted on 5/14/2025 after presenting with generalized weakness.     Generalized weakness   Orthostatic hypotension  During recent admission to hospital was evaluated by PT who recommended home with family assist and home PT/RN. Once he returned home he felt increasingly weak and was unable to ambulate without family assist. Ultimately he could not get out of bed on his own and he returned to the ED.   *orthostatics ED positive with drop from 149/75> 116/68 with HR 65>75 with associated symptoms   --continue abx for pneumonia  --encourage po intake   --IVF   --hold BP meds, flomax today   --PT consult  --SW consulted, pt lives independently PTA. Home care was ordered on discharge but had not yet established care.     LADY  Suspect hypovolemia as he has had poor po intake. Cr on discharge 5/13 was 0.75 and is now 1.29 on admission   --s/p 1L bolus in the ED, continue maintenance for additional 500cc  --hold PTA losartan, furosemide  --BMP in am     Community acquired pneumonia  Recently admitted to [facility] from 5/11/25-5/13/25 after presenting with cough and weakness x1 week.   Received ceftriaxone and azithromycin in the hospital. He remained afebrile without leukocytosis and did not require oxygen   He was discharged on Vantin BID and azithromycin to complete course   Symptoms improved at time of discharge and are minimal at this time.  On admission he is afebrile with stable oxygen sats and normal WBC  --continue po Vantin and azithromycin to complete course    Anxiety with Recent Panic Symptoms  History of bereavement  History of  insomnia  Wife passed away 2024. Pt has had intermittent exacerbated anxiety symptoms since but have been worse recently. Met with PCP 25, started on low dose lorazepam.  - hold PTA lorazepam given fall risk, weakness      Essential hypertension  Hyperlipidemia  PTA: furosemide 10mg daily, losartan 25mg daily, metoprolol XL 25mg daily   --hold PTA medications for now given orthostatic hypotension and LADY, can resume BB later today if BP elevated   - resume rosuvastatin at discharge      Anemia, normocytic  Hx melena s/p EGD, 25  Hospital admission in February for dark stools and concerns of melena, for details see admission note. Not on AC, no previous GIB. + FH (brother  of colon CA).    - GI consulted during previous admission - Dr. Simons's team  - EGD  without any findings to explain melenic stools; biopsies sent for H.pylori                - Colonoscopy  w/ normal ileum, colon normal, internal hemorrhoids.   - Anemia thought to be related to duodenal ulcer, but unclear. Plans to follow up with Kristyn [facility] for possible pill cam. Hgb remained stable. Occult stool testing negative.  --hgb during recent admission 13>12. Present admission 11.1. no overt bleeding but will need to be followed given orthostasis/weakness     Overactive Bladder  Follows with Dr. Butler (urology). Currently on Detrol, but still having urinary frequency, leaking. These symptoms are making it difficult to leave the house.   - recommended follow up with Urology outpatient      Benign prostatic hypertrophy  -hold  tamsulosin  given orthostatic hypotension, resume as able      Gastroesophageal reflux disease  - continue protonix 40mg daily      History of herpes simplex virus infection of eyelid  -Continue prior to admission famciclovir and eye drops     History of cognitive impairment  -Continue donepezil     History of mood disorder  -Continue escitalopram        Diet:  regular diet   DVT Prophylaxis:  Pneumatic Compression Devices  Garcia Catheter: Not present  Lines: None     Cardiac Monitoring: None  Code Status:  DNR/DNI    Clinically Significant Risk Factors Present on Admission                  # Acute Kidney Injury, unspecified: based on a >150% or 0.3 mg/dL increase in last creatinine compared to past 90 day average, will monitor renal function  # Hypertension: Noted on problem list     # Dementia: noted on problem list           # Financial/Environmental Concerns:           Disposition Plan     Medically Ready for Discharge: Anticipated Tomorrow         The patient's care was discussed with Dr. Steven who agrees with the above plan     Josephine Carney PA-C  Hospitalist Service  [facility]  Securely message with [employer] (more info)  Text page via [facility] Paging/Directory     ______________________________________________________________________    Chief Complaint   Weakness     History is obtained from the patient    History of Present Illness   Riki Alvarez is a 89 year old male with a past medical history significant for hypertension, hyperlipidemia, cognitive impairment, gastroesophageal reflux disease, benign prostatic hypertrophy, mood disorder, recent admission for CAP admitted on 5/14/2025 after presenting with generalized weakness.   Patient was recently admitted at [facility] from 5/11/2025 through 5/13/2025 after presenting with a week of generalized weakness and cough.  He was started on IV ceftriaxone and azithromycin did not have oxygen needs during his admission.  He was evaluated by physical therapy and was able to ambulate and complete stairs with staff.  He was discharged home on oral antibiotics.  He reports he was seemingly doing ok in the hospital but when he got into his home he was very weak and unable to ambulate without family support. They got him into bed and he was unable to ambulate to the bathroom overnight. This am he  scooted to the edge of the bed but knew if he tried to stand he would fall. He returns to the ED for evaluation.  He denies SOB and reports cough continues to improve. No chest pain. No nausea, vomiting. Denies bleeding. He feels he has been eating and drinking but niece reports poor appetite and difficulty with po intake. She feels patient is not safe to go home and has been looking into rehab at [facility]. He is frustrated with this but understands he is unable to care for himself currently.     Past Medical History    Past Medical History:   Diagnosis Date    Anxiety     BPH (benign prostatic hyperplasia)     Hiatal hernia     Hyperlipidemia     Hypertension     Mild dementia (H)        Past Surgical History   Past Surgical History:   Procedure Laterality Date    COLONOSCOPY N/A 2/14/2025    Procedure: Colonoscopy;  Surgeon: Joyce Medrano MD;  Location:  GI    ESOPHAGOSCOPY, GASTROSCOPY, DUODENOSCOPY (EGD), COMBINED N/A 2/13/2025    Procedure: Esophagoscopy, gastroscopy, duodenoscopy (EGD), combined;  Surgeon: Fidel Simons MD;  Location:  GI       Prior to Admission Medications   Prior to Admission Medications   Prescriptions Last Dose Informant Patient Reported? Taking?   LORazepam (ATIVAN) 0.5 MG tablet  Self, Daughter Yes Yes   Sig: Take 0.5 mg by mouth daily as needed for anxiety.   acetaminophen (TYLENOL) 325 MG tablet  Self, Daughter No Yes   Sig: Take 2 tablets (650 mg) by mouth every 4 hours as needed for mild pain or other (and adjunct with moderate or severe pain or per patient request).   azithromycin (ZITHROMAX) 250 MG tablet   No Yes   Sig: Take 1 tablet (250 mg) by mouth daily.   cefpodoxime (VANTIN) 200 MG tablet   No Yes   Sig: Take 1 tablet (200 mg) by mouth 2 times daily for 5 days.   donepezil (ARICEPT) 5 MG tablet 5/13/2025 Self, Daughter No Yes   Sig: Take 1 tablet (5 mg) by mouth every evening.   escitalopram (LEXAPRO) 10 MG tablet 5/13/2025 Self, Daughter Yes Yes    Sig: Take 10 mg by mouth at bedtime.   famciclovir (FAMVIR) 500 MG tablet 5/14/2025 Morning Self, Daughter No Yes   Sig: Take 1 tablet (500 mg) by mouth 2 times daily.   fluorometholone (FLAREX) 0.1 % ophthalmic suspension 5/14/2025 Self, Daughter No Yes   Sig: Place 1 drop into the right eye every morning.   furosemide (LASIX) 20 MG tablet 5/14/2025 Self, Daughter Yes Yes   Sig: Take 10 mg by mouth every morning.   losartan (COZAAR) 25 MG tablet 5/14/2025 Self, Daughter No Yes   Sig: Take 1 tablet (25 mg) by mouth daily.   metoprolol succinate ER (TOPROL XL) 25 MG 24 hr tablet 5/14/2025 Self, Daughter No Yes   Sig: Take 1 tablet (25 mg) by mouth daily.   pantoprazole (PROTONIX) 40 MG EC tablet 5/14/2025 Self, Daughter No Yes   Sig: Take 1 tablet (40 mg) by mouth daily.   polyethylene glycol-propylene glycol (SYSTANE ULTRA) 0.4-0.3 % SOLN ophthalmic solution 5/14/2025 Self, Daughter Yes Yes   Sig: Place 1 drop Into the left eye every morning.   polyethylene glycol-propylene glycol (SYSTANE ULTRA) 0.4-0.3 % SOLN ophthalmic solution  Self, Daughter Yes Yes   Sig: Place 1 drop Into the left eye daily as needed for dry eyes.   rosuvastatin (CRESTOR) 10 MG tablet 5/14/2025 Self, Daughter No Yes   Sig: Take 1 tablet (10 mg) by mouth daily.   tamsulosin (FLOMAX) 0.4 MG capsule 5/13/2025 Self, Daughter No Yes   Sig: Take 1 capsule (0.4 mg) by mouth every evening.   tolterodine ER (DETROL LA) 2 MG 24 hr capsule 5/13/2025 Self, Daughter Yes Yes   Sig: Take 2 mg by mouth every evening.      Facility-Administered Medications: None        Social History   I have reviewed this patient's social history and updated it with pertinent information if needed.  Social History     Tobacco Use    Smoking status: Former     Types: Cigarettes    Smokeless tobacco: Never   Substance Use Topics    Alcohol use: Not Currently    Drug use: Never        Physical Exam   Temp: 98  F (36.7  C)   BP: 139/67 Pulse: 65   Resp: 18 SpO2: 98 %       There were no vitals filed for this visit.  Vital Signs with Ranges  Temp:  [98  F (36.7  C)] 98  F (36.7  C)  Pulse:  [65] 65  Resp:  [18] 18  BP: (139)/(67) 139/67  SpO2:  [98 %] 98 %  No intake/output data recorded.    Constitutional: Alert and oriented to place, situation. Appears comfortable and is appropriately conversant. Non toxic appearing   ENT:  moist mucous membranes  Respiratory: Lungs clear to auscultation bilaterally, no increased work of breathing or wheezing   Cardiovascular: Regular rate and rhythm, no LE edema   GI: active bowel sounds, abdomen soft, non-tender  MSK: moves all four extremities. Dorsi and plantar flexion intact and qual.  strength 5/5 and equal.  Neuro: speech is clear. Face symmetric. Follows commands       Medical Decision Making       50 MINUTES SPENT BY ME on the date of service doing chart review, history, exam, documentation & further activities per the note.      Data     I have personally reviewed the following data over the past 24 hrs:    10.1  \   11.1 (L)   / 160     139 104 22.5 /  100 (H)   4.2 25 1.29 (H) \     Procal: N/A CRP: N/A Lactic Acid: 1.2         Imaging results reviewed over the past 24 hrs:   No results found for this or any previous visit (from the past 24 hours).

## 2025-05-14 NOTE — PHARMACY-ADMISSION MEDICATION HISTORY
Pharmacist Admission Medication History    Admission medication history is complete. The information provided in this note is only as accurate as the sources available at the time of the update.    Information Source(s): Patient and Hospital records via in-person    Pertinent Information: Pt was discharged yesterday 5/13/25. Was discharged with 2 days of Azithromycin and 5 days of Cefpodoxime. Pt took one of the abx this morning but unsure of which antibiotic he took.     Changes made to PTA medication list:  Added: None  Deleted: None  Changed: None    Allergies reviewed with patient and updates made in EHR: no    Medication History Completed By: Janeth Ko [facility] 5/14/2025 11:41 AM    PTA Med List   Medication Sig Last Dose/Taking    acetaminophen (TYLENOL) 325 MG tablet Take 2 tablets (650 mg) by mouth every 4 hours as needed for mild pain or other (and adjunct with moderate or severe pain or per patient request). Taking As Needed    azithromycin (ZITHROMAX) 250 MG tablet Take 1 tablet (250 mg) by mouth daily. Taking    cefpodoxime (VANTIN) 200 MG tablet Take 1 tablet (200 mg) by mouth 2 times daily for 5 days. Taking    donepezil (ARICEPT) 5 MG tablet Take 1 tablet (5 mg) by mouth every evening. 5/13/2025    escitalopram (LEXAPRO) 10 MG tablet Take 10 mg by mouth at bedtime. 5/13/2025    famciclovir (FAMVIR) 500 MG tablet Take 1 tablet (500 mg) by mouth 2 times daily. 5/14/2025 Morning    fluorometholone (FLAREX) 0.1 % ophthalmic suspension Place 1 drop into the right eye every morning. 5/14/2025    furosemide (LASIX) 20 MG tablet Take 10 mg by mouth every morning. 5/14/2025    LORazepam (ATIVAN) 0.5 MG tablet Take 0.5 mg by mouth daily as needed for anxiety. Taking As Needed    losartan (COZAAR) 25 MG tablet Take 1 tablet (25 mg) by mouth daily. 5/14/2025    metoprolol succinate ER (TOPROL XL) 25 MG 24 hr tablet Take 1 tablet (25 mg) by mouth daily. 5/14/2025    pantoprazole (PROTONIX) 40 MG EC tablet Take 1  tablet (40 mg) by mouth daily. 5/14/2025    polyethylene glycol-propylene glycol (SYSTANE ULTRA) 0.4-0.3 % SOLN ophthalmic solution Place 1 drop Into the left eye every morning. 5/14/2025    polyethylene glycol-propylene glycol (SYSTANE ULTRA) 0.4-0.3 % SOLN ophthalmic solution Place 1 drop Into the left eye daily as needed for dry eyes. Taking As Needed    rosuvastatin (CRESTOR) 10 MG tablet Take 1 tablet (10 mg) by mouth daily. 5/14/2025    tamsulosin (FLOMAX) 0.4 MG capsule Take 1 capsule (0.4 mg) by mouth every evening. 5/13/2025    tolterodine ER (DETROL LA) 2 MG 24 hr capsule Take 2 mg by mouth every evening. 5/13/2025

## 2025-05-14 NOTE — ED TRIAGE NOTES
"Pt presents to the ED via EMS with worsening generalized weakness.  Pt lives independently at home at baseline.  Pt states \" If I would have tried to walk, I would have fallen.\"  Pt diagnosed with pneumonia on 5/11/25 and admitted to [facility].  Was discharged yesterday.     Triage Assessment (Adult)       Row Name 05/14/25 0951          Triage Assessment    Airway WDL WDL        Respiratory WDL    Respiratory WDL WDL        Skin Circulation/Temperature WDL    Skin Circulation/Temperature WDL WDL        Cardiac WDL    Cardiac WDL WDL        Peripheral/Neurovascular WDL    Peripheral Neurovascular WDL WDL                     "

## 2025-05-14 NOTE — ED NOTES
[facility]  ED Nurse Handoff Report    ED Chief complaint: Generalized Weakness      ED Diagnosis:   Final diagnoses:   None       Code Status: DNR / DNI    Allergies: No Known Allergies    Patient Story: Pt here d/t increased weakness after being discharged yesterday.  Focused Assessment:  Alert and oriented.  Pt states that he felt immediately weak upon discharge yesterday.  Was unable to make it to the restroom after being discharged and had been utilizing the same depend.  Pt unable to tolerate standing for more than a few seconds in ED d/t feeling lightheaded; assist of 2.  Orthostatic in ED.    Treatments and/or interventions provided:   Patient's response to treatments and/or interventions:     To be done/followed up on inpatient unit:  See epic    Does this patient have any cognitive concerns?:     Activity level - Baseline/Home:  Independent  Activity Level - Current:   Stand with assist x2    Patient's Preferred language: English   Needed?: No    Isolation: None  Infection: Not Applicable  Patient tested for COVID 19 prior to admission: NO  Bariatric?: No    Vital Signs:   Vitals:    05/14/25 0951   BP: 139/67   Pulse: 65   Resp: 18   Temp: 98  F (36.7  C)   SpO2: 98%       Cardiac Rhythm:     Was the PSS-3 completed:   Yes  What interventions are required if any?               Family Comments:   OBS brochure/video discussed/provided to patient/family:               Name of person given brochure if not patient:               Relationship to patient:     For the majority of the shift this patient's behavior was Green.   Behavioral interventions performed were .    ED NURSE PHONE NUMBER: [id number]

## 2025-05-14 NOTE — ED PROVIDER NOTES
Emergency Department Note      History of Present Illness     Chief Complaint   Generalized Weakness      HPI   Riki Alvarez is a 89 year old male with a history of HTN and mild dementia presenting to the ED for generalized weakness. He reports increased weakness since coming home after being discharged from the hospital yesterday. When he woke up this morning, he felt as if he would fall if he were to walk and felt lightheaded when he stood up. He currently uses a walker to ambulate and was scheduled for at-home physical therapy following discharge. He has taken his Azithromycin this morning. Denies falls, chest pain, headache, shortness of breath, issues drinking and eating, or focal weakness.    Independent Historian   None    Review of External Notes   Discharge note from 5/11/25, where patient was seen for pneumonia.     Past Medical History     Medical History and Problem List   Anxiety  BPH  Hiatal hernia  HLD  HTN  Mild dementia   Pneumonia  Anemia     Medications   Zithromax  Vantin   Aricept  Famvir  Desyrel  Cozaar  Toprol  Protonix  Crestor  Flomax  Lexapro  Lasix  Ativan   Detrol LA     Surgical History   Colonoscopy   EGD  Fistulotomy  Targis prostate  Biopsy prostate     Physical Exam     Patient Vitals for the past 24 hrs:   BP Temp Pulse Resp SpO2   05/14/25 0951 139/67 98  F (36.7  C) 65 18 98 %     Physical Exam  Constitutional: Well developed, nontox appearance  Head: Atraumatic.   Neck:  no stridor  Eyes: no scleral icterus  Cardiovascular: RRR, 2+ bilat radial pulses  Pulmonary/Chest: nml resp effort, mild Rales in left lower lung field  Abdominal: ND, soft, NT, no rebound or guarding   Ext: Warm, well perfused, no edema  Neurological: A&O, symmetric facies, moves ext x4  Skin: Skin is warm and dry.   Psychiatric: Behavior is normal. Thought content normal.   Nursing note and vitals reviewed.      Diagnostics     Lab Results   Labs Ordered and Resulted from Time of ED Arrival to Time  of ED Departure   BASIC METABOLIC PANEL - Abnormal       Result Value    Sodium 139      Potassium 4.2      Chloride 104      Carbon Dioxide (CO2) 25      Anion Gap 10      Urea Nitrogen 22.5      Creatinine 1.29 (*)     GFR Estimate 53 (*)     Calcium 8.6 (*)     Glucose 100 (*)    CBC WITH PLATELETS AND DIFFERENTIAL - Abnormal    WBC Count 10.1      RBC Count 3.44 (*)     Hemoglobin 11.1 (*)     Hematocrit 33.8 (*)     MCV 98      MCH 32.3      MCHC 32.8      RDW 12.9      Platelet Count 160      % Neutrophils 83      % Lymphocytes 9      % Monocytes 7      % Eosinophils 1      % Basophils 1      % Immature Granulocytes 1      NRBCs per 100 WBC 0      Absolute Neutrophils 8.3      Absolute Lymphocytes 0.9      Absolute Monocytes 0.7      Absolute Eosinophils 0.1      Absolute Basophils 0.1      Absolute Immature Granulocytes 0.1      Absolute NRBCs 0.0     ISTAT GASES LACTATE VENOUS POCT - Abnormal    Lactic Acid POCT 1.2      Bicarbonate Venous POCT 29 (*)     O2 Sat, Venous POCT 36 (*)     pCO2 Venous POCT 54 (*)     pH Venous POCT 7.34      pO2 Venous POCT 23 (*)     Base Excess/Deficit (+/-) POCT 3.0     ROUTINE UA WITH MICROSCOPIC REFLEX TO CULTURE - Abnormal    Color Urine Light Yellow      Appearance Urine Clear      Glucose Urine Negative      Bilirubin Urine Negative      Ketones Urine Negative      Specific Gravity Urine 1.012      Blood Urine Negative      pH Urine 5.0      Protein Albumin Urine Negative      Urobilinogen Urine Normal      Nitrite Urine Negative      Leukocyte Esterase Urine Negative      RBC Urine <1      WBC Urine 1      Hyaline Casts Urine 3 (*)        Imaging   XR Chest 2 Views   Final Result   IMPRESSION: Stable size of cardiomediastinal silhouette. No significant change in appearance of patchy airspace disease in the left lower lobe. No new airspace consolidation, pleural effusion or pneumothorax. Bones are unchanged.          EKG   None    Independent Interpretation   Chest x-ray  significant for improved left lower lobe infiltrate, no pneumothorax    ED Course      Medications Administered   Medications   sodium chloride 0.9% BOLUS 1,000 mL (0 mLs Intravenous Stopped 5/14/25 1317)       Procedures   Procedures     Discussion of Management   Admitting Hospitalist, Dr. Steven    ED Course   ED Course as of 05/14/25 1349   Wed May 14, 2025   1103 I obtained history and examined the patient as noted above.     1311 I consulted with Josephine Carney PA-C of the hospitalist service. They accepted care of the patient on behalf of Dr. Steven.       Additional Documentation  Social determinants include age increasing risk for presentation to the ED.     Medical Decision Making / Diagnosis     CMS Diagnoses: None    MIPS   None               MDM   Riki Alvarez is a 89 year old male presenting with generalized weakness    Differential diagnosis includes generalized weakness secondary to recent pneumonia diagnosis, electrolyte abnormality, orthostatic lightheadedness.  Chest x-ray results as noted above.  Labs significant for intervally elevated creatinine consistent with acute kidney injury, normal white blood cell count.  Patient reports that he is taking his antibiotics at home.  He was orthostatic in the emergency department IV fluids were given.  The patient was subsequently admitted to the hospitalist service given he lives independently, persistent weakness and risk for falling unable to complete ADLs.  The patient was counseled on results, diagnosis and disposition.  He is understanding agreeable to plan.  Patient was subsequently mated stable condition    Disposition   The patient was admitted to the hospital.     Diagnosis     ICD-10-CM    1. LADY (acute kidney injury)  N17.9       2. Generalized weakness  R53.1       3. Orthostatic lightheadedness  R42            Discharge Medications   New Prescriptions    No medications on file         Scribe Disclosure:  I, Jose Manuel Liang, am serving as a  scribe at 10:56 AM on 5/14/2025 to document services personally performed by Estiven Abreu MD based on my observations and the provider's statements to me.        Estiven Abreu MD  05/14/25 9546

## 2025-05-14 NOTE — PROVIDER NOTIFICATION
MD Notification    Notified Person: hospitalist    Notified Person Name: Josephine Carney, PA    Notification Date/Time: 5/14/25 9326    Notification Interaction: kevin paged    Purpose of Notification: Pt's /73 at rest. I know he has had positive orthostatic BPs. Do you want PRN BP med/ parameters?    Orders Received: no new orders, consider PRN if SBP greater than 180 and sustained.

## 2025-05-15 ENCOUNTER — APPOINTMENT (OUTPATIENT)
Dept: PHYSICAL THERAPY | Facility: CLINIC | Age: OVER 89
End: 2025-05-15
Attending: PHYSICIAN ASSISTANT
Payer: MEDICARE

## 2025-05-15 VITALS
DIASTOLIC BLOOD PRESSURE: 61 MMHG | TEMPERATURE: 98.2 F | OXYGEN SATURATION: 96 % | RESPIRATION RATE: 18 BRPM | SYSTOLIC BLOOD PRESSURE: 132 MMHG | HEART RATE: 68 BPM

## 2025-05-15 LAB
ANION GAP SERPL CALCULATED.3IONS-SCNC: 7 MMOL/L (ref 7–15)
BACTERIA SPEC CULT: NORMAL
BACTERIA SPEC CULT: NORMAL
BUN SERPL-MCNC: 15.6 MG/DL (ref 8–23)
CALCIUM SERPL-MCNC: 8.8 MG/DL (ref 8.8–10.4)
CHLORIDE SERPL-SCNC: 109 MMOL/L (ref 98–107)
CREAT SERPL-MCNC: 0.94 MG/DL (ref 0.67–1.17)
EGFRCR SERPLBLD CKD-EPI 2021: 77 ML/MIN/1.73M2
ERYTHROCYTE [DISTWIDTH] IN BLOOD BY AUTOMATED COUNT: 12.8 % (ref 10–15)
GLUCOSE SERPL-MCNC: 96 MG/DL (ref 70–99)
HCO3 SERPL-SCNC: 24 MMOL/L (ref 22–29)
HCT VFR BLD AUTO: 36.4 % (ref 40–53)
HGB BLD-MCNC: 12.3 G/DL (ref 13.3–17.7)
MCH RBC QN AUTO: 32.5 PG (ref 26.5–33)
MCHC RBC AUTO-ENTMCNC: 33.8 G/DL (ref 31.5–36.5)
MCV RBC AUTO: 96 FL (ref 78–100)
PLATELET # BLD AUTO: 179 10E3/UL (ref 150–450)
POTASSIUM SERPL-SCNC: 4.5 MMOL/L (ref 3.4–5.3)
RBC # BLD AUTO: 3.78 10E6/UL (ref 4.4–5.9)
SODIUM SERPL-SCNC: 140 MMOL/L (ref 135–145)
WBC # BLD AUTO: 8.2 10E3/UL (ref 4–11)

## 2025-05-15 PROCEDURE — G0378 HOSPITAL OBSERVATION PER HR: HCPCS

## 2025-05-15 PROCEDURE — 36415 COLL VENOUS BLD VENIPUNCTURE: CPT | Performed by: PHYSICIAN ASSISTANT

## 2025-05-15 PROCEDURE — 250N000013 HC RX MED GY IP 250 OP 250 PS 637: Performed by: PHYSICIAN ASSISTANT

## 2025-05-15 PROCEDURE — 85027 COMPLETE CBC AUTOMATED: CPT | Performed by: PHYSICIAN ASSISTANT

## 2025-05-15 PROCEDURE — 97530 THERAPEUTIC ACTIVITIES: CPT | Mod: GP

## 2025-05-15 PROCEDURE — 99232 SBSQ HOSP IP/OBS MODERATE 35: CPT | Performed by: PHYSICIAN ASSISTANT

## 2025-05-15 PROCEDURE — 97116 GAIT TRAINING THERAPY: CPT | Mod: GP

## 2025-05-15 PROCEDURE — 80048 BASIC METABOLIC PNL TOTAL CA: CPT | Performed by: PHYSICIAN ASSISTANT

## 2025-05-15 PROCEDURE — 97161 PT EVAL LOW COMPLEX 20 MIN: CPT | Mod: GP

## 2025-05-15 PROCEDURE — 120N000001 HC R&B MED SURG/OB

## 2025-05-15 RX ORDER — LOSARTAN POTASSIUM 25 MG/1
25 TABLET ORAL DAILY
Status: DISCONTINUED | OUTPATIENT
Start: 2025-05-15 | End: 2025-05-19 | Stop reason: HOSPADM

## 2025-05-15 RX ORDER — ROSUVASTATIN CALCIUM 10 MG/1
10 TABLET, COATED ORAL DAILY
Status: DISCONTINUED | OUTPATIENT
Start: 2025-05-15 | End: 2025-05-19 | Stop reason: HOSPADM

## 2025-05-15 RX ORDER — CARBOXYMETHYLCELLULOSE SODIUM 5 MG/ML
1 SOLUTION/ DROPS OPHTHALMIC DAILY PRN
Status: DISCONTINUED | OUTPATIENT
Start: 2025-05-15 | End: 2025-05-19 | Stop reason: HOSPADM

## 2025-05-15 RX ORDER — FUROSEMIDE 20 MG/1
10 TABLET ORAL EVERY MORNING
Status: DISCONTINUED | OUTPATIENT
Start: 2025-05-16 | End: 2025-05-19 | Stop reason: HOSPADM

## 2025-05-15 RX ORDER — LORAZEPAM 0.5 MG/1
0.5 TABLET ORAL DAILY PRN
Status: DISCONTINUED | OUTPATIENT
Start: 2025-05-15 | End: 2025-05-19 | Stop reason: HOSPADM

## 2025-05-15 RX ADMIN — DONEPEZIL HYDROCHLORIDE 5 MG: 5 TABLET, FILM COATED ORAL at 20:22

## 2025-05-15 RX ADMIN — PANTOPRAZOLE SODIUM 40 MG: 40 TABLET, DELAYED RELEASE ORAL at 08:47

## 2025-05-15 RX ADMIN — AZITHROMYCIN DIHYDRATE 250 MG: 250 TABLET ORAL at 08:47

## 2025-05-15 RX ADMIN — POLYETHYLENE GLYCOL 400 AND PROPYLENE GLYCOL 1 DROP: 4; 3 SOLUTION/ DROPS OPHTHALMIC at 08:46

## 2025-05-15 RX ADMIN — ROSUVASTATIN CALCIUM 10 MG: 10 TABLET, FILM COATED ORAL at 16:51

## 2025-05-15 RX ADMIN — METOPROLOL SUCCINATE 25 MG: 25 TABLET, EXTENDED RELEASE ORAL at 08:47

## 2025-05-15 RX ADMIN — ESCITALOPRAM OXALATE 10 MG: 10 TABLET ORAL at 20:22

## 2025-05-15 RX ADMIN — VALACYCLOVIR HYDROCHLORIDE 1000 MG: 1 TABLET, FILM COATED ORAL at 08:46

## 2025-05-15 RX ADMIN — TOLTERODINE 2 MG: 2 CAPSULE, EXTENDED RELEASE ORAL at 20:22

## 2025-05-15 RX ADMIN — CEFPODOXIME PROXETIL 200 MG: 200 TABLET, FILM COATED ORAL at 08:46

## 2025-05-15 RX ADMIN — CEFPODOXIME PROXETIL 200 MG: 200 TABLET, FILM COATED ORAL at 20:22

## 2025-05-15 RX ADMIN — VALACYCLOVIR HYDROCHLORIDE 1000 MG: 1 TABLET, FILM COATED ORAL at 20:22

## 2025-05-15 RX ADMIN — FLUOROMETHOLONE 1 DROP: 1 SUSPENSION/ DROPS OPHTHALMIC at 08:54

## 2025-05-15 ASSESSMENT — ACTIVITIES OF DAILY LIVING (ADL)
ADLS_ACUITY_SCORE: 70
ADLS_ACUITY_SCORE: 73
ADLS_ACUITY_SCORE: 73
ADLS_ACUITY_SCORE: 68
ADLS_ACUITY_SCORE: 70
DEPENDENT_IADLS:: CLEANING;LAUNDRY;TRANSPORTATION
ADLS_ACUITY_SCORE: 70
ADLS_ACUITY_SCORE: 73
ADLS_ACUITY_SCORE: 68
ADLS_ACUITY_SCORE: 73
ADLS_ACUITY_SCORE: 73
ADLS_ACUITY_SCORE: 70
ADLS_ACUITY_SCORE: 74
ADLS_ACUITY_SCORE: 70
ADLS_ACUITY_SCORE: 73
ADLS_ACUITY_SCORE: 68
ADLS_ACUITY_SCORE: 73
ADLS_ACUITY_SCORE: 70
ADLS_ACUITY_SCORE: 73
ADLS_ACUITY_SCORE: 73

## 2025-05-15 NOTE — PROGRESS NOTES
Observation goals  PRIOR TO DISCHARGE       Comments: PT consult: not met  Safe dispo: not met  Cr improved: not met  Nurse to notify provider when observation goals have been met and patient is ready for discharge.

## 2025-05-15 NOTE — PROVIDER NOTIFICATION
MD Notification    Notified Person: MD    Notified Person Name: Elvin Mak    Notification Date/Time:05/16/2025, 2200    Notification Interaction: Vocera     Purpose of Notification: FYI Positive for Orthostatic bp    Orders Received: Continue to monitor    Comments:

## 2025-05-15 NOTE — PROGRESS NOTES
[facility]    Medicine Progress Note - Hospitalist Service    Date of Admission:  5/14/2025    Assessment & Plan   Riki Alvarez is a 89 year old male with a past medical history significant for hypertension, hyperlipidemia, cognitive impairment, gastroesophageal reflux disease, benign prostatic hypertrophy, mood disorder, recent admission for CAP admitted on 5/14/2025 after presenting with generalized weakness.     Generalized weakness   Orthostatic hypotension  During recent admission to hospital was evaluated by PT who recommended home with family assist and home PT/RN. Once he returned home he felt increasingly weak and was unable to ambulate without family assist. Ultimately he could not get out of bed on his own and he returned to the ED.   *orthostatics ED positive with 60 point systolic drip with associated symptoms, improving to 25 mmHg drop with IVF and holding cardiac medications and anticholinergics  --inpatient status  --continue abx for pneumonia  --encourage po intake   --Completed IVF   --hold BP meds, flomax, continue PTA BB, reassess 5/16/25, can resume if orthostasis is improving   --PT recommending TCU  --SW consulted, pt lives independently PTA. Home care was ordered on discharge but had not yet established care. Referrals sent for TCU  --Expect discharge in 1-2 days when orthostatics improve and TCU secured    LADY, improving  Suspect hypovolemia as he has had poor po intake. Cr on discharge 5/13 was 0.75 and is now 1.29 on admission. s/p 1L bolus in the ED, continued maintenance for additional 500cc with improvement to 0.94.  --hold PTA losartan, furosemide  --Trend BMP PRN    Community acquired pneumonia  Recently admitted to [facility] from 5/11/25-5/13/25 after presenting with cough and weakness x1 week.   Received ceftriaxone and azithromycin in the hospital. He remained afebrile without leukocytosis and did not require oxygen   He was discharged on Vantin BID and  azithromycin to complete course   Symptoms improved at time of discharge and are minimal at this time.  On admission he is afebrile with stable oxygen sats and normal WBC  --continue po Vantin to complete course  --completed 5 days of azithromycin    Anxiety with Recent Panic Symptoms  History of bereavement  History of insomnia  Wife passed away 2024. Pt has had intermittent exacerbated anxiety symptoms since but have been worse recently. Met with PCP 25, started on low dose lorazepam.  - hold PTA lorazepam given fall risk, weakness      Essential hypertension  Hyperlipidemia  PTA: furosemide 10mg daily, losartan 25mg daily, metoprolol XL 25mg daily   --hold PTA medications for now given orthostatic hypotension and LADY, can resume BB later today if BP elevated   --resume PTA rosuvastatin      Anemia, normocytic, stable  Hx melena s/p EGD, 25  Hospital admission in February for dark stools and concerns of melena, for details see admission note. Not on AC, no previous GIB. + FH (brother  of colon CA).    - GI consulted during previous admission - Dr. Simons's team  - EGD  without any findings to explain melenic stools; biopsies sent for H.pylori                - Colonoscopy  w/ normal ileum, colon normal, internal hemorrhoids.   - Anemia thought to be related to duodenal ulcer, but unclear. Plans to follow up with Kristyn [facility]URN for possible pill cam. Hgb remained stable. Occult stool testing negative.  --Hgb during recent admission 13>12. Present admission 11.1. no overt bleeding but will need to be followed given orthostasis/weakness     Recent Labs   Lab 05/15/25  0744 25  1002 25  0615 25  1650   HGB 12.3* 11.1* 12.0* 13.1*       Overactive Bladder  Follows with Dr. Butler (urology). Currently on Detrol, but still having urinary frequency, leaking. These symptoms are making it difficult to leave the house.   - recommended follow up with Urology outpatient      Benign  prostatic hypertrophy: Hold  tamsulosin 5/14 given orthostatic hypotension, resume as able      Gastroesophageal reflux disease: Continue protonix 40mg daily      History of herpes simplex virus infection of eyelid: Continue prior to admission famciclovir and eye drops     History of cognitive impairment: Continue donepezil     History of mood disorder: Continue escitalopram        Diet: Regular Diet Adult    DVT Prophylaxis: Pneumatic Compression Devices and Ambulate every shift  Garcia Catheter: Not present  Lines: None     Cardiac Monitoring: None  Code Status: No CPR- Do NOT Intubate      Clinically Significant Risk Factors Present on Admission          # Hyperchloremia: Highest Cl = 109 mmol/L in last 2 days, will monitor as appropriate              # Hypertension: Noted on problem list     # Dementia: noted on problem list           # Financial/Environmental Concerns:           Social Drivers of Health    Depression: At risk (11/19/2024)    Received from [email or web address]    PHQ-2     PHQ-2 TOTAL SCORE: 3   Tobacco Use: Medium Risk (5/14/2025)    Patient History     Smoking Tobacco Use: Former     Smokeless Tobacco Use: Never    Received from [email or web address]    Social Connections          Disposition Plan     Medically Ready for Discharge: Anticipated Tomorrow when orthostatics improved and TCU secured           The patient's care was discussed with the Attending Physician, Dr. Mansfield, Bedside Nurse, Care Coordinator/, Patient, and Patient's Family.    Maria Teresa Verde PA-C  Hospitalist Service  [facility]  Securely message with [employer] (more info)  Text page via [employer] Paging/Directory   ______________________________________________________________________    Interval History   Seen and examined. Still weak but improving. LADY improving. No new symptoms. No N/V. Agreeable to TCU. Orthostatics improving, not  lightheaded at rest. Questions welcomed and answered to the best of my knowledge.    Physical Exam   Vital Signs: Temp: 99.1  F (37.3  C) Temp src: Oral BP: (!) 147/77 Pulse: 65   Resp: 18 SpO2: 94 % O2 Device: None (Room air)    Weight: 0 lbs 0 oz    Physical Exam    General: Awake, alert, very pleasant elderly frail man who appears older than stated age. Looks comfortable sitting up in bed. No acute distress.  HEENT: Normocephalic, atraumatic. Extraocular movements intact.  Respiratory: Clear to auscultation bilaterally, no rales, wheezing, or rhonchi.  Cardiovascular: Regular rate and rhythm, +S1 and S2, no murmur auscultated. No peripheral edema.   Gastrointestinal: Soft, non-tender, non-distended. Bowel sounds present.  Skin: Warm, dry. No obvious rashes or lesions on exposed skin. Dorsalis pedis pulses palpable bilaterally.  Musculoskeletal: No joint swelling, erythema or tenderness. Moves all extremities equally.  Neurologic: AAO x3. Forgetful, Tribal  Psychiatric: Appropriate mood and affect. No obvious anxiety or depression.      Medical Decision Making       >45 MINUTES SPENT BY ME on the date of service doing chart review, history, exam, documentation & further activities per the note.      Data     I have personally reviewed the following data over the past 24 hrs:    8.2  \   12.3 (L)   / 179     140 109 (H) 15.6 /  96   4.5 24 0.94 \       Imaging results reviewed over the past 24 hrs:   No results found for this or any previous visit (from the past 24 hours).

## 2025-05-15 NOTE — PLAN OF CARE
PRIMARY Concern: gen weakness, positive orthostatic BPs  SAFETY RISK Concerns (fall risk, behaviors, etc.): fall risk      Aggression Tool Color: green  Isolation/Type: none  Tests/Procedures for NEXT shift: orthostatic BP check  Consults? (Pending/following, signed-off?) PT/SW  Where is patient from? (Home, TCU, etc.): home  Other Important info for NEXT shift: was recently hospitalized from 5/11-5/13 with CAP, returned d/t weakness/dizziness  Anticipated DC date & active delays: TBD pending PT eval and safe dispo and improved creatinine    SUMMARY NOTE:   Orientation/Cognitive: A&Ox4  Observation Goals (Met/ Not Met): not met  Mobility Level/Assist Equipment: not OOB yet, Ax2 in ED  Antibiotics & Plan (IV/po, length of tx left): oral vantin and azithromycin  Pain Management: denies pain  Complete Pain Reassessment: Y/N N/a Due next: next shift  Tele/VS/O2: SBP 170s, MD aware, page if sustained above 180s. RA.  ABNL Lab/BG: Cr elevated  Diet: regular diet  Bowel/Bladder: incontinent of small BM, incontinent of urine- external catheter in place  Skin Concerns: blanchable erythema to coccyx- mepilex in place, scab/bruise to shins, flaky skin to heels and chest  Drains/Devices: PIV SL

## 2025-05-15 NOTE — CONSULTS
Care Management Initial Consult    General Information  Assessment completed with: Family, santana JOHN Castillo  Type of CM/SW Visit: Initial Assessment    Primary Care Provider verified and updated as needed:     Readmission within the last 30 days:        Reason for Consult: discharge planning  Advance Care Planning: Advance Care Planning Reviewed: present on chart          Communication Assessment  Patient's communication style: spoken language (English or Bilingual)             Cognitive  Cognitive/Neuro/Behavioral: WDL        Orientation: oriented x 4, other (see comments) (forgetful)             Living Environment:   People in home: alone     Current living Arrangements: house      Able to return to prior arrangements: other (see comments)  Living Arrangement Comments: PT to evaluate    Family/Social Support:  Care provided by: self, other (see comments)  Provides care for: no one  Marital Status:   Support system: Other (specify) (Nicollin)          Description of Support System: Supportive    Support Assessment: Lacks necessary supervision and assistance    Current Resources:   Patient receiving home care services: No        Community Resources:    Equipment currently used at home:    Supplies currently used at home:      Employment/Financial:  Employment Status: retired        Financial Concerns:             Does the patient's insurance plan have a 3 day qualifying hospital stay waiver?  No    Lifestyle & Psychosocial Needs:  Social Drivers of Health     Food Insecurity: Low Risk  (1/5/2025)    Food Insecurity     Within the past 12 months, did you worry that your food would run out before you got money to buy more?: No     Within the past 12 months, did the food you bought just not last and you didn t have money to get more?: No   Depression: At risk (11/19/2024)    Received from [facility] [employer] [facility]    PHQ-2     PHQ-2 TOTAL SCORE: 3   Housing Stability: Low Risk  (1/5/2025)     Housing Stability     Do you have housing? : Yes     Are you worried about losing your housing?: No   Tobacco Use: Medium Risk (5/14/2025)    Patient History     Smoking Tobacco Use: Former     Smokeless Tobacco Use: Never     Passive Exposure: Not on file   Financial Resource Strain: Low Risk  (1/5/2025)    Financial Resource Strain     Within the past 12 months, have you or your family members you live with been unable to get utilities (heat, electricity) when it was really needed?: No   Alcohol Use: Not on file   Transportation Needs: Low Risk  (1/5/2025)    Transportation Needs     Within the past 12 months, has lack of transportation kept you from medical appointments, getting your medicines, non-medical meetings or appointments, work, or from getting things that you need?: No   Physical Activity: Not on file   Interpersonal Safety: Low Risk  (2/14/2025)    Interpersonal Safety     Do you feel physically and emotionally safe where you currently live?: Yes     Within the past 12 months, have you been hit, slapped, kicked or otherwise physically hurt by someone?: No     Within the past 12 months, have you been humiliated or emotionally abused in other ways by your partner or ex-partner?: No   Stress: Not on file   Social Connections: Unknown (7/1/2023)    Received from [employer] [facility]    Social Connections     Frequency of Communication with Friends and Family: Not on file   Health Literacy: Not on file       Functional Status:  Prior to admission patient needed assistance:   Dependent ADLs:: Ambulation-walker  Dependent IADLs:: Cleaning, Laundry, Transportation       Mental Health Status:          Chemical Dependency Status:                Values/Beliefs:  Spiritual, Cultural Beliefs, Sikh Practices, Values that affect care:                 Discussed  Partnership in Safe Discharge Planning  document with patient/family: No    Additional Information:  Patient was admitted on  5/14/25 with generalized weakness.    Spoke with Anna dillon, who is patient's HCA for initial consult. Anna confirms pt was just discharged from the hospital but went home and was weaker, not able to function independently at home. Anna is in agreement with plan for PT to evaluate and see if TCU is recommended. If it is, Anna would be in agreement and would prefer pt go to [facility], as it is very close and walkable to her home. Anna states they are also trying to get patient into [facility], which is [facility]'s assisted living facility. Anna was appreciative of call and understands we will follow up once PT sees patient. She will be visiting the hospital this afternoon.    Addendum 1557: PT recommending TCU. Spoke with pt, hospitalist, and darinel Castillo at bedside to inform. Darinel confirmed [facility] would be the preferred facility, they would also like a private room and understand this would be an additional cost per day. Anna understands pt could go into a shared room and get on a list for a private room also. If [facility] did not have any openings, she would request [facility]. Anna understands we will keep her posted on the status of the referral.    ASTRID Joshua  Social Work  [facility]

## 2025-05-15 NOTE — PLAN OF CARE
Goal Outcome Evaluation:      Plan of Care Reviewed With: patient    PRIMARY Concern: Generalized weakness, positive orthostatics   SAFETY RISK Concerns (fall risk, behaviors, etc.): Fall       Aggression Tool Color: Green   Isolation/Type: None   Tests/Procedures for NEXT shift: Ortho's q8  Consults? (Pending/following, signed-off?) PT and SW following   Where is patient from? (Home, TCU, etc.): Home   Other Important info for NEXT shift: Was recently hospitalized from 5/11-5/13 with pneumonia, on PO antibiotics   Anticipated DC date & active delays: TBD, pending clinical improvement, PT recommending TCU  _____________________________________________________________________________  SUMMARY NOTE:   Orientation/Cognitive: AOx4  Observation Goals (Met/ Not Met): Inpatient  Mobility Level/Assist Equipment: A1/GB/W  Antibiotics & Plan (IV/po, length of tx left): PO Vantin and Azithromycin   Pain Management: Denies   Complete Pain Reassessment: Y Due next: Next shift   Tele/VS/O2: VSS on RA, except HTN. Orthos +  ABNL Lab/BG: Hgb 12.3  Diet: Regular, needs help ordering meals   Bowel/Bladder: Incontinent of urine, external cath in place, 2 BM's throughout shift   Skin Concerns: Blanchable sacrum, mepilex in place. Bruising and scabs to KAVEH shins   Drains/Devices: PIV SL   Patient Stated Goal for Today: Rest

## 2025-05-15 NOTE — PROGRESS NOTES
05/15/25 1232   Appointment Info   Signing Clinician's Name / Credentials (PT) Mateo Joiner, PT, DPT   Living Environment   People in Home alone   Current Living Arrangements house   Home Accessibility stairs to enter home;stairs within home   Number of Stairs, Main Entrance 4   Stair Railings, Main Entrance   (uses door handles for stability)   Number of Stairs, Within Home, Primary greater than 10 stairs   Stair Railings, Within Home, Primary railings safe and in good condition  (has stair lift for initial 10 stairs then landing, final 4 steps with handrails.)   Transportation Anticipated agency;family or friend will provide   Living Environment Comments Reports living alone in a house. 4 AMY no handrails (uses door handles). Notes that he does have a stair lift in his home but some times does not use. Notes that even after going up the stair lift he needs to complete another ~5 stairs.   Self-Care   Usual Activity Tolerance good   Current Activity Tolerance fair   Equipment Currently Used at Home walker, rolling   Fall history within last six months no   Activity/Exercise/Self-Care Comment Reports uses a FWW vs 4WW for mobility. Has one on each floor. Independent w/ all ADLs. Nieces come over daily.   General Information   Onset of Illness/Injury or Date of Surgery 05/14/25   Referring Physician Josephine Carney PA-C   Patient/Family Therapy Goals Statement (PT) patient would like to get better   Pertinent History of Current Problem (include personal factors and/or comorbidities that impact the POC) 89 year old male with a past medical history significant for hypertension, hyperlipidemia, cognitive impairment, gastroesophageal reflux disease, benign prostatic hypertrophy, mood disorder, recent admission for CAP admitted on 5/14/2025 after presenting with generalized weakness.   Existing Precautions/Restrictions fall   Cognition   Affect/Mental Status (Cognition)   (forgetful at times)   Orientation Status  (Cognition) oriented x 4   Follows Commands (Cognition) WFL   Strength (Manual Muscle Testing)   Strength (Manual Muscle Testing) Deficits observed during functional mobility   Bed Mobility   Comment, (Bed Mobility) supine<>sit transfers with SBA, HOB elevated, and use of bedrail.   Transfers   Comment, (Transfers) sit<>stand with FWW and CGA-min assist x1   Gait/Stairs (Locomotion)   Comment, (Gait/Stairs) ambulation with FWW and CGA   Balance   Balance Comments requires BUE Support on FWW   Sensory Examination   Sensory Perception patient reports no sensory changes   Clinical Impression   Criteria for Skilled Therapeutic Intervention Yes, treatment indicated   PT Diagnosis (PT) impaired mobility   Influenced by the following impairments weakness, reduced activity tolerance, impaired balance, positive orthostatics   Functional limitations due to impairments impaired functional mobility, impaired gait, transfers, bed mobility, stair navigation   Clinical Presentation (PT Evaluation Complexity) stable   Clinical Presentation Rationale clinical judgment   Clinical Decision Making (Complexity) low complexity   Planned Therapy Interventions (PT) balance training;bed mobility training;gait training;home exercise program;patient/family education;stair training;strengthening;transfer training   Risk & Benefits of therapy have been explained evaluation/treatment results reviewed;care plan/treatment goals reviewed;risks/benefits reviewed;current/potential barriers reviewed;participants voiced agreement with care plan;participants included;patient   PT Total Evaluation Time   PT Eval, Low Complexity Minutes (25189) 6   Physical Therapy Goals   PT Frequency 6x/week   PT Predicted Duration/Target Date for Goal Attainment 05/25/25   PT Goals Bed Mobility;Transfers;Gait;Stairs   PT: Bed Mobility Independent;Supine to/from sit   PT: Transfers Modified independent;Sit to/from stand;Assistive device   PT: Gait Modified  independent;Rolling walker;Greater than 200 feet   PT: Stairs Independent;Greater than 10 stairs;Rail on both sides   Interventions   Interventions Quick Adds Therapeutic Activity;Gait Training   Therapeutic Activity   Therapeutic Activities: dynamic activities to improve functional performance Minutes (53930) 15   Symptoms Noted During/After Treatment Fatigue  (lightheadedness)   Treatment Detail/Skilled Intervention Patient supine in bed at beginning of session, agreeable to participate in PT. Patient slightly forgetful during session, asking PT to talk to his niece. Orthostatics taken during session including: supine 156/85 mmHg, sitting 142/78 mmHg, and standng 128/75. Patient slightly lightheaded in standing but requesting use of toilet for BM. Time during session for line management and room set-up. Patient performing sit<>stands on low toilet with close CGA and unilateral grab bar, cues fo use of grab bar. Difficulty with sit<>stand from low toilet. Patient attempting to perform gilma-cares in seated, difficulty noted. PT assisting with gilma-cares in standing. Patient reporting lightheadedness near end of bout of ambulation, returning to supine at end of session. Discussed discharge recommendations with patient. Patient agreeable to TCU if he does not progressed to IND while in hospital. Call light next to patient and bed alarm on at end of session.   Gait Training   Gait Training Minutes (84781) 9   Symptoms Noted During/After Treatment (Gait Training) fatigue  (lightheadedness)   Treatment Detail/Skilled Intervention Patient completed two bouts of ambulation with FWW and CGA for 10' and 35'. Adjusted walker height to better fit patient. Cues for upright posture and walker in closer proximity to body. Patient ambulating to door and back following use of restroom, reported increased lightheadedness and requesting return to seated. Lightheadedness resolved in sitting. Patient declines further ambulation.    Distance in Feet 10', 35'   PT Discharge Planning   PT Plan repeated sit<>stands, progress ambulation distance as able (wheelchair follow due to patient reporting lightheaded with OOB mobility), initiate stair training as able, standing balance   PT Discharge Recommendation (DC Rec) Transitional Care Facility;home with assist;home with home care physical therapy   PT Rationale for DC Rec Patient below baseline functional mobility. Presents with weakness, orthostatic hypotension, reduced activity tolerance, and impaired balance resulting in the need for CGA and FWW to safely mobilize. Patient lives alone in house, has nieces who stop over daily. At this time, recommend discharge to TCU for improvement of strength, activity tolerance, and independence with functional mobility. Pending progress with further medical management and IP therapies, may be able to discharge home if patient can progress to modified IND with walker and assist from his niece for IADL's, would also need HH PT to address aforementioned functional deficits.   PT Brief overview of current status CGA for mobility with walker. Goals of therapy will be to address safe mobility and make recs for d/c to next level of care. Pt and RN will continue to follow all falls risk precautions as documented by RN staff while hospitalized   PT Total Distance Amb During Session (feet) 45   Physical Therapy Time and Intention   Timed Code Treatment Minutes 24   Total Session Time (sum of timed and untimed services) 30

## 2025-05-15 NOTE — PROGRESS NOTES
Admission/Transfer from: ED  2 RN skin assessment completed. Yes  Name of 2nd RN: CAROLANN Mark  Significant findings include: Blanchable erythema to coccyx/sacrum- mepilex placed, turn and repo with pillows, scab to LLE, Bruise to RLE, dry/flaky skin to heels and chest.  Provider Paged for [facility] Nurse Consult Order? No

## 2025-05-15 NOTE — PLAN OF CARE
PRIMARY Concern: gen weakness, positive orthostatic BPs  SAFETY RISK Concerns (fall risk, behaviors, etc.): fall risk      Aggression Tool Color: green  Isolation/Type: none  Tests/Procedures for NEXT shift: orthostatic BP check  Consults? (Pending/following, signed-off?) PT/SW  Where is patient from? (Home, TCU, etc.): home  Other Important info for NEXT shift: was recently hospitalized from 5/11-5/13 with CAP, returned d/t weakness/dizziness  Anticipated DC date & active delays: TBD pending PT eval and safe dispo and improved creatinine     SUMMARY NOTE:   Orientation/Cognitive: A&Ox4  Observation Goals (Met/ Not Met): not met  Mobility Level/Assist Equipment: not OOB yet, Ax2 in ED  Antibiotics & Plan (IV/po, length of tx left): oral vantin and azithromycin  Pain Management: denies pain  Complete Pain Reassessment: Y/N N/a Due next: next shift  Tele/VS/O2: VSS/RA  ABNL Lab/BG: Cr elevated  Diet: regular diet  Bowel/Bladder: incontinent of small BM, incontinent of urine- external catheter in place  Skin Concerns: blanchable erythema to coccyx- mepilex in place, scab/bruise to shins, flaky skin to heels and chest  Drains/Devices: PIV SL               Observation goals  PRIOR TO DISCHARGE       Comments: PT consult: not met  Safe dispo: not met  Cr improved: not met  Nurse to notify provider when observation goals have been met and patient is ready for discharge.

## 2025-05-16 ENCOUNTER — APPOINTMENT (OUTPATIENT)
Dept: PHYSICAL THERAPY | Facility: CLINIC | Age: OVER 89
End: 2025-05-16
Payer: MEDICARE

## 2025-05-16 LAB
ANION GAP SERPL CALCULATED.3IONS-SCNC: 11 MMOL/L (ref 7–15)
BACTERIA SPEC CULT: NO GROWTH
BACTERIA SPEC CULT: NO GROWTH
BUN SERPL-MCNC: 12.4 MG/DL (ref 8–23)
CALCIUM SERPL-MCNC: 9.1 MG/DL (ref 8.8–10.4)
CHLORIDE SERPL-SCNC: 106 MMOL/L (ref 98–107)
CREAT SERPL-MCNC: 0.87 MG/DL (ref 0.67–1.17)
EGFRCR SERPLBLD CKD-EPI 2021: 82 ML/MIN/1.73M2
GLUCOSE SERPL-MCNC: 153 MG/DL (ref 70–99)
HCO3 SERPL-SCNC: 24 MMOL/L (ref 22–29)
POTASSIUM SERPL-SCNC: 4.2 MMOL/L (ref 3.4–5.3)
SODIUM SERPL-SCNC: 141 MMOL/L (ref 135–145)

## 2025-05-16 PROCEDURE — 250N000013 HC RX MED GY IP 250 OP 250 PS 637: Performed by: PHYSICIAN ASSISTANT

## 2025-05-16 PROCEDURE — 99232 SBSQ HOSP IP/OBS MODERATE 35: CPT | Performed by: PHYSICIAN ASSISTANT

## 2025-05-16 PROCEDURE — 36415 COLL VENOUS BLD VENIPUNCTURE: CPT | Performed by: PHYSICIAN ASSISTANT

## 2025-05-16 PROCEDURE — 97530 THERAPEUTIC ACTIVITIES: CPT | Mod: GP

## 2025-05-16 PROCEDURE — 82310 ASSAY OF CALCIUM: CPT | Performed by: PHYSICIAN ASSISTANT

## 2025-05-16 PROCEDURE — 120N000001 HC R&B MED SURG/OB

## 2025-05-16 PROCEDURE — 97116 GAIT TRAINING THERAPY: CPT | Mod: GP

## 2025-05-16 PROCEDURE — 258N000003 HC RX IP 258 OP 636: Performed by: PHYSICIAN ASSISTANT

## 2025-05-16 RX ADMIN — POLYETHYLENE GLYCOL 400 AND PROPYLENE GLYCOL 1 DROP: 4; 3 SOLUTION/ DROPS OPHTHALMIC at 08:18

## 2025-05-16 RX ADMIN — ROSUVASTATIN CALCIUM 10 MG: 10 TABLET, FILM COATED ORAL at 08:16

## 2025-05-16 RX ADMIN — VALACYCLOVIR HYDROCHLORIDE 1000 MG: 1 TABLET, FILM COATED ORAL at 21:13

## 2025-05-16 RX ADMIN — PANTOPRAZOLE SODIUM 40 MG: 40 TABLET, DELAYED RELEASE ORAL at 08:16

## 2025-05-16 RX ADMIN — TOLTERODINE 2 MG: 2 CAPSULE, EXTENDED RELEASE ORAL at 21:13

## 2025-05-16 RX ADMIN — CEFPODOXIME PROXETIL 200 MG: 200 TABLET, FILM COATED ORAL at 08:16

## 2025-05-16 RX ADMIN — METOPROLOL SUCCINATE 25 MG: 25 TABLET, EXTENDED RELEASE ORAL at 08:17

## 2025-05-16 RX ADMIN — TAMSULOSIN HYDROCHLORIDE 0.4 MG: 0.4 CAPSULE ORAL at 21:13

## 2025-05-16 RX ADMIN — FLUOROMETHOLONE 1 DROP: 1 SUSPENSION/ DROPS OPHTHALMIC at 08:42

## 2025-05-16 RX ADMIN — ESCITALOPRAM OXALATE 10 MG: 10 TABLET ORAL at 21:13

## 2025-05-16 RX ADMIN — DONEPEZIL HYDROCHLORIDE 5 MG: 5 TABLET, FILM COATED ORAL at 21:12

## 2025-05-16 RX ADMIN — SODIUM CHLORIDE, SODIUM LACTATE, POTASSIUM CHLORIDE, AND CALCIUM CHLORIDE 500 ML: .6; .31; .03; .02 INJECTION, SOLUTION INTRAVENOUS at 17:00

## 2025-05-16 RX ADMIN — CEFPODOXIME PROXETIL 200 MG: 200 TABLET, FILM COATED ORAL at 21:12

## 2025-05-16 RX ADMIN — VALACYCLOVIR HYDROCHLORIDE 1000 MG: 1 TABLET, FILM COATED ORAL at 08:15

## 2025-05-16 ASSESSMENT — ACTIVITIES OF DAILY LIVING (ADL)
ADLS_ACUITY_SCORE: 68
ADLS_ACUITY_SCORE: 68
ADLS_ACUITY_SCORE: 67
ADLS_ACUITY_SCORE: 68
ADLS_ACUITY_SCORE: 68
ADLS_ACUITY_SCORE: 67
ADLS_ACUITY_SCORE: 68
ADLS_ACUITY_SCORE: 67
ADLS_ACUITY_SCORE: 68
ADLS_ACUITY_SCORE: 67
ADLS_ACUITY_SCORE: 68
ADLS_ACUITY_SCORE: 68
ADLS_ACUITY_SCORE: 67
ADLS_ACUITY_SCORE: 68
ADLS_ACUITY_SCORE: 68
ADLS_ACUITY_SCORE: 67
ADLS_ACUITY_SCORE: 68
ADLS_ACUITY_SCORE: 67

## 2025-05-16 NOTE — PLAN OF CARE
Goal Outcome Evaluation:      Day shift summary:                PRIMARY Concern: Generalized weakness, positive orthostatics.   SAFETY RISK Concerns (fall risk, behaviors, etc.): Fall.     Aggression Tool Color: Green.   Isolation/Type: None.   Tests/Procedures for NEXT shift: Ortho's q8.  Consults? (Pending/following, signed-off?) PT and SW following   Where is patient from? (Home, TCU, etc.): Home.  Other Important info for NEXT shift: Was recently hospitalized from 5/11-5/13 with pneumonia, on PO antibiotics   Anticipated DC date & active delays: TBD, pending clinical improvement, PT recommending TCU  _____________________________________________________________________________  SUMMARY NOTE:   Orientation/Cognitive: AOx4  Observation Goals (Met/ Not Met): Inpatient  Mobility Level/Assist Equipment: A1/GB/W.  Up in chair for meals and ambulated.   Antibiotics & Plan (IV/po, length of tx left): PO Vantin and Valacyclovir   Pain Management: Denies.  Complete Pain Reassessment: Y Due next: Next shift.  Tele/VS/O2: VSS on RA, except HTN. Orthos +.   ABNL Lab/BG: Hgb 12.3  Diet: Regular, needs help ordering meals.  Bowel/Bladder: Incontinent of urine and stool.     Skin Concerns: Blanchable sacrum, mepilex in place. Bruising and scabs to KAVEH shins.   Drains/Devices: PIV SL   Patient Stated Goal for Today: ambulate more.     Anticipating patient will discharge to [facility] on Sunday if progress continues.

## 2025-05-16 NOTE — PLAN OF CARE
Goal Outcome Evaluation:           PRIMARY Concern: Generalized weakness, positive orthostatics   SAFETY RISK Concerns (fall risk, behaviors, etc.): Fall       Aggression Tool Color: Green   Isolation/Type: None   Tests/Procedures for NEXT shift: Ortho's q8  Consults? (Pending/following, signed-off?) PT and SW following   Where is patient from? (Home, TCU, etc.): Home   Other Important info for NEXT shift: Was recently hospitalized from 5/11-5/13 with pneumonia, on PO antibiotics   Anticipated DC date & active delays: TBD, pending clinical improvement, PT recommending TCU  _____________________________________________________________________________  SUMMARY NOTE:   Orientation/Cognitive: AOx4  Observation Goals (Met/ Not Met): Inpatient  Mobility Level/Assist Equipment: A1/GB/W  Antibiotics & Plan (IV/po, length of tx left): PO Vantin and Azithromycin   Pain Management: Denies   Complete Pain Reassessment: Y Due next: Next shift   Tele/VS/O2: VSS on RA, except HTN. Orthos +  ABNL Lab/BG: Hgb 12.3  Diet: Regular, needs help ordering meals   Bowel/Bladder: Incontinent of urine, external cath in place, 2 BM's throughout shift   Skin Concerns: Blanchable sacrum, mepilex in place. Bruising and scabs to KAVEH shins   Drains/Devices: PIV SL   Patient Stated Goal for Today: Rest

## 2025-05-16 NOTE — PLAN OF CARE
PRIMARY Concern: Generalized weakness, positive orthostatics   SAFETY RISK Concerns (fall risk, behaviors, etc.): Fall       Aggression Tool Color: Green   Isolation/Type: None   Tests/Procedures for NEXT shift: Ortho's q8  Consults? (Pending/following, signed-off?) PT and SW following   Where is patient from? (Home, TCU, etc.): Home   Other Important info for NEXT shift: Was recently hospitalized from 5/11-5/13 with pneumonia, on PO antibiotics   Anticipated DC date & active delays: TBD, pending clinical improvement, PT recommending TCU  _____________________________________________________________________________  SUMMARY NOTE:   Orientation/Cognitive: AOx4  Observation Goals (Met/ Not Met): Inpatient  Mobility Level/Assist Equipment: A1/GB/W  Antibiotics & Plan (IV/po, length of tx left): PO Vantin and Valacyclovir   Pain Management: Denies   Complete Pain Reassessment: Y Due next: Next shift   Tele/VS/O2: VSS on RA, except HTN. Orthos +  ABNL Lab/BG: Hgb 12.3  Diet: Regular, needs help ordering meals   Bowel/Bladder: Incontinent of urine, external cath in place,   Skin Concerns: Blanchable sacrum, mepilex in place. Bruising and scabs to KAVEH shins   Drains/Devices: PIV SL   Patient Stated Goal for Today: Rest

## 2025-05-16 NOTE — PROGRESS NOTES
Care Management Follow Up    Length of Stay (days): 1    Expected Discharge Date: 05/16/2025     Concerns to be Addressed:       Patient plan of care discussed at interdisciplinary rounds: Yes    Anticipated Discharge Disposition: Transitional Care     Anticipated Discharge Services: Transportation Services  Anticipated Discharge DME:      Patient/family educated on Medicare website which has current facility and service quality ratings: yes  Education Provided on the Discharge Plan:    Patient/Family in Agreement with the Plan: yes    Referrals Placed by CM/SW:    Private pay costs discussed: Not applicable    Discussed  Partnership in Safe Discharge Planning  document with patient/family: No     Handoff Completed: No, handoff not indicated or clinically appropriate    Additional Information:  [facility] has been accepted by [facility] for Sunday. They anticipate they will have a private room open. Updated santana Castillo, who was very happy to hear this and grateful. She confirmed pt will need [facility] wheelchair transport and understands the cost associated. Informed her we will touch base on Sunday to confirm plans with her once everything is in place.     ASTRID Joshua  Social Work  [facility]

## 2025-05-16 NOTE — PROGRESS NOTES
[facility]    Medicine Progress Note - Hospitalist Service    Date of Admission:  5/14/2025    Assessment & Plan   Riki Alvarez is a 89 year old male with a past medical history significant for hypertension, hyperlipidemia, cognitive impairment, gastroesophageal reflux disease, benign prostatic hypertrophy, mood disorder, recent admission for CAP admitted on 5/14/2025 after presenting with generalized weakness.     Generalized weakness   Orthostatic hypotension, improving  During recent admission to hospital was evaluated by PT who recommended home with family assist and home PT/RN. Once he returned home he felt increasingly weak and was unable to ambulate without family assist. Ultimately he could not get out of bed on his own and he returned to the ED.   *orthostatics ED positive with 60 point systolic drip with associated symptoms, improving to 25 mmHg drop with IVF and holding cardiac medications and anticholinergics.  Still having some orthostasis with 40 point drop noted.  However he feels he is somewhat improving.  --Inpatient status  --Continue abx for pneumonia, if ongoing fever would change him to ceftriaxone; completed doses of azithromycin  --Encourage po intake   --Give another 500 mL liter bolus IVF slowly  --Continue to hold PTA BP meds, continue PTA BB, reassess 5/17, can resume if orthostasis is improving   --Restart PTA Flomax  --PT recommending TCU  --SW consulted, pt lives independently PTA. Home care was ordered on discharge but had not yet established care. Referrals sent for TCU  --Expect discharge in 1-2 days when orthostatics improve and TCU secured    LADY, resolved  Suspect hypovolemia as he has had poor po intake. Cr on discharge 5/13 was 0.75 and is now 1.29 on admission. s/p 1L bolus in the ED, continued maintenance for additional 500cc with improvement to 0.94.  --hold PTA losartan, furosemide  --Trend BMP PRN    Community acquired pneumonia  Recently  admitted to [facility] from 25-25 after presenting with cough and weakness x1 week.   Received ceftriaxone and azithromycin in the hospital. He remained afebrile without leukocytosis and did not require oxygen. He was discharged on Vantin BID and azithromycin to complete course. Symptoms improved at time of discharge and are minimal at this time. On admission he is afebrile with stable oxygen sats and normal WBC.  --continue po Vantin to complete course  --completed 5 days of azithromycin    Anxiety with Recent Panic Symptoms  History of bereavement  History of insomnia  Wife passed away 2024. Pt has had intermittent exacerbated anxiety symptoms since but have been worse recently. Met with PCP 25, started on low dose lorazepam.  - hold PTA lorazepam given fall risk, weakness      Essential hypertension  Hyperlipidemia  PTA: furosemide 10mg daily, losartan 25mg daily, metoprolol XL 25mg daily   --hold PTA medications for now given orthostatic hypotension and LADY, can resume BB later today if BP elevated   --resume PTA rosuvastatin      Anemia, normocytic, stable  Hx melena s/p EGD, 25  Hospital admission in February for dark stools and concerns of melena, for details see admission note. Not on AC, no previous GIB. + FH (brother  of colon CA).    - GI consulted during previous admission - Dr. Simons's team  - EGD  without any findings to explain melenic stools; biopsies sent for H.pylori                - Colonoscopy  w/ normal ileum, colon normal, internal hemorrhoids.   - Anemia thought to be related to duodenal ulcer, but unclear. Plans to follow up with [facility] for possible pill cam. Hgb remained stable. Occult stool testing negative.  --PPI daily  --Hgb during recent admission 13>12. Present admission 11.1. no overt bleeding but will need to be followed given orthostasis/weakness     Recent Labs   Lab 05/15/25  0744 25  1002 25  0615 25  1650   HGB 12.3* 11.1*  12.0* 13.1*       Overactive Bladder  Follows with Dr. Butler (urology). Currently on Detrol, but still having urinary frequency, leaking. These symptoms are making it difficult to leave the house.   - recommended follow up with Urology outpatient      Benign prostatic hypertrophy: Hold  tamsulosin 5/14 given orthostatic hypotension, resume as able      Gastroesophageal reflux disease: Continue protonix 40mg daily      History of herpes simplex virus infection of eyelid: Continue prior to admission famciclovir and eye drops     History of cognitive impairment: Continue donepezil     History of mood disorder: Continue escitalopram          Diet: Regular Diet Adult    DVT Prophylaxis: Pneumatic Compression Devices and Ambulate every shift  Garcia Catheter: Not present  Lines: None     Cardiac Monitoring: None  Code Status: No CPR- Do NOT Intubate      Clinically Significant Risk Factors          # Hyperchloremia: Highest Cl = 109 mmol/L in last 2 days, will monitor as appropriate              # Hypertension: Noted on problem list     # Dementia: noted on problem list            # Financial/Environmental Concerns:           Social Drivers of Health    Depression: At risk (11/19/2024)    Received from [facility]    PHQ-2     PHQ-2 TOTAL SCORE: 3   Tobacco Use: Medium Risk (5/14/2025)    Patient History     Smoking Tobacco Use: Former     Smokeless Tobacco Use: Never    Received from [facility]    Social Connections          Disposition Plan     Medically Ready for Discharge: Anticipated in 2-4 Days on Sunday to TCU when bed available, orthostasis improving, cardiac medications reassessed, and progressing with physical therapy           The patient's care was discussed with the Attending Physician, Dr. Mansfield, Bedside Nurse, Care Coordinator/, and Patient.    Maria Teresa Verde PA-C  Hospitalist Service  [facility]  Hospital  Securely message with [employer] (more info)  Text page via [facility] Paging/Directory   ______________________________________________________________________    Interval History   Seen and examined. Orthostatics improving seen and examined orthostatics still positive at 40 point systolic drop.  Sat in chair most of the day, has no new complaints.  No chest pain, palpitations.  Stable shortness of breath.  No lightheadedness or dizziness at rest but increases with changing positions. Accepted to TCU for Sunday. Questions welcomed and answered to the best of my knowledge.    Physical Exam   Vital Signs: Temp: 99.7  F (37.6  C) Temp src: Oral BP: (!) 162/64 Pulse: 66   Resp: 18 SpO2: 96 % O2 Device: None (Room air)    Weight: 0 lbs 0 oz    General: Awake, alert, very pleasant elderly frail man who appears older than stated age. Looks comfortable sitting up in bed. No acute distress.  HEENT: Normocephalic, atraumatic. Extraocular movements intact.  Respiratory: Clear to auscultation bilaterally, no rales, wheezing, or rhonchi.  Cardiovascular: Regular rate and rhythm, +S1 and S2, no murmur auscultated. No peripheral edema.   Gastrointestinal: Soft, non-tender, non-distended. Bowel sounds present.  Skin: Warm, dry. No obvious rashes or lesions on exposed skin. Dorsalis pedis pulses palpable bilaterally.  Musculoskeletal: No joint swelling, erythema or tenderness. Moves all extremities equally.  Neurologic: AAO x3. Forgetful, Council  Psychiatric: Appropriate mood and affect. No obvious anxiety or depression.    Medical Decision Making       >35 MINUTES SPENT BY ME on the date of service doing chart review, history, exam, documentation & further activities per the note.      Data     I have personally reviewed the following data over the past 24 hrs:    N/A  \   N/A   / N/A     141 106 12.4 /  153 (H)   4.2 24 0.87 \       Imaging results reviewed over the past 24 hrs:   No results found for this or any previous visit  (from the past 24 hours).

## 2025-05-17 PROCEDURE — 258N000003 HC RX IP 258 OP 636

## 2025-05-17 PROCEDURE — 250N000013 HC RX MED GY IP 250 OP 250 PS 637: Performed by: PHYSICIAN ASSISTANT

## 2025-05-17 PROCEDURE — 120N000001 HC R&B MED SURG/OB

## 2025-05-17 PROCEDURE — 99233 SBSQ HOSP IP/OBS HIGH 50: CPT | Performed by: STUDENT IN AN ORGANIZED HEALTH CARE EDUCATION/TRAINING PROGRAM

## 2025-05-17 RX ADMIN — VALACYCLOVIR HYDROCHLORIDE 1000 MG: 1 TABLET, FILM COATED ORAL at 21:42

## 2025-05-17 RX ADMIN — TOLTERODINE 2 MG: 2 CAPSULE, EXTENDED RELEASE ORAL at 21:41

## 2025-05-17 RX ADMIN — VALACYCLOVIR HYDROCHLORIDE 1000 MG: 1 TABLET, FILM COATED ORAL at 08:08

## 2025-05-17 RX ADMIN — DONEPEZIL HYDROCHLORIDE 5 MG: 5 TABLET, FILM COATED ORAL at 21:41

## 2025-05-17 RX ADMIN — PANTOPRAZOLE SODIUM 40 MG: 40 TABLET, DELAYED RELEASE ORAL at 08:08

## 2025-05-17 RX ADMIN — ROSUVASTATIN CALCIUM 10 MG: 10 TABLET, FILM COATED ORAL at 08:08

## 2025-05-17 RX ADMIN — ESCITALOPRAM OXALATE 10 MG: 10 TABLET ORAL at 21:41

## 2025-05-17 RX ADMIN — POLYETHYLENE GLYCOL 400 AND PROPYLENE GLYCOL 1 DROP: 4; 3 SOLUTION/ DROPS OPHTHALMIC at 08:08

## 2025-05-17 RX ADMIN — CEFPODOXIME PROXETIL 200 MG: 200 TABLET, FILM COATED ORAL at 08:08

## 2025-05-17 RX ADMIN — FLUOROMETHOLONE 1 DROP: 1 SUSPENSION/ DROPS OPHTHALMIC at 08:08

## 2025-05-17 RX ADMIN — SODIUM CHLORIDE, SODIUM LACTATE, POTASSIUM CHLORIDE, AND CALCIUM CHLORIDE 500 ML: .6; .31; .03; .02 INJECTION, SOLUTION INTRAVENOUS at 18:34

## 2025-05-17 RX ADMIN — TAMSULOSIN HYDROCHLORIDE 0.4 MG: 0.4 CAPSULE ORAL at 21:41

## 2025-05-17 RX ADMIN — METOPROLOL SUCCINATE 25 MG: 25 TABLET, EXTENDED RELEASE ORAL at 08:08

## 2025-05-17 RX ADMIN — CEFPODOXIME PROXETIL 200 MG: 200 TABLET, FILM COATED ORAL at 21:41

## 2025-05-17 ASSESSMENT — ACTIVITIES OF DAILY LIVING (ADL)
ADLS_ACUITY_SCORE: 67
ADLS_ACUITY_SCORE: 65
ADLS_ACUITY_SCORE: 67

## 2025-05-17 NOTE — PROGRESS NOTES
Care Management Follow Up    Length of Stay (days): 2    Expected Discharge Date: 05/18/2025     Concerns to be Addressed:       Patient plan of care discussed at interdisciplinary rounds: Yes    Anticipated Discharge Disposition: Transitional Care              Anticipated Discharge Services: Transportation Services  Anticipated Discharge DME:      Patient/family educated on Medicare website which has current facility and service quality ratings: yes  Education Provided on the Discharge Plan:    Patient/Family in Agreement with the Plan: yes    Referrals Placed by CM/SW:    Private pay costs discussed: Not applicable    Discussed  Partnership in Safe Discharge Planning  document with patient/family: No     Handoff Completed: No, handoff not indicated or clinically appropriate    Additional Information:  Writer spoke with Doctor Vazquez. Writer spoke with Anita from [facility] she states that she has a shared room for pt available tomorrow.   Writer spoke with pt's Darinel Castillo who is agreeable for pt to attend a shared room. However, pt does prefer a private. She asked that admissions team be notified to put pt on list to transfer to a private room as soon as one becomes available. Writer agreeable to notify admissions of this. Anna states she will update pt of discharge to shared room while waiting for private. Anna states they are aware of the private pay costs.Anna states agreement for discharge tomorrow to [facility]. She states pt will need transport set up. She states agreement for writer to set one up for pt. She is aware writer will reach out to her tomorrow to notify her if pt is medically stable for discharge after discussing with doctor.  Writer spoke with [facility] transport and ride requested for 2pm but the earliest available ride for tomorrow is [phone]. Mohini states they will call writer tomorrow if something earlier opens up. Writer agreeable.    Addendum: Passr completed, placed in  pt's chart.    PAS-RR    D: Per DHS regulation, SW completed and submitted PAS-RR to [place] [employer]ging Direct Connect via the Senior LinkAge Line.  PAS-RR confirmation # is : [id number]    P: Further questions may be directed to Senior LinkAge Line at #[phone], option #4 for PAS-RR staff.      Next Steps: possible discharge tomorrow.     Hawa Rush, BSW  Social Work  [facility]

## 2025-05-17 NOTE — PROGRESS NOTES
[facility] Medicine Cross Cover Note    May 17, 2025 6:20 PM    I was notified by  that this patient had orthostatic hypotension.     Action taken:   -ringer lactate 500 ml bolus ordered    Communicated plan via secure messaging.       Mele Hidalgo MD on 5/17/2025 at 6:20 PM

## 2025-05-17 NOTE — PROGRESS NOTES
[id number]: Orthos positive. Otherwise no acute concerns overnight. Pt appeared to sleep well between cares.    PRIMARY Concern: Generalized weakness, positive orthostatics.   SAFETY RISK Concerns (fall risk, behaviors, etc.): Fall.     Aggression Tool Color: Green.   Isolation/Type: None.   Tests/Procedures for NEXT shift: Orthos q 8 hrs  Consults? (Pending/following, signed-off?) PT rec TCU. SW/CC following.  Where is patient from? (Home, TCU, etc.): Home  Other Important info for NEXT shift: recently hospitalized from 5/11-5/13 with pneumonia, on PO antibiotics   Anticipated DC date & active delays: Pending medical clearance. [facility] 5/18/25, time TBD.   _____________________________________________________________________________  SUMMARY NOTE:   Orientation/Cognitive: A&Ox4. Very pleasant  Observation Goals (Met/ Not Met): INPATIENT  Mobility Level/Assist Equipment: Up Ax1 GB/walker. Up in chair for meals  Antibiotics & Plan (IV/po, length of tx left): Completed 5 days azithromycin. PO Vantin and Valacyclovir   Pain Management: Denies.  Complete Pain Reassessment: Y Due next: Next shift.  Tele/VS/O2: VSS on RA, Temp 99.2. Orthos + (MD notified, see note)  ABNL Lab/BG: Hgb 12.3  Diet: Regular, needs help ordering meals.  Bowel/Bladder: Incontinent of urine and stool. External cath in place. Smear BM overnight  Skin Concerns: Blanchable sacrum, mepilex in place CDI. Bruising and scabs to KAVEH shins.   Drains/Devices: PIV SL

## 2025-05-17 NOTE — PROGRESS NOTES
"MD Notification    Notified Person: MD    Notified Person Name:  Abradhames Nayla    Notification Date/Time: 05025    Notification Interaction: [employer] messaging    Purpose of Notification: Admitted 2025 for generalized weakness. orthos q shift; positive. Pt complained of a \"a little\" dizziness w/ sitting and standing but \"not too bad\". Feels very weak. Did receive bolus 500mL LR at 125mL/hr last vicki around 1700.   Layin/54 HR 71   Sittin/58 HR 78   Standin/49 HR 80    Orders Received: No new orders continue to monitor    "

## 2025-05-17 NOTE — PLAN OF CARE
Goal Outcome Evaluation:    PRIMARY Concern: Generalized weakness, positive orthostatics.   SAFETY RISK Concerns (fall risk, behaviors, etc.): Fall.     Aggression Tool Color: Green.   Isolation/Type: None.   Tests/Procedures for NEXT shift: Orthos q 8 hrs  Consults? (Pending/following, signed-off?) PT rec TCU. SW/CC following.  Where is patient from? (Home, TCU, etc.): Home  Other Important info for NEXT shift: recently hospitalized from 5/11-5/13 with pneumonia, on PO antibiotics   Anticipated DC date & active delays: [facility] for [facility] 5/18/25, time requested for 2pm but the earliest available ride for tomorrow is [phone]  _____________________________________________________________________________  SUMMARY NOTE:   Orientation/Cognitive: A&Ox4. Very pleasant  Observation Goals (Met/ Not Met): INPATIENT  Mobility Level/Assist Equipment: Up Ax1 GB/walker. Up in chair for meals  Antibiotics & Plan (IV/po, length of tx left): Completed 5 days azithromycin. PO Vantin and Valacyclovir   Pain Management: Denies.  Complete Pain Reassessment: Y Due next: Next shift.  Tele/VS/O2: VSS on RA. Orthos   ABNL Lab/BG: Hgb 12.3  Diet: Regular, needs help ordering meals.  Bowel/Bladder: Incontinent of urine and stool. External cath in place. Smear BM   Skin Concerns: Blanchable sacrum, mepilex in place CDI. Bruising and scabs to KAVEH shins.   Drains/Devices: PIV SL

## 2025-05-17 NOTE — PROGRESS NOTES
PRIMARY Concern: Generalized weakness, positive orthostatics   SAFETY RISK Concerns (fall risk, behaviors, etc.): Fall       Aggression Tool Color: Green   Isolation/Type: None   Tests/Procedures for NEXT shift: Ortho's q8  Consults? (Pending/following, signed-off?) PT and SW following   Where is patient from? (Home, TCU, etc.): Home   Other Important info for NEXT shift: Was recently hospitalized from 5/11-5/13 with pneumonia, on PO antibiotics.   Anticipated DC date & active delays: [facility] on Sunday, time is TBD  _____________________________________________________________________________  SUMMARY NOTE:   Orientation/Cognitive: Aox4,cooperative and pleasant   Observation Goals (Met/ Not Met): Inpatient  Mobility Level/Assist Equipment: A1/GB/W  Antibiotics & Plan (IV/po, length of tx left): PO Vantin and Azithromycin   Pain Management: Denies   Complete Pain Reassessment: Y   Due next: Next shift   Tele/VS/O2: VSS on RA, except HTN. Orthos + (LR bolus given x1 this shift)  ABNL Lab/BG: Hgb 12.3  Diet: Regular, needs help ordering meals   Bowel/Bladder: Incontinent of urine, external cath in place   Skin Concerns: Blanchable sacrum, mepilex in place. Bruising and scabs to KAVEH shins   Drains/Devices: PIV SL   Patient Stated Goal for Today: Rest

## 2025-05-17 NOTE — PROGRESS NOTES
[facility]  Hospitalist Progress Note    Assessment & Plan   Riki Alvarez is a 89 year old male with a past medical history significant for hypertension, hyperlipidemia, cognitive impairment, gastroesophageal reflux disease, benign prostatic hypertrophy, mood disorder, recent admission for CAP admitted on 5/14/2025 after presenting with generalized weakness.      Generalized Weakness   Orthostatic Hypotension: Improving  During recent admission to hospital was evaluated by PT who recommended home with family assist and home PT/RN. Once he returned home he felt increasingly weak and was unable to ambulate without family assist. Ultimately he could not get out of bed on his own and he returned to the ED.   *Orthostatics in the ED were positive with 60 point systolic drip with associated symptoms, improving to 25 mmHg drop with IVF and holding cardiac medications and anticholinergics.  Still having some orthostasis  however he feels he is improving. He does mention that he has been dealing with intermittent dizziness for a while now.     - S/p IVF   - Continue Cefpodoxime 200mg BID   - Completed a course of Azithromycin     - Continue to hold PTA BP meds  - Continue home PTA Flomax  - PT recommending TCU  - SW consulted  - Pt lives independently PTA. Home care was ordered on discharge but had not yet established care. Referrals sent for TCU     LADY: Resolved   Suspect hypovolemia as he has had poor po intake. Cr on discharge 5/13 was 0.75 and is now 1.29 on admission. s/p 1L bolus in the ED, continued maintenance for additional 500cc with improvement to 0.94.  - Continue to hold PTA losartan, furosemide  - Trend BMP PRN     Community acquired pneumonia  Recently admitted to [facility] from 5/11/25-5/13/25 after presenting with cough and weakness x1 week.   Received ceftriaxone and azithromycin in the hospital. He remained afebrile without leukocytosis and did not require oxygen. He was  discharged on Vantin BID and azithromycin to complete course. Symptoms improved at time of discharge and are minimal at this time. On admission he is afebrile with stable oxygen sats and normal WBC.  - Continue Cefopdoxime 200mg BID   - Completed a course of Azithromycin      Anxiety with Recent Panic Symptoms  History of bereavement  History of insomnia  Wife passed away 2024. Pt has had intermittent exacerbated anxiety symptoms since but have been worse recently. Met with PCP 25, started on low dose lorazepam.  - Hold PTA lorazepam given fall risk, weakness      Essential hypertension  Hyperlipidemia  PTA: furosemide 10mg daily, losartan 25mg daily, metoprolol XL 25mg daily   - Continue to hold PTA medications for now given orthostatic hypotension  - Continue PTA rosuvastatin      Anemia, normocytic, stable  Hx melena s/p EGD, 25  Hospital admission in February for dark stools and concerns of melena, for details see admission note. Not on AC, no previous GIB. + FH (brother  of colon CA).    - GI consulted during previous admission - Dr. Simons's team  - EGD  without any findings to explain melenic stools; biopsies sent for H.pylori                 - Colonoscopy  w/ normal ileum, colon normal, internal hemorrhoids.   - Anemia thought to be related to duodenal ulcer, but unclear. Plans to follow up with Kristyn WASHBURN for possible pill cam. Hgb remained stable. Occult stool testing negative.  - PPI daily  - Hgb during recent admission 13>12. Present admission 11.1. no overt bleeding but will need to be followed given orthostasis/weakness      Overactive Bladder  Follows with Dr. Butler (urology). Currently on Detrol, but still having urinary frequency, leaking. These symptoms are making it difficult to leave the house.   - Recommended follow up with Urology outpatient      Benign prostatic hypertrophy  - Continue home Flomax     Gastroesophageal reflux disease  - Continue protonix 40mg daily       History of herpes simplex virus infection of eyelid  - Continue prior to admission famciclovir and eye drops     History of cognitive impairment  - Continue donepezil     History of mood disorder  - Continue escitalopram    Clinically Significant Risk Factors                   # Hypertension: Noted on problem list     # Dementia: noted on problem list            # Financial/Environmental Concerns:             Diet: Regular Diet Adult     DVT Prophylaxis: Pneumatic Compression Devices and Ambulate every shift   Garcia Catheter: Not present  Lines: None     Cardiac Monitoring: None  Code Status: No CPR- Do NOT Intubate      Disposition Plan       Expected Discharge Date: 05/18/2025      Destination: inpatient rehabilitation facility  Discharge Comments: Orthostatic +   PT TCU MLM Sunday  SW - referrals sent      Entered: Melinda Mansfield MD 05/17/2025, 3:03 PM       Care Team Updated: Nursing and SW updated     Disposition: Tentatively  5/18/25 pending orthostatics       Medically Ready for Discharge: Anticipated Tomorrow        Melinda Mansfield MD  Hospitalist Service   [facility]  Securely message with the Vocera Web Console (learn more here)         Medical Decision Making       50 MINUTES SPENT BY ME on the date of service doing chart review, history, exam, documentation & further activities per the note.           Interval History     No acute overnight events.    This morning the patient states that he is doing okay. Finished breakfast. States that his dizziness has improved. States that he has dealt with intermittent dizziness for a while now     -Data reviewed today: I reviewed all new labs and imaging results over the last 24 hours.     Physical Exam   Temp: 99.4  F (37.4  C) Temp src: Oral BP: (!) 161/64 Pulse: 70   Resp: 17 SpO2: 94 % O2 Device: None (Room air)    There were no vitals filed for this visit.  Vital Signs with Ranges  Temp:  [99.2  F (37.3  C)-99.7  F  (37.6  C)] 99.4  F (37.4  C)  Pulse:  [66-71] 70  Resp:  [17-18] 17  BP: (131-162)/(54-64) 161/64  SpO2:  [94 %-96 %] 94 %  I/O last 3 completed shifts:  In: 570 [P.O.:570]  Out: 800 [Urine:800]      Constitutional: Awake, alert, cooperative, no apparent distress.    HEENT: PERRL, Normocephalic, without obvious abnormality, atraumatic, oral pharynx with moist mucus membranes  Pulmonary: No increased work of breathing, good air exchange, clear to auscultation bilaterally, no crackles or wheezing.  Cardiovascular: Regular rate and rhythm, normal S1 and S2  GI: Normal bowel sounds, soft, non-distended, non-tender.  Skin/Integumen: Visualized skin appeared clear  Psych:  Alert and oriented x 3.   Extremities: No lower extremity edema noted      Medications   Current Facility-Administered Medications   Medication Dose Route Frequency Provider Last Rate Last Admin     Current Facility-Administered Medications   Medication Dose Route Frequency Provider Last Rate Last Admin    cefpodoxime (VANTIN) tablet 200 mg  200 mg Oral BID Josephine Carney PA-C   200 mg at 05/17/25 0808    donepezil (ARICEPT) tablet 5 mg  5 mg Oral QPM Josephine Carney PA-C   5 mg at 05/16/25 2112    escitalopram (LEXAPRO) tablet 10 mg  10 mg Oral At Bedtime Josephine Carney PA-C   10 mg at 05/16/25 2113    fluorometholone (FML LIQUIFILM) 0.1 % ophthalmic susp 1 drop  1 drop Right Eye QAM Josephien Carney PA-C   1 drop at 05/17/25 0808    [Held by provider] furosemide (LASIX) half-tab 10 mg  10 mg Oral QAM Maria Teresa Verde PA-C        [Held by provider] losartan (COZAAR) tablet 25 mg  25 mg Oral Daily Maria Teresa Verde PA-C        metoprolol succinate ER (TOPROL XL) 24 hr tablet 25 mg  25 mg Oral Daily Josephine Carney PA-C   25 mg at 05/17/25 0808    pantoprazole (PROTONIX) EC tablet 40 mg  40 mg Oral Daily Josephine Carney PA-C   40 mg at 05/17/25 0808    polyethylene glycol-propylene glycol PF (SYSTANE  ULTRA PF) opthalmic solution 1 drop  1 drop Left Eye QAM Josephine Carney PA-C   1 drop at 05/17/25 0808    rosuvastatin (CRESTOR) tablet 10 mg  10 mg Oral Daily Maria Teresa Verde PA-C   10 mg at 05/17/25 0808    tamsulosin (FLOMAX) capsule 0.4 mg  0.4 mg Oral QPM Maria Teresa Verde PA-C   0.4 mg at 05/16/25 2113    tolterodine ER (DETROL LA) 24 hr capsule 2 mg  2 mg Oral QPM Josephine Carney PA-C   2 mg at 05/16/25 2113    valACYclovir (VALTREX) tablet 1,000 mg  1,000 mg Oral Q12H [facility] (08/20) Josephine Carney PA-C   1,000 mg at 05/17/25 0808       Data   Recent Labs   Lab 05/16/25  0930 05/15/25  0744 05/14/25  1002 05/13/25  0615 05/11/25  1650   WBC  --  8.2 10.1 7.8 9.0   HGB  --  12.3* 11.1* 12.0* 13.1*   MCV  --  96 98 95 96   PLT  --  179 160 182 193    140 139 138 141   POTASSIUM 4.2 4.5 4.2 4.0 4.4   CHLORIDE 106 109* 104 102 102   CO2 24 24 25 27 26   BUN 12.4 15.6 22.5 14.0 18.9   CR 0.87 0.94 1.29* 0.75 0.84   ANIONGAP 11 7 10 9 13   MARITZA 9.1 8.8 8.6* 8.9 9.0   * 96 100* 106* 95   ALBUMIN  --   --   --   --  3.6   PROTTOTAL  --   --   --   --  6.4   BILITOTAL  --   --   --   --  1.2   ALKPHOS  --   --   --   --  84   ALT  --   --   --   --  15   AST  --   --   --   --  16       No results found for this or any previous visit (from the past 24 hours).

## 2025-05-18 PROCEDURE — 250N000013 HC RX MED GY IP 250 OP 250 PS 637: Performed by: STUDENT IN AN ORGANIZED HEALTH CARE EDUCATION/TRAINING PROGRAM

## 2025-05-18 PROCEDURE — 99232 SBSQ HOSP IP/OBS MODERATE 35: CPT | Performed by: STUDENT IN AN ORGANIZED HEALTH CARE EDUCATION/TRAINING PROGRAM

## 2025-05-18 PROCEDURE — 120N000001 HC R&B MED SURG/OB

## 2025-05-18 PROCEDURE — 250N000013 HC RX MED GY IP 250 OP 250 PS 637: Performed by: PHYSICIAN ASSISTANT

## 2025-05-18 RX ORDER — VALACYCLOVIR HYDROCHLORIDE 1 G/1
1000 TABLET, FILM COATED ORAL 3 TIMES DAILY
Status: DISCONTINUED | OUTPATIENT
Start: 2025-05-18 | End: 2025-05-19 | Stop reason: HOSPADM

## 2025-05-18 RX ADMIN — ROSUVASTATIN CALCIUM 10 MG: 10 TABLET, FILM COATED ORAL at 09:08

## 2025-05-18 RX ADMIN — VALACYCLOVIR HYDROCHLORIDE 1000 MG: 1 TABLET, FILM COATED ORAL at 13:25

## 2025-05-18 RX ADMIN — FLUOROMETHOLONE 1 DROP: 1 SUSPENSION/ DROPS OPHTHALMIC at 09:07

## 2025-05-18 RX ADMIN — POLYETHYLENE GLYCOL 400 AND PROPYLENE GLYCOL 1 DROP: 4; 3 SOLUTION/ DROPS OPHTHALMIC at 09:08

## 2025-05-18 RX ADMIN — VALACYCLOVIR HYDROCHLORIDE 1000 MG: 1 TABLET, FILM COATED ORAL at 20:33

## 2025-05-18 RX ADMIN — VALACYCLOVIR HYDROCHLORIDE 1000 MG: 1 TABLET, FILM COATED ORAL at 09:07

## 2025-05-18 RX ADMIN — CEFPODOXIME PROXETIL 200 MG: 200 TABLET, FILM COATED ORAL at 09:07

## 2025-05-18 RX ADMIN — METOPROLOL SUCCINATE 25 MG: 25 TABLET, EXTENDED RELEASE ORAL at 09:07

## 2025-05-18 RX ADMIN — PANTOPRAZOLE SODIUM 40 MG: 40 TABLET, DELAYED RELEASE ORAL at 09:07

## 2025-05-18 RX ADMIN — TOLTERODINE 2 MG: 2 CAPSULE, EXTENDED RELEASE ORAL at 20:34

## 2025-05-18 RX ADMIN — ESCITALOPRAM OXALATE 10 MG: 10 TABLET ORAL at 20:33

## 2025-05-18 RX ADMIN — DONEPEZIL HYDROCHLORIDE 5 MG: 5 TABLET, FILM COATED ORAL at 20:34

## 2025-05-18 ASSESSMENT — ACTIVITIES OF DAILY LIVING (ADL)
ADLS_ACUITY_SCORE: 65
ADLS_ACUITY_SCORE: 71
ADLS_ACUITY_SCORE: 71
ADLS_ACUITY_SCORE: 65
ADLS_ACUITY_SCORE: 71
ADLS_ACUITY_SCORE: 65

## 2025-05-18 NOTE — PLAN OF CARE
Goal Outcome Evaluation:    PRIMARY Concern: Generalized weakness, positive orthostatics.   SAFETY RISK Concerns (fall risk, behaviors, etc.): Fall.     Aggression Tool Color: Green.   Isolation/Type: None.   Tests/Procedures for NEXT shift: Orthos   Consults? (Pending/following, signed-off?) PT rec TCU. SW/CC following.  Where is patient from? (Home, TCU, etc.): Home  Other Important info for NEXT shift: recently hospitalized from 5/11-5/13 with pneumonia, on PO antibiotics   Anticipated DC date & active delays: Pending, Has been accepted to [facility] _____________________________________________________________________________  SUMMARY NOTE:   Orientation/Cognitive: A&Ox4. Very pleasant  Observation Goals (Met/ Not Met): INPATIENT  Mobility Level/Assist Equipment: Up Ax1 GB/walker. Up in chair for meals  Antibiotics & Plan (IV/po, length of tx left): Vantin and Valacyclovir   Pain Management: Denies.  Complete Pain Reassessment: Y Due next: Next shift.  Tele/VS/O2: VSS on RA. Orthos   ABNL Lab/BG: Hgb 12.3  Diet: Regular, needs help ordering meals.  Bowel/Bladder: Incontinent of urine. External cath in place. Up to commode for Bms   Skin Concerns: Blanchable sacrum, mepilex in place CDI. Bruising and scabs to KAVEH shins.   Drains/Devices: PIV SL.

## 2025-05-18 NOTE — PROGRESS NOTES
[facility]  Hospitalist Progress Note    Assessment & Plan   Riki Alvarez is a 89 year old male with a past medical history significant for hypertension, hyperlipidemia, cognitive impairment, gastroesophageal reflux disease, benign prostatic hypertrophy, mood disorder, recent admission for CAP admitted on 5/14/2025 after presenting with generalized weakness.      Generalized Weakness   Orthostatic Hypotension: Improving  During recent admission to hospital was evaluated by PT who recommended home with family assist and home PT/RN. Once he returned home he felt increasingly weak and was unable to ambulate without family assist. Ultimately he could not get out of bed on his own and he returned to the ED.     Orthostatics in the ED were positive with 60 point systolic drip with associated symptoms, improving to 25 mmHg drop with IVF and holding cardiac medications and anticholinergics.  Patient continues to have positive orthostatics but is asymptomatic (no lightheadedness or dizziness).     On 5/18/25 his SBP laying was 180, sitting was 160 and standing was 112 while wearing compression stockings. Patient appeared weak but did not complain of dizziness or lightheadedness. Though he was asymptomatic, did not feel comfortable discharging his with that big of drop. He does mention that he has been dealing with intermittent dizziness for a while now.      - S/p IVF     - IF patient has positive orthostats but has NO symptoms would hold off on fluids      - Continue to hold PTA BP meds   - May need to stop these at discharge or slowly reintroduce as he improves     - Continue home Metoprolol     - Hold PTA Flomax   - This was restarted on he received a dose on 5/17 and 5/18. This could be causing the bigger drops in SBP. Will hold this and let it wash out of his system.     - Could consider abdominal binder    - PT recommending TCU    - SW consulted  - Pt lives independently PTA. Home care  was ordered on discharge but had not yet established care. Referrals sent for TCU     LADY: Resolved   Suspect hypovolemia as he has had poor po intake. Cr on discharge  was 0.75 and is now 1.29 on admission. s/p 1L bolus in the ED, continued maintenance for additional 500cc with improvement to 0.94.  - Continue to hold PTA losartan, furosemide  - Trend BMP PRN     Community acquired pneumonia  Recently admitted to [facility] from 25-25 after presenting with cough and weakness x1 week.   Received ceftriaxone and azithromycin in the hospital. He remained afebrile without leukocytosis and did not require oxygen. He was discharged on Vantin BID and azithromycin to complete course. Symptoms improved at time of discharge and are minimal at this time. On admission he is afebrile with stable oxygen sats and normal WBC.  - Completed a course of Azithromycin and Cefopdoxime     Anxiety with Recent Panic Symptoms  History of bereavement  History of insomnia  Wife passed away 2024. Pt has had intermittent exacerbated anxiety symptoms since but have been worse recently. Met with PCP 25, started on low dose lorazepam.  - Hold PTA lorazepam given fall risk, weakness      Essential hypertension  Hyperlipidemia  PTA: furosemide 10mg daily, losartan 25mg daily, metoprolol XL 25mg daily   - Continue to hold PTA medications for now given orthostatic hypotension  - Continue PTA rosuvastatin      Anemia, normocytic, stable  Hx melena s/p EGD, 25  Hospital admission in February for dark stools and concerns of melena, for details see admission note. Not on AC, no previous GIB. + FH (brother  of colon CA).    - GI consulted during previous admission - Dr. Simons's team  - EGD  without any findings to explain melenic stools; biopsies sent for H.pylori                 - Colonoscopy  w/ normal ileum, colon normal, internal hemorrhoids.   - Anemia thought to be related to duodenal ulcer, but unclear. Plans to  follow up with Kristyn WASHBURN for possible pill cam. Hgb remained stable. Occult stool testing negative.  - PPI daily  - Hgb during recent admission 13>12. Present admission 11.1. no overt bleeding but will need to be followed given orthostasis/weakness      Overactive Bladder  Follows with Dr. Butler (urology). Currently on Detrol, but still having urinary frequency, leaking. These symptoms are making it difficult to leave the house.   - Recommended follow up with Urology outpatient      Benign prostatic hypertrophy  - Hold home Flomax    - May need stop completely at discharge      Gastroesophageal reflux disease  - Continue protonix 40mg daily      History of herpes simplex virus infection of eyelid  - Continue prior to admission famciclovir and eye drops     History of cognitive impairment  - Continue donepezil     History of mood disorder  - Continue escitalopram    Clinically Significant Risk Factors                   # Hypertension: Noted on problem list     # Dementia: noted on problem list            # Financial/Environmental Concerns:             Diet: Regular Diet Adult     DVT Prophylaxis: Pneumatic Compression Devices and Ambulate every shift   Garcia Catheter: Not present  Lines: None     Cardiac Monitoring: None  Code Status: No CPR- Do NOT Intubate      Disposition Plan       Expected Discharge Date: 05/19/2025      Destination: inpatient rehabilitation facility  Discharge Comments: Orthostatic +   PT TCU MLM Sunday  SW - referrals sent      Entered: Melinda Mansfield MD 05/18/2025, 2:21 PM       Family Updated: Attempted to call Darinel Castillo with no answer. Voicemail left on 5/18/25    Care Team Updated: Nursing and SW updated     Disposition: Tentatively tomorrow pending improvement in blood pressures.       Medically Ready for Discharge: Anticipated Tomorrow        Melinda Mansfield MD  Hospitalist Service   [facility]  Securely message with the Vocera Web Console  (learn more here)         Medical Decision Making       45 MINUTES SPENT BY ME on the date of service doing chart review, history, exam, documentation & further activities per the note.           Interval History     No acute overnight events    This morning the patient states that he is feeling fine. He continues to feel weak. Feels that he is eating well.     -Data reviewed today: I reviewed all new labs and imaging results over the last 24 hours.     Physical Exam   Temp: 99  F (37.2  C) Temp src: Oral BP: (!) 142/74 Pulse: 65   Resp: 18 SpO2: 96 % O2 Device: None (Room air)    There were no vitals filed for this visit.  Vital Signs with Ranges  Temp:  [99  F (37.2  C)-99.3  F (37.4  C)] 99  F (37.2  C)  Pulse:  [62-65] 65  Resp:  [16-18] 18  BP: (138-142)/(62-74) 142/74  SpO2:  [96 %] 96 %  I/O last 3 completed shifts:  In: 570 [P.O.:570]  Out: 2300 [Urine:2300]      Constitutional: Cooperative, no apparent distress. Frail, fatigued appearing  HEENT: PERRL, Normocephalic, without obvious abnormality, atraumatic, oral pharynx with moist mucus membranes  Pulmonary: No increased work of breathing, good air exchange, clear to auscultation bilaterally, no crackles or wheezing.  Cardiovascular: Regular rate and rhythm, normal S1 and S2  GI: Normal bowel sounds, soft, non-distended, non-tender.  Skin/Integumen: Visualized skin appeared clear  Psych:  Alert and oriented x 3.   Extremities: No lower extremity edema noted      Medications   Current Facility-Administered Medications   Medication Dose Route Frequency Provider Last Rate Last Admin     Current Facility-Administered Medications   Medication Dose Route Frequency Provider Last Rate Last Admin    donepezil (ARICEPT) tablet 5 mg  5 mg Oral QPM Josephine Carney PA-C   5 mg at 05/17/25 2141    escitalopram (LEXAPRO) tablet 10 mg  10 mg Oral At Bedtime Josephine Careny PA-C   10 mg at 05/17/25 2141    fluorometholone (FML LIQUIFILM) 0.1 % ophthalmic susp 1  drop  1 drop Right Eye QAM Josephine Carney PA-C   1 drop at 05/18/25 0907    [Held by provider] furosemide (LASIX) half-tab 10 mg  10 mg Oral QAM Maria Teresa Verde PA-C        [Held by provider] losartan (COZAAR) tablet 25 mg  25 mg Oral Daily Maria Teresa Verde PA-C        metoprolol succinate ER (TOPROL XL) 24 hr tablet 25 mg  25 mg Oral Daily Melinda Mansfield MD   25 mg at 05/18/25 0907    pantoprazole (PROTONIX) EC tablet 40 mg  40 mg Oral Daily Josephine Carney PA-C   40 mg at 05/18/25 0907    polyethylene glycol-propylene glycol PF (SYSTANE ULTRA PF) opthalmic solution 1 drop  1 drop Left Eye QAM Josephine Carney PA-C   1 drop at 05/18/25 0908    rosuvastatin (CRESTOR) tablet 10 mg  10 mg Oral Daily Maria Teresa Verde PA-C   10 mg at 05/18/25 0908    [Held by provider] tamsulosin (FLOMAX) capsule 0.4 mg  0.4 mg Oral QPM Maria Teresa Verde PA-C   0.4 mg at 05/17/25 2141    tolterodine ER (DETROL LA) 24 hr capsule 2 mg  2 mg Oral QPM Josephine Carney PA-C   2 mg at 05/17/25 2141    valACYclovir (VALTREX) tablet 1,000 mg  1,000 mg Oral TID Melinda Mansfield MD   1,000 mg at 05/18/25 1325       Data   Recent Labs   Lab 05/16/25  0930 05/15/25  0744 05/14/25  1002 05/13/25  0615 05/11/25  1650   WBC  --  8.2 10.1 7.8 9.0   HGB  --  12.3* 11.1* 12.0* 13.1*   MCV  --  96 98 95 96   PLT  --  179 160 182 193    140 139 138 141   POTASSIUM 4.2 4.5 4.2 4.0 4.4   CHLORIDE 106 109* 104 102 102   CO2 24 24 25 27 26   BUN 12.4 15.6 22.5 14.0 18.9   CR 0.87 0.94 1.29* 0.75 0.84   ANIONGAP 11 7 10 9 13   MARITZA 9.1 8.8 8.6* 8.9 9.0   * 96 100* 106* 95   ALBUMIN  --   --   --   --  3.6   PROTTOTAL  --   --   --   --  6.4   BILITOTAL  --   --   --   --  1.2   ALKPHOS  --   --   --   --  84   ALT  --   --   --   --  15   AST  --   --   --   --  16       No results found for this or any previous visit (from the past 24 hours).

## 2025-05-18 NOTE — PLAN OF CARE
Goal Outcome Evaluation:     PRIMARY Concern: Generalized weakness, positive orthostatics.   SAFETY RISK Concerns (fall risk, behaviors, etc.): Fall.     Aggression Tool Color: Green.   Isolation/Type: None.   Tests/Procedures for NEXT shift: Orthos q 8 hrs  Consults? (Pending/following, signed-off?) PT rec TCU. SW/CC following.  Where is patient from? (Home, TCU, etc.): Home  Other Important info for NEXT shift: recently hospitalized from 5/11-5/13 with pneumonia, on PO antibiotics   Anticipated DC date & active delays: [facility] been accepted to [facility] for Sunday 5/18/25, time requested for 2pm but the earliest available ride for tomorrow is [id number]  _____________________________________________________________________________  SUMMARY NOTE:   Orientation/Cognitive: A&Ox4. Very pleasant  Observation Goals (Met/ Not Met): INPATIENT  Mobility Level/Assist Equipment: Up Ax1 GB/walker. Up in chair for meals  Antibiotics & Plan (IV/po, length of tx left):  Vantin and Valacyclovir   Pain Management: Denies.  Complete Pain Reassessment: Y Due next: Next shift.  Tele/VS/O2: VSS on RA. Orthos   ABNL Lab/BG: Hgb 12.3  Diet: Regular, needs help ordering meals.  Bowel/Bladder: Incontinent of urine and stool. External cath in place.  Skin Concerns: Blanchable sacrum, mepilex in place CDI. Bruising and scabs to KAVEH shins.   Drains/Devices: PIV SL. Pt had 500 ml LR bolus.

## 2025-05-18 NOTE — PROGRESS NOTES
Care Management Follow Up    Length of Stay (days): 3    Expected Discharge Date: 05/19/2025     Concerns to be Addressed:       Patient plan of care discussed at interdisciplinary rounds: Yes    Anticipated Discharge Disposition: Transitional Care              Anticipated Discharge Services: Transportation Services  Anticipated Discharge DME:      Patient/family educated on Medicare website which has current facility and service quality ratings: yes  Education Provided on the Discharge Plan:    Patient/Family in Agreement with the Plan: yes    Referrals Placed by CM/SW:    Private pay costs discussed: Not applicable    Discussed  Partnership in Safe Discharge Planning  document with patient/family: No     Handoff Completed: No, handoff not indicated or clinically appropriate    Additional Information:  Writer spoke with doctor who states pt is not medically stable. Doctor would like writer to cancel today's discharge/ transport. Doctor is agreeable to transport set up for tomorrow but will have to assess tomorrow to see if pt medically stable as she is not quite sure yet. Writer agreeable to cancel and reset ride.   Writer spoke with Stephanie at [facility] transport. Ride for today is cancelled and rescheduled for tomorrow at 207pm-250pm.  Writer called pt's santana Castillo and updated her of no discharge today. Anna states understanding. Anna asked if  doctor can give her a call. Writer agreeable to relay this request to doctor. Writer updated Doctor Donal of Anna's request for a call.  Writer updated [facility] admissions of no discharge today but discharge time set for tomorrow.     Next Steps: awaiting medical stability.     SALVATORE Painting  Social Work  [facility]

## 2025-05-19 ENCOUNTER — LAB REQUISITION (OUTPATIENT)
Dept: LAB | Facility: CLINIC | Age: OVER 89
End: 2025-05-19
Payer: MEDICARE

## 2025-05-19 ENCOUNTER — DOCUMENTATION ONLY (OUTPATIENT)
Dept: GERIATRICS | Facility: CLINIC | Age: OVER 89
End: 2025-05-19
Payer: MEDICARE

## 2025-05-19 VITALS
DIASTOLIC BLOOD PRESSURE: 73 MMHG | SYSTOLIC BLOOD PRESSURE: 177 MMHG | TEMPERATURE: 98 F | OXYGEN SATURATION: 96 % | RESPIRATION RATE: 17 BRPM | HEART RATE: 67 BPM

## 2025-05-19 DIAGNOSIS — Z11.1 ENCOUNTER FOR SCREENING FOR RESPIRATORY TUBERCULOSIS: ICD-10-CM

## 2025-05-19 PROCEDURE — 250N000013 HC RX MED GY IP 250 OP 250 PS 637: Performed by: PHYSICIAN ASSISTANT

## 2025-05-19 PROCEDURE — 99239 HOSP IP/OBS DSCHRG MGMT >30: CPT | Performed by: INTERNAL MEDICINE

## 2025-05-19 PROCEDURE — 250N000013 HC RX MED GY IP 250 OP 250 PS 637: Performed by: STUDENT IN AN ORGANIZED HEALTH CARE EDUCATION/TRAINING PROGRAM

## 2025-05-19 RX ADMIN — VALACYCLOVIR HYDROCHLORIDE 1000 MG: 1 TABLET, FILM COATED ORAL at 14:03

## 2025-05-19 RX ADMIN — FLUOROMETHOLONE 1 DROP: 1 SUSPENSION/ DROPS OPHTHALMIC at 08:53

## 2025-05-19 RX ADMIN — ROSUVASTATIN CALCIUM 10 MG: 10 TABLET, FILM COATED ORAL at 08:46

## 2025-05-19 RX ADMIN — VALACYCLOVIR HYDROCHLORIDE 1000 MG: 1 TABLET, FILM COATED ORAL at 08:46

## 2025-05-19 RX ADMIN — PANTOPRAZOLE SODIUM 40 MG: 40 TABLET, DELAYED RELEASE ORAL at 08:46

## 2025-05-19 RX ADMIN — POLYETHYLENE GLYCOL 400 AND PROPYLENE GLYCOL 1 DROP: 4; 3 SOLUTION/ DROPS OPHTHALMIC at 08:46

## 2025-05-19 RX ADMIN — METOPROLOL SUCCINATE 25 MG: 25 TABLET, EXTENDED RELEASE ORAL at 08:46

## 2025-05-19 ASSESSMENT — ACTIVITIES OF DAILY LIVING (ADL)
ADLS_ACUITY_SCORE: 71

## 2025-05-19 NOTE — PLAN OF CARE
Physical Therapy Discharge Summary    Reason for therapy discharge:    Discharged to transitional care facility.    Progress towards therapy goal(s). See goals on Care Plan in [facility] electronic health record for goal details.  Goals partially met.  Barriers to achieving goals:   discharge from facility.    Therapy recommendation(s):    Continued therapy is recommended.  Rationale/Recommendations: Patient below baseline functional mobility. Presents with weakness, orthostatic hypotension, reduced activity tolerance, and impaired balance resulting in the need for CGA and FWW to safely mobilize. Patient lives alone in house, has nieces who stop over daily. At this time, recommend discharge to TCU for improvement of strength, activity tolerance, and independence with functional mobility. Pending progress with further medical management and IP therapies, may be able to discharge home if patient can progress to modified IND with walker and assist from his niece for IADL's, would also need HH PT to address aforementioned functional deficits.  PT Brief overview of current status: CGA for mobility with walker. Goals of therapy will be to address safe mobility and make recs for d/c to next level of care. Pt and RN will continue to follow all falls risk precautions as documented by RN staff while hospitalized  PT Total Distance Amb During Session (feet): 300    Recommendation above provided by last treating therapist.

## 2025-05-19 NOTE — PROGRESS NOTES
Care Management Discharge Note    Discharge Date: 05/19/2025       Discharge Disposition: Transitional Care    Discharge Services: Transportation Services    Discharge DME:      Discharge Transportation: [facility] wheelchair [phone]    Private pay costs discussed: private room/amenity fees and transportation costs    Does the patient's insurance plan have a 3 day qualifying hospital stay waiver?  No    PAS Confirmation Code:  [id number]   Patient/family educated on Medicare website which has current facility and service quality ratings: yes    Education Provided on the Discharge Plan:    Persons Notified of Discharge Plans: Hospitalist, CHANI, Charge RN, bedside RN, santana   Patient/Family in Agreement with the Plan: yes    Handoff Referral Completed: No, handoff not indicated or clinically appropriate    Additional Information:  Patient is discharging to [facility] today in a shared room. Santana Castillo updated and in agreement. Discharge orders faxed to TCU. PAS previously completed. [facility] wheelchair ride arranged for [phone] today.    ASTRID Joshua  Social Work  [facility]

## 2025-05-19 NOTE — PLAN OF CARE
PRIMARY Concern: Generalized weakness, positive orthostatics.   SAFETY RISK Concerns (fall risk, behaviors, etc.): Fall risk  Aggression Tool Color: Green.   Isolation/Type: None.   Tests/Procedures for NEXT shift: Orthos   Consults? (Pending/following, signed-off?) PT rec TCU. SW/CC following.  Where is patient from? (Home, TCU, etc.): Home  Other Important info for NEXT shift: Recent pneumonia, completed abx.  Anticipated DC date & active delays: Anticipated discharge tomorrow 5/19, has been accepted to [facility].  Tentative ride set up for 1400. _____________________________________________________________________________  SUMMARY NOTE:   Orientation/Cognitive: A&O x 4, pleasant  Observation Goals (Met/ Not Met): Inpatient  Mobility Level/Assist Equipment:  Ax1 GB/walker.  Antibiotics & Plan (IV/po, length of tx left): none  Pain Management: Denies.  Complete Pain Reassessment: yes. Due next: Next shift.  Tele/VS/O2: VSS on RA. Orthos   ABNL Lab/BG: None new  Diet: Regular, needs help ordering meals.  Bowel/Bladder: Incontinent of urine. External cath in place. Up to commode for Bms, no BM this shift.   Skin Concerns: Blanchable redness to sacrum, mepilex in place CDI. Bruising and scabs to bilateral shins.   Drains/Devices: PIV R arm saline locked.

## 2025-05-19 NOTE — DISCHARGE SUMMARY
[facility]  Hospitalist Discharge Summary      Date of Admission:  5/14/2025  Date of Discharge:  5/19/2025  Discharging Provider: Johnny Ferreira MD  Discharge Service: Hospitalist Service    Discharge Diagnoses        Generalized Weakness   Orthostatic Hypotension: Improving       LADY: Resolved        Community acquired pneumonia       Anxiety with Recent Panic Symptoms  History of bereavement  History of insomnia       Essential hypertension  Hyperlipidemia       Anemia, normocytic, stable  Hx melena s/p EGD, 2/13/25       Overactive Bladder       Benign prostatic hypertrophy       Gastroesophageal reflux disease       History of herpes simplex virus infection of eyelid       History of cognitive impairment       History of mood disorder       Clinically Significant Risk Factors          Follow-ups Needed After Discharge   Follow-up Appointments       Follow Up and recommended labs and tests      Follow up with detention physician.  The following labs/tests are recommended: CBC and BMP.            {Additional follow-up instructions/to-do's for PCP       Unresulted Labs Ordered in the Past 30 Days of this Admission       No orders found from 4/14/2025 to 5/15/2025.        These results will be followed up by PCP    Discharge Disposition   Discharged to rehabilitation facility  Condition at discharge: Stable    Hospital Course   Riki Alvarez is a 89 year old male with a past medical history significant for hypertension, hyperlipidemia, cognitive impairment, gastroesophageal reflux disease, benign prostatic hypertrophy, mood disorder, recent admission for CAP admitted on 5/14/2025 after presenting with generalized weakness.      Generalized Weakness   Orthostatic Hypotension: Improving  During recent admission to hospital was evaluated by PT who recommended home with family assist and home PT/RN. Once he returned home he felt increasingly weak and was unable to ambulate  without family assist. Ultimately he could not get out of bed on his own and he returned to the ED.      Orthostatics in the ED were positive with 60 point systolic drip with associated symptoms, improving to 25 mmHg drop with IVF and holding cardiac medications and anticholinergics.  Patient continues to have positive orthostatics but is asymptomatic (no lightheadedness or dizziness).      On 5/18/25 his SBP laying was 180, sitting was 160 and standing was 112 while wearing compression stockings. Patient appeared weak but did not complain of dizziness or lightheadedness. Though he was asymptomatic, did not feel comfortable discharging his with that big of drop. He does mention that he has been dealing with intermittent dizziness for a while now.      - S/p IVF      - IF patient has positive orthostats but has NO symptoms would hold off on fluids      - Continue to hold PTA BP meds including losartan and lasix               - May need to stop these at discharge or slowly reintroduce as he improves      - Continue home Metoprolol      - discontinue Flomax               - This was restarted on he received a dose on 5/17 and 5/18. This could be causing the bigger drops in SBP. Will hold this and let it wash out of his system.      - Could consider abdominal binder     - PT recommending TCU     - SW consulted  - Pt lives independently PTA. Home care was ordered on discharge but had not yet established care. Referrals sent for TCU     LADY: Resolved   Suspect hypovolemia as he has had poor po intake. Cr on discharge 5/13 was 0.75 and is now 1.29 on admission. s/p 1L bolus in the ED, continued maintenance for additional 500cc with improvement to 0.94.  - Continue to hold PTA losartan, furosemide to be restarted in the future  - Trend BMP PRN     Community acquired pneumonia  Recently admitted to [facility] from 5/11/25-5/13/25 after presenting with cough and weakness x1 week.   Received ceftriaxone and azithromycin in the  hospital. He remained afebrile without leukocytosis and did not require oxygen. He was discharged on Vantin BID and azithromycin to complete course. Symptoms improved at time of discharge and are minimal at this time. On admission he is afebrile with stable oxygen sats and normal WBC.  - Completed a course of Azithromycin and Cefopdoxime     Anxiety with Recent Panic Symptoms  History of bereavement  History of insomnia  Wife passed away 2024. Pt has had intermittent exacerbated anxiety symptoms since but have been worse recently. Met with PCP 25, started on low dose lorazepam.  - Hold PTA lorazepam given fall risk, weakness      Essential hypertension  Hyperlipidemia  PTA: furosemide 10mg daily, losartan 25mg daily, metoprolol XL 25mg daily   - Continue to hold PTA medications including furosemide and losartan but to continue metoprolol  - Continue PTA rosuvastatin      Anemia, normocytic, stable  Hx melena s/p EGD, 25  Hospital admission in February for dark stools and concerns of melena, for details see admission note. Not on AC, no previous GIB. + FH (brother  of colon CA).    - GI consulted during previous admission - Dr. Simons's team  - EGD  without any findings to explain melenic stools; biopsies sent for H.pylori                 - Colonoscopy  w/ normal ileum, colon normal, internal hemorrhoids.   - Anemia thought to be related to duodenal ulcer, but unclear. Plans to follow up with [facility] for possible pill cam. Hgb remained stable. Occult stool testing negative.  - PPI daily  - Hgb during recent admission 13>12. Present admission 11.1. no overt bleeding but will need to be followed given orthostasis/weakness      Overactive Bladder  Follows with Dr. Butler (urology). Currently on Detrol, but still having urinary frequency, leaking. These symptoms are making it difficult to leave the house.   - Recommended follow up with Urology outpatient      Benign prostatic  hypertrophy  - discontinue Flomax                  Gastroesophageal reflux disease  - Continue protonix 40mg daily      History of herpes simplex virus infection of eyelid  - Continue prior to admission famciclovir and eye drops     History of cognitive impairment  - Continue donepezil     History of mood disorder  - Continue escitalopram     Clinically Significant Risk Factors                   # Hypertension: Noted on problem list     # Dementia: noted on problem list             # Financial/Environmental Concerns:               Diet: Regular Diet Adult     DVT Prophylaxis: Pneumatic Compression Devices and Ambulate every shift   Garcia Catheter: Not present  Lines: None     Cardiac Monitoring: None  Code Status: No CPR- Do NOT Intubate       Disposition Plan     Expected Discharge Date: 05/19/2025      Destination: inpatient rehabilitation facility  Discharge Comments: Orthostatic +   PT TCU MLM Sunday    Consultations This Hospital Stay   PHYSICAL THERAPY ADULT IP CONSULT  CARE MANAGEMENT / SOCIAL WORK IP CONSULT  CARE MANAGEMENT / SOCIAL WORK IP CONSULT  PHYSICAL THERAPY ADULT IP CONSULT  OCCUPATIONAL THERAPY ADULT IP CONSULT    Code Status   No CPR- Do NOT Intubate    Time Spent on this Encounter   I, Johnny Ferreira MD, personally saw the patient today and spent greater than 30 minutes discharging this patient.       Johnny Ferreira MD  [facility]  [address] [place] [id number]  Phone: [phone]  ______________________________________________________________________    Physical Exam   Vital Signs: Temp: 98  F (36.7  C) Temp src: Oral BP: (!) 177/73 Pulse: 67   Resp: 17 SpO2: 96 % O2 Device: None (Room air)    Weight: 0 lbs 0 oz         Primary Care Physician   Karolyn Aiken    Discharge Orders      General info for SNF    Length of Stay Estimate: Short Term Care: Estimated # of Days <30  Condition at Discharge: Stable  Level of  care:skilled   Rehabilitation Potential: Fair  Admission H&P remains valid and up-to-date: Yes  Recent Chemotherapy: N/A  Use Nursing Home Standing Orders: Yes     Mantoux instructions    Give two-step Mantoux (PPD) Per Facility Policy Yes     Follow Up and recommended labs and tests    Follow up with intermediate physician.  The following labs/tests are recommended: CBC and BMP.     Reason for your hospital stay    You were admitted to the hospital for generalized weakness and orthostatic hypotension.     Activity - Up with nursing assistance     No CPR- Do NOT Intubate     Physical Therapy Adult Consult    Evaluate and treat as clinically indicated.    Reason:  Generalized Weakness     Occupational Therapy Adult Consult    Evaluate and treat as clinically indicated.    Reason:  Generalized Weakness     Fall precautions     Diet    Follow this diet upon discharge: Regular Diet Adult       Significant Results and Procedures   Most Recent 3 CBC's:  Recent Labs   Lab Test 05/15/25  0744 05/14/25  1002 05/13/25  0615   WBC 8.2 10.1 7.8   HGB 12.3* 11.1* 12.0*   MCV 96 98 95    160 182     Most Recent 3 BMP's:  Recent Labs   Lab Test 05/16/25  0930 05/15/25  0744 05/14/25  1002    140 139   POTASSIUM 4.2 4.5 4.2   CHLORIDE 106 109* 104   CO2 24 24 25   BUN 12.4 15.6 22.5   CR 0.87 0.94 1.29*   ANIONGAP 11 7 10   MARITZA 9.1 8.8 8.6*   * 96 100*   ,   Results for orders placed or performed during the hospital encounter of 05/14/25   XR Chest 2 Views    Narrative    EXAM: XR CHEST 2 VIEWS  LOCATION: [facility]  DATE: 5/14/2025    INDICATION: recent pneumonia  COMPARISON: Chest radiograph 5/11/2025      Impression    IMPRESSION: Stable size of cardiomediastinal silhouette. No significant change in appearance of patchy airspace disease in the left lower lobe. No new airspace consolidation, pleural effusion or pneumothorax. Bones are unchanged.       Discharge Medications   Current  Discharge Medication List        CONTINUE these medications which have NOT CHANGED    Details   acetaminophen (TYLENOL) 325 MG tablet Take 2 tablets (650 mg) by mouth every 4 hours as needed for mild pain or other (and adjunct with moderate or severe pain or per patient request).    Associated Diagnoses: Dyspnea on exertion      donepezil (ARICEPT) 5 MG tablet Take 1 tablet (5 mg) by mouth every evening.    Associated Diagnoses: Mild dementia associated with other underlying disease, without behavioral disturbance, psychotic disturbance, mood disturbance, or anxiety (H)      escitalopram (LEXAPRO) 10 MG tablet Take 10 mg by mouth at bedtime.      famciclovir (FAMVIR) 500 MG tablet Take 1 tablet (500 mg) by mouth 2 times daily.    Associated Diagnoses: HSV (herpes simplex virus) infection of eyelid      fluorometholone (FLAREX) 0.1 % ophthalmic suspension Place 1 drop into the right eye every morning.    Associated Diagnoses: HSV (herpes simplex virus) infection of eyelid      metoprolol succinate ER (TOPROL XL) 25 MG 24 hr tablet Take 1 tablet (25 mg) by mouth daily.    Associated Diagnoses: Benign essential hypertension      pantoprazole (PROTONIX) 40 MG EC tablet Take 1 tablet (40 mg) by mouth daily.  Qty: 30 tablet, Refills: 0    Associated Diagnoses: Anemia, unspecified type      !! polyethylene glycol-propylene glycol (SYSTANE ULTRA) 0.4-0.3 % SOLN ophthalmic solution Place 1 drop Into the left eye every morning.      !! polyethylene glycol-propylene glycol (SYSTANE ULTRA) 0.4-0.3 % SOLN ophthalmic solution Place 1 drop Into the left eye daily as needed for dry eyes.      rosuvastatin (CRESTOR) 10 MG tablet Take 1 tablet (10 mg) by mouth daily.    Associated Diagnoses: Hyperlipidemia LDL goal <130      tolterodine ER (DETROL LA) 2 MG 24 hr capsule Take 2 mg by mouth every evening.       !! - Potential duplicate medications found. Please discuss with provider.        STOP taking these medications        azithromycin (ZITHROMAX) 250 MG tablet Comments:   Reason for Stopping:         cefpodoxime (VANTIN) 200 MG tablet Comments:   Reason for Stopping:         furosemide (LASIX) 20 MG tablet Comments:   Reason for Stopping:         LORazepam (ATIVAN) 0.5 MG tablet Comments:   Reason for Stopping:         losartan (COZAAR) 25 MG tablet Comments:   Reason for Stopping:         tamsulosin (FLOMAX) 0.4 MG capsule Comments:   Reason for Stopping:             Allergies   No Known Allergies

## 2025-05-19 NOTE — PROGRESS NOTES
[facility] GERIATRICS    PRIMARY CARE PROVIDER AND CLINIC:  Karolyn Aiken MD, [address] / [facility] [address]  Chief Complaint   Patient presents with    [facility] Medical Record Number:  [id number]  Place of Service where encounter took place:  [facility] (TCU) [[id number]]    Riki Alvarez  is a 89 year old  (7/3/1934), admitted to the above facility from  [facility]. Hospital stay 5/14/25 through 5/19/25..     HPI:      PMH: hypertension, hyperlipidemia, cognitive impairment, gastroesophageal reflux disease, benign prostatic hypertrophy, mood disorder     During recent admission to [facility] 5/11-5/13/25 with CAP, treated with course of azithromycin and cefpodoxime. Was evaluated by PT who recommended home with family assist and home PT/RN. Once he returned home he felt increasingly weak and was unable to ambulate without family assist. Ultimately he could not get out of bed on his own and he returned to the ED.     Admitted to [facility] 5/14-5/19/25 due to weakness with orthostatic hypotension. BP improved w/IV fluids and holding cardiac medications and anticholinergics. Ongoing orthostatic hypotension with improvement in readings; asymptomatic. PTA losartan and lasix continue to be on hold, restarted on metoprolol. PTA flomax dc'd and held PTA ativan.     Transferred to [facility] on 5/19/25.      Today's concerns:    During exam, patient seen sitting in bed. Reports doing well. Has been participating in therapy. PTA was ambulating w/walker, now requires assist x 2 with transfers. Admits to variable intake w/meals. Endorses having loose stools today; denies abdominal pain, nausea. Denies chest pain, SOB, headache, syncope.      CODE STATUS/ADVANCE DIRECTIVES DISCUSSION:  No CPR- Do NOT Intubate   ALLERGIES: No Known Allergies   PAST MEDICAL HISTORY:   Past Medical History:   Diagnosis Date    Anxiety     BPH (benign prostatic  hyperplasia)     Hiatal hernia     Hyperlipidemia     Hypertension     Mild dementia (H)       PAST SURGICAL HISTORY:   has a past surgical history that includes Esophagoscopy, gastroscopy, duodenoscopy (EGD), combined (N/A, 2/13/2025) and Colonoscopy (N/A, 2/14/2025).  FAMILY HISTORY: family history includes Colon Cancer (age of onset: 60) in his brother.  SOCIAL HISTORY:   reports that he has quit smoking. His smoking use included cigarettes. He has never used smokeless tobacco. He reports that he does not currently use alcohol. He reports that he does not use drugs.  Patient's living condition: lives alone    Post Discharge Medication Reconciliation Status:   MED REC REQUIRED  Post Medication Reconciliation Status: discharge medications reconciled and changed, per note/orders       Current Outpatient Medications   Medication Sig Dispense Refill    acetaminophen (TYLENOL) 325 MG tablet Take 2 tablets (650 mg) by mouth every 4 hours as needed for mild pain or other (and adjunct with moderate or severe pain or per patient request).      donepezil (ARICEPT) 5 MG tablet Take 1 tablet (5 mg) by mouth every evening.      escitalopram (LEXAPRO) 10 MG tablet Take 10 mg by mouth at bedtime.      famciclovir (FAMVIR) 500 MG tablet Take 1 tablet (500 mg) by mouth 2 times daily.      fluorometholone (FLAREX) 0.1 % ophthalmic suspension Place 1 drop into the right eye every morning.      metoprolol succinate ER (TOPROL XL) 25 MG 24 hr tablet Take 1 tablet (25 mg) by mouth daily.      pantoprazole (PROTONIX) 40 MG EC tablet Take 1 tablet (40 mg) by mouth daily. 30 tablet 0    polyethylene glycol-propylene glycol (SYSTANE ULTRA) 0.4-0.3 % SOLN ophthalmic solution Place 1 drop Into the left eye every morning.      polyethylene glycol-propylene glycol (SYSTANE ULTRA) 0.4-0.3 % SOLN ophthalmic solution Place 1 drop Into the left eye daily as needed for dry eyes.      rosuvastatin (CRESTOR) 10 MG tablet Take 1 tablet (10 mg) by  "mouth daily.      tolterodine ER (DETROL LA) 2 MG 24 hr capsule Take 2 mg by mouth every evening.       No current facility-administered medications for this visit.       ROS:  10 point ROS of systems including Constitutional, Eyes, Respiratory, Cardiovascular, Gastroenterology, Genitourinary, Integumentary, Musculoskeletal, Psychiatric were all negative except for pertinent positives noted in my HPI.      Vitals:  BP (!) 155/89   Pulse 70   Temp 98  F (36.7  C)   Resp 18   Ht 1.778 m (5' 10\")   Wt 66.6 kg (146 lb 12.8 oz)   SpO2 95%   BMI 21.06 kg/m    Exam:  GENERAL APPEARANCE:  Alert, in no distress, oriented, thin, cooperative  ENT:  Mouth and posterior oropharynx normal, moist mucous membranes, [place]  EYES:  EOM, conjunctivae, lids, pupils and irises normal, PERRL  RESP:  respiratory effort and palpation of chest normal, lungs clear to auscultation , no respiratory distress  CV:  regular rate and rhythm, no murmur, rub, or gallop, no edema  ABDOMEN:  normal bowel sounds, soft, nontender, no guarding or rebound  M/S:   Gait and station abnormal, sitting in bed.   SKIN:  Inspection of skin and subcutaneous tissue baseline, Palpation of skin and subcutaneous tissue baseline  NEURO:   Cranial nerves 2-12 are normal tested and grossly at patient's baseline, Examination of sensation by touch normal  PSYCH:  oriented X 3, memory impaired     Wt Readings from Last 4 Encounters:   05/20/25 66.6 kg (146 lb 12.8 oz)   05/11/25 68 kg (150 lb)   02/15/25 64 kg (141 lb 1.6 oz)   01/01/25 68.7 kg (151 lb 7.3 oz)       Lab/Diagnostic data:  Recent labs in [facility] reviewed by me today.    Most Recent 3 CBC's:  Recent Labs   Lab Test 05/15/25  0744 05/14/25  1002 05/13/25  0615   WBC 8.2 10.1 7.8   HGB 12.3* 11.1* 12.0*   MCV 96 98 95    160 182     Most Recent 3 BMP's:  Recent Labs   Lab Test 05/16/25  0930 05/15/25  0744 05/14/25  1002    140 139   POTASSIUM 4.2 4.5 4.2   CHLORIDE 106 109* 104   CO2 24 24 25 "   BUN 12.4 15.6 22.5   CR 0.87 0.94 1.29*   ANIONGAP 11 7 10   MARITZA 9.1 8.8 8.6*   * 96 100*     Most Recent 2 LFT's:  Recent Labs   Lab Test 05/11/25  1650 02/12/25  1623   AST 16 19   ALT 15 10   ALKPHOS 84 57   BILITOTAL 1.2 0.8         ASSESSMENT/PLAN:    (I95.1) Orthostatic hypotension  (primary encounter diagnosis)  (I10) Benign essential hypertension  Comment: Ongoing orthostatic hypotension with HTN, asymptomatic. PTA losartan, lasix, flomax dc'd.   Plan:   - Check orthostatic vital signs on 5/21/25 AM shift; update provider with results   - Check BMP on 5/27/25   - Continue metoprolol, rosuvastatin    (R53.81) Physical deconditioning  Comment: Ongoing physical deconditioning. PTA lives alone.   Plan:   - Continue PT, OT    (F41.9) Anxiety  (F02.A0) Mild dementia associated with other underlying disease, without behavioral disturbance, psychotic disturbance, mood disturbance, or anxiety (H)  Comment: Chronic anxiety with mild dementia with recent panic attacks, PTA ativan held due to high fall risk, weakness and orthostatic hypotension.   Plan:   - Continue lexapro  - Continue donepezil  - Therapy to complete cognitive testing  - Monitor changes in mood or behaviors  - SW following for discharge planning. Looking into retirement options.     (D64.9) Anemia, unspecified type  Comment: Mild anemia with hx melena s/p EGD on 2/13/25 negative for acute findings. Colonoscopy 2/14 w/ normal ileum, colon normal, internal hemorrhoids.   Plan:   - Recheck Hgb on 5/27/25  - Continue protonix  - Follow-up with Dr. Simons outpatient as directed    (N32.81) Overactive bladder  (N40.0) BPH without urinary obstruction  Comment: Chronic overactive bladder with BPH. PTA flomax dc'd due to orthostatic hypotension.   Plan:   - Continue detrol  - Follow-up with Urologist as directed    (E44.0) Moderate malnutrition  Comment: Chronic. Body mass index is 21.06 kg/m .  Plan:   - Monitor intake, weight  - Dietician  following      Orders:  - Check orthostatic vital signs on 5/21/25 AM shift; update provider with results   - Check BMP, Hgb on 5/27/25 dx malnutrition, anemia      Total time spent during today's visit was 46 mins including patient visit and review of past records.     Electronically signed by:  DEEPA Rothman CNP

## 2025-05-19 NOTE — PLAN OF CARE
Goal Outcome Evaluation:      Plan of Care Reviewed With: patient    Overall Patient Progress: improvingOverall Patient Progress: improving    A&Ox4. VSS on RA. Denies pain. PIV SL. Tolerating regular diet. Incontinent of urine, ex cath in place. Up A1 gt/w. Blanchable redness to sacrum, mepi in place. Bruising/ scab to bilateral shins. Plan for possible discharge to TCU today after reassess.

## 2025-05-19 NOTE — PROGRESS NOTES
PRIMARY Concern: Generalized weakness, positive orthostatics.   SAFETY RISK Concerns (fall risk, behaviors, etc.): Fall risk  Aggression Tool Color: Green.   Isolation/Type: None.   Tests/Procedures for NEXT shift:    Consults? (Pending/following, signed-off?) PT recommendations already in.   Where is patient from? (Home, TCU, etc.): Home  Other Important info for NEXT shift: Recent pneumonia, completed abx.  Anticipated DC date & active delays: Anticipated today between 2 and 3 pm.    _____________________________________________________________________________  SUMMARY NOTE:   Orientation/Cognitive: A&O x 4, pleasant  Observation Goals (Met/ Not Met): Inpatient  Mobility Level/Assist Equipment:  Ax1 GB/walker.  Antibiotics & Plan (IV/po, length of tx left): none  Pain Management: Denies.  Complete Pain Reassessment: yes. Due next: Next shift.  Tele/VS/O2: VSS on RA.    ABNL Lab/BG: see chart.   Diet: Regular, needs help ordering meals.  Bowel/Bladder: Incontinent of urine. External cath in place. Up to commode for Bms.    Skin Concerns: Blanchable redness to sacrum, mepilex in place CDI. Bruising and scabs to bilateral shins.   Drains/Devices: PIV R arm saline locked.

## 2025-05-19 NOTE — PLAN OF CARE
Goal Outcome Evaluation:       Patient has been discharged May 19, 2025 2:54 PM. Discharge instructions and education reviewed, medication schedule and follow up appts discussed. Pt had no questions. All belongings sent with pt.                        Opened in error

## 2025-05-20 ENCOUNTER — TRANSITIONAL CARE UNIT VISIT (OUTPATIENT)
Dept: GERIATRICS | Facility: CLINIC | Age: OVER 89
End: 2025-05-20
Payer: MEDICARE

## 2025-05-20 VITALS
OXYGEN SATURATION: 95 % | HEIGHT: 70 IN | RESPIRATION RATE: 18 BRPM | HEART RATE: 70 BPM | TEMPERATURE: 98 F | SYSTOLIC BLOOD PRESSURE: 155 MMHG | WEIGHT: 146.8 LBS | BODY MASS INDEX: 21.02 KG/M2 | DIASTOLIC BLOOD PRESSURE: 89 MMHG

## 2025-05-20 DIAGNOSIS — I95.1 ORTHOSTATIC HYPOTENSION: Primary | ICD-10-CM

## 2025-05-20 DIAGNOSIS — R53.81 PHYSICAL DECONDITIONING: ICD-10-CM

## 2025-05-20 DIAGNOSIS — F41.9 ANXIETY: ICD-10-CM

## 2025-05-20 DIAGNOSIS — I10 BENIGN ESSENTIAL HYPERTENSION: ICD-10-CM

## 2025-05-20 DIAGNOSIS — D64.9 ANEMIA, UNSPECIFIED TYPE: ICD-10-CM

## 2025-05-20 DIAGNOSIS — N40.0 BPH WITHOUT URINARY OBSTRUCTION: ICD-10-CM

## 2025-05-20 DIAGNOSIS — N32.81 OVERACTIVE BLADDER: ICD-10-CM

## 2025-05-20 DIAGNOSIS — E44.0 MODERATE MALNUTRITION: ICD-10-CM

## 2025-05-20 DIAGNOSIS — F02.A0 MILD DEMENTIA ASSOCIATED WITH OTHER UNDERLYING DISEASE, WITHOUT BEHAVIORAL DISTURBANCE, PSYCHOTIC DISTURBANCE, MOOD DISTURBANCE, OR ANXIETY (H): ICD-10-CM

## 2025-05-20 PROCEDURE — 86481 TB AG RESPONSE T-CELL SUSP: CPT | Performed by: NURSE PRACTITIONER

## 2025-05-20 PROCEDURE — 36415 COLL VENOUS BLD VENIPUNCTURE: CPT | Performed by: NURSE PRACTITIONER

## 2025-05-20 PROCEDURE — P9604 ONE-WAY ALLOW PRORATED TRIP: HCPCS | Performed by: NURSE PRACTITIONER

## 2025-05-20 PROCEDURE — 99310 SBSQ NF CARE HIGH MDM 45: CPT | Performed by: NURSE PRACTITIONER

## 2025-05-20 NOTE — PATIENT INSTRUCTIONS
[facility] Geriatrics   May 20, 2025     Name: Riki Alvarez   : 7/3/1934       Orders:  - Check orthostatic vital signs on 25 AM shift; update provider with results   - Check BMP, Hgb on 25 dx malnutrition, anemia      Electronically signed by   DEEPA Rothman CNP on 2025 at 3:17 PM

## 2025-05-20 NOTE — LETTER
5/20/2025      Riki Alvarez  [address] [address]        [facility] GERIATRICS    PRIMARY CARE PROVIDER AND CLINIC:  Karolyn Aiken MD, [address] [address]  Chief Complaint   Patient presents with     [facility] Medical Record Number:  [id number]  Place of Service where encounter took place:  [facility] (TCU) [[id number]]    Riki Alvarez  is a 89 year old  (7/3/1934), admitted to the above facility from  [facility]. Hospital stay 5/14/25 through 5/19/25..     HPI:      PMH: hypertension, hyperlipidemia, cognitive impairment, gastroesophageal reflux disease, benign prostatic hypertrophy, mood disorder     During recent admission to [facility] 5/11-5/13/25 with CAP, treated with course of azithromycin and cefpodoxime. Was evaluated by PT who recommended home with family assist and home PT/RN. Once he returned home he felt increasingly weak and was unable to ambulate without family assist. Ultimately he could not get out of bed on his own and he returned to the ED.     Admitted to [facility] 5/14-5/19/25 due to weakness with orthostatic hypotension. BP improved w/IV fluids and holding cardiac medications and anticholinergics. Ongoing orthostatic hypotension with improvement in readings; asymptomatic. PTA losartan and lasix continue to be on hold, restarted on metoprolol. PTA flomax dc'd and held PTA ativan.     Transferred to [facility] TCU on 5/19/25.      Today's concerns:    During exam, patient seen sitting in bed. Reports doing well. Has been participating in therapy. PTA was ambulating w/walker, now requires assist x 2 with transfers. Admits to variable intake w/meals. Endorses having loose stools today; denies abdominal pain, nausea. Denies chest pain, SOB, headache, syncope.      CODE STATUS/ADVANCE DIRECTIVES DISCUSSION:  No CPR- Do NOT Intubate   ALLERGIES: No Known Allergies   PAST MEDICAL  HISTORY:   Past Medical History:   Diagnosis Date     Anxiety      BPH (benign prostatic hyperplasia)      Hiatal hernia      Hyperlipidemia      Hypertension      Mild dementia (H)       PAST SURGICAL HISTORY:   has a past surgical history that includes Esophagoscopy, gastroscopy, duodenoscopy (EGD), combined (N/A, 2/13/2025) and Colonoscopy (N/A, 2/14/2025).  FAMILY HISTORY: family history includes Colon Cancer (age of onset: 60) in his brother.  SOCIAL HISTORY:   reports that he has quit smoking. His smoking use included cigarettes. He has never used smokeless tobacco. He reports that he does not currently use alcohol. He reports that he does not use drugs.  Patient's living condition: lives alone    Post Discharge Medication Reconciliation Status:   MED REC REQUIRED  Post Medication Reconciliation Status: discharge medications reconciled and changed, per note/orders       Current Outpatient Medications   Medication Sig Dispense Refill     acetaminophen (TYLENOL) 325 MG tablet Take 2 tablets (650 mg) by mouth every 4 hours as needed for mild pain or other (and adjunct with moderate or severe pain or per patient request).       donepezil (ARICEPT) 5 MG tablet Take 1 tablet (5 mg) by mouth every evening.       escitalopram (LEXAPRO) 10 MG tablet Take 10 mg by mouth at bedtime.       famciclovir (FAMVIR) 500 MG tablet Take 1 tablet (500 mg) by mouth 2 times daily.       fluorometholone (FLAREX) 0.1 % ophthalmic suspension Place 1 drop into the right eye every morning.       metoprolol succinate ER (TOPROL XL) 25 MG 24 hr tablet Take 1 tablet (25 mg) by mouth daily.       pantoprazole (PROTONIX) 40 MG EC tablet Take 1 tablet (40 mg) by mouth daily. 30 tablet 0     polyethylene glycol-propylene glycol (SYSTANE ULTRA) 0.4-0.3 % SOLN ophthalmic solution Place 1 drop Into the left eye every morning.       polyethylene glycol-propylene glycol (SYSTANE ULTRA) 0.4-0.3 % SOLN ophthalmic solution Place 1 drop Into the left  "eye daily as needed for dry eyes.       rosuvastatin (CRESTOR) 10 MG tablet Take 1 tablet (10 mg) by mouth daily.       tolterodine ER (DETROL LA) 2 MG 24 hr capsule Take 2 mg by mouth every evening.       No current facility-administered medications for this visit.       ROS:  10 point ROS of systems including Constitutional, Eyes, Respiratory, Cardiovascular, Gastroenterology, Genitourinary, Integumentary, Musculoskeletal, Psychiatric were all negative except for pertinent positives noted in my HPI.      Vitals:  BP (!) 155/89   Pulse 70   Temp 98  F (36.7  C)   Resp 18   Ht 1.778 m (5' 10\")   Wt 66.6 kg (146 lb 12.8 oz)   SpO2 95%   BMI 21.06 kg/m    Exam:  GENERAL APPEARANCE:  Alert, in no distress, oriented, thin, cooperative  ENT:  Mouth and posterior oropharynx normal, moist mucous membranes, [place]  EYES:  EOM, conjunctivae, lids, pupils and irises normal, PERRL  RESP:  respiratory effort and palpation of chest normal, lungs clear to auscultation , no respiratory distress  CV:  regular rate and rhythm, no murmur, rub, or gallop, no edema  ABDOMEN:  normal bowel sounds, soft, nontender, no guarding or rebound  M/S:   Gait and station abnormal, sitting in bed.   SKIN:  Inspection of skin and subcutaneous tissue baseline, Palpation of skin and subcutaneous tissue baseline  NEURO:   Cranial nerves 2-12 are normal tested and grossly at patient's baseline, Examination of sensation by touch normal  PSYCH:  oriented X 3, memory impaired     Wt Readings from Last 4 Encounters:   05/20/25 66.6 kg (146 lb 12.8 oz)   05/11/25 68 kg (150 lb)   02/15/25 64 kg (141 lb 1.6 oz)   01/01/25 68.7 kg (151 lb 7.3 oz)       Lab/Diagnostic data:  Recent labs in [facility] reviewed by me today.    Most Recent 3 CBC's:  Recent Labs   Lab Test 05/15/25  0744 05/14/25  1002 05/13/25  0615   WBC 8.2 10.1 7.8   HGB 12.3* 11.1* 12.0*   MCV 96 98 95    160 182     Most Recent 3 BMP's:  Recent Labs   Lab Test 05/16/25  0930 " 05/15/25  0744 05/14/25  1002    140 139   POTASSIUM 4.2 4.5 4.2   CHLORIDE 106 109* 104   CO2 24 24 25   BUN 12.4 15.6 22.5   CR 0.87 0.94 1.29*   ANIONGAP 11 7 10   MARITZA 9.1 8.8 8.6*   * 96 100*     Most Recent 2 LFT's:  Recent Labs   Lab Test 05/11/25  1650 02/12/25  1623   AST 16 19   ALT 15 10   ALKPHOS 84 57   BILITOTAL 1.2 0.8         ASSESSMENT/PLAN:    (I95.1) Orthostatic hypotension  (primary encounter diagnosis)  (I10) Benign essential hypertension  Comment: Ongoing orthostatic hypotension with HTN, asymptomatic. PTA losartan, lasix, flomax dc'd.   Plan:   - Check orthostatic vital signs on 5/21/25 AM shift; update provider with results   - Check BMP on 5/27/25   - Continue metoprolol, rosuvastatin    (R53.81) Physical deconditioning  Comment: Ongoing physical deconditioning. PTA lives alone.   Plan:   - Continue PT, OT    (F41.9) Anxiety  (F02.A0) Mild dementia associated with other underlying disease, without behavioral disturbance, psychotic disturbance, mood disturbance, or anxiety (H)  Comment: Chronic anxiety with mild dementia with recent panic attacks, PTA ativan held due to high fall risk, weakness and orthostatic hypotension.   Plan:   - Continue lexapro  - Continue donepezil  - Therapy to complete cognitive testing  - Monitor changes in mood or behaviors  - SW following for discharge planning. Looking into jail options.     (D64.9) Anemia, unspecified type  Comment: Mild anemia with hx melena s/p EGD on 2/13/25 negative for acute findings. Colonoscopy 2/14 w/ normal ileum, colon normal, internal hemorrhoids.   Plan:   - Recheck Hgb on 5/27/25  - Continue protonix  - Follow-up with Dr. Simons outpatient as directed    (N32.81) Overactive bladder  (N40.0) BPH without urinary obstruction  Comment: Chronic overactive bladder with BPH. PTA flomax dc'd due to orthostatic hypotension.   Plan:   - Continue detrol  - Follow-up with Urologist as directed    (E44.0) Moderate  malnutrition  Comment: Chronic. Body mass index is 21.06 kg/m .  Plan:   - Monitor intake, weight  - Dietician following      Orders:  - Check orthostatic vital signs on 5/21/25 AM shift; update provider with results   - Check BMP, Hgb on 5/27/25 dx malnutrition, anemia      Total time spent during today's visit was 46 mins including patient visit and review of past records.     Electronically signed by:  DEEPA Rothman CNP            Sincerely,        DEEPA Rothman CNP    Electronically signed

## 2025-05-21 ENCOUNTER — DOCUMENTATION ONLY (OUTPATIENT)
Dept: OTHER | Facility: CLINIC | Age: OVER 89
End: 2025-05-21
Payer: MEDICARE

## 2025-05-21 LAB
GAMMA INTERFERON BACKGROUND BLD IA-ACNC: 0.1 IU/ML
M TB IFN-G BLD-IMP: NEGATIVE
M TB IFN-G CD4+ BCKGRND COR BLD-ACNC: 9.9 IU/ML
MITOGEN IGNF BCKGRD COR BLD-ACNC: 0 IU/ML
MITOGEN IGNF BCKGRD COR BLD-ACNC: 0 IU/ML
QUANTIFERON MITOGEN: 10 IU/ML
QUANTIFERON NIL TUBE: 0.1 IU/ML
QUANTIFERON TB1 TUBE: 0.1 IU/ML
QUANTIFERON TB2 TUBE: 0.1

## 2025-05-23 ENCOUNTER — LAB REQUISITION (OUTPATIENT)
Dept: LAB | Facility: CLINIC | Age: OVER 89
End: 2025-05-23
Payer: MEDICARE

## 2025-05-23 DIAGNOSIS — E46 UNSPECIFIED PROTEIN-CALORIE MALNUTRITION: ICD-10-CM

## 2025-05-23 DIAGNOSIS — D64.9 ANEMIA, UNSPECIFIED: ICD-10-CM

## 2025-05-27 ENCOUNTER — TRANSITIONAL CARE UNIT VISIT (OUTPATIENT)
Dept: GERIATRICS | Facility: CLINIC | Age: OVER 89
End: 2025-05-27
Payer: MEDICARE

## 2025-05-27 VITALS
TEMPERATURE: 97.4 F | OXYGEN SATURATION: 95 % | DIASTOLIC BLOOD PRESSURE: 67 MMHG | SYSTOLIC BLOOD PRESSURE: 137 MMHG | BODY MASS INDEX: 22.02 KG/M2 | WEIGHT: 153.8 LBS | HEIGHT: 70 IN | RESPIRATION RATE: 18 BRPM | HEART RATE: 61 BPM

## 2025-05-27 DIAGNOSIS — I95.1 ORTHOSTATIC HYPOTENSION: Primary | ICD-10-CM

## 2025-05-27 DIAGNOSIS — E87.6 HYPOKALEMIA: ICD-10-CM

## 2025-05-27 DIAGNOSIS — D64.9 ANEMIA, UNSPECIFIED TYPE: ICD-10-CM

## 2025-05-27 DIAGNOSIS — F02.A0 MILD DEMENTIA ASSOCIATED WITH OTHER UNDERLYING DISEASE, WITHOUT BEHAVIORAL DISTURBANCE, PSYCHOTIC DISTURBANCE, MOOD DISTURBANCE, OR ANXIETY (H): ICD-10-CM

## 2025-05-27 DIAGNOSIS — R53.81 PHYSICAL DECONDITIONING: ICD-10-CM

## 2025-05-27 LAB
ANION GAP SERPL CALCULATED.3IONS-SCNC: 9 MMOL/L (ref 7–15)
BUN SERPL-MCNC: 21.2 MG/DL (ref 8–23)
CALCIUM SERPL-MCNC: 8.8 MG/DL (ref 8.8–10.4)
CHLORIDE SERPL-SCNC: 104 MMOL/L (ref 98–107)
CREAT SERPL-MCNC: 0.89 MG/DL (ref 0.67–1.17)
EGFRCR SERPLBLD CKD-EPI 2021: 81 ML/MIN/1.73M2
GLUCOSE SERPL-MCNC: 93 MG/DL (ref 70–99)
HCO3 SERPL-SCNC: 29 MMOL/L (ref 22–29)
HGB BLD-MCNC: 11 G/DL (ref 13.3–17.7)
MCV RBC AUTO: 97 FL (ref 78–100)
POTASSIUM SERPL-SCNC: 3.3 MMOL/L (ref 3.4–5.3)
SODIUM SERPL-SCNC: 142 MMOL/L (ref 135–145)

## 2025-05-27 PROCEDURE — 80048 BASIC METABOLIC PNL TOTAL CA: CPT | Performed by: NURSE PRACTITIONER

## 2025-05-27 PROCEDURE — 36415 COLL VENOUS BLD VENIPUNCTURE: CPT | Performed by: NURSE PRACTITIONER

## 2025-05-27 PROCEDURE — 85018 HEMOGLOBIN: CPT | Performed by: NURSE PRACTITIONER

## 2025-05-27 PROCEDURE — 99309 SBSQ NF CARE MODERATE MDM 30: CPT | Performed by: NURSE PRACTITIONER

## 2025-05-27 PROCEDURE — P9604 ONE-WAY ALLOW PRORATED TRIP: HCPCS | Performed by: NURSE PRACTITIONER

## 2025-05-27 NOTE — PROGRESS NOTES
"[facility] GERIATRICS    Chief Complaint   Patient presents with    RECHECK     HPI:  Riki Alvarez is a 89 year old  (7/3/1934), who is being seen today for an episodic care visit at: [facility] [[id number]].       Today's concern is:     Participating in therapy. Has difficulty standing, feels like he's going to fall due to weakness. Admits to variable appetite. Continues to have loose stools, but reports no bowel movements the last few days. Denies nausea or abdominal pain. Denies chest pain, SOB, headache, syncope.      Allergies, and PMH/PSH reviewed in [facility] today.    REVIEW OF SYSTEMS:  {fynbab68:292479}      Objective:   /67   Pulse 61   Temp 97.4  F (36.3  C)   Resp 18   Ht 1.778 m (5' 10\")   Wt 69.8 kg (153 lb 12.8 oz)   SpO2 95%   BMI 22.07 kg/m    {Nursing home physical exam :665165}    Recent labs in [facility] reviewed by me today.   Most Recent 3 CBC's:  Recent Labs   Lab Test 05/27/25  0531 05/15/25  0744 05/14/25  1002 05/13/25  0615   WBC  --  8.2 10.1 7.8   HGB 11.0* 12.3* 11.1* 12.0*   MCV 97 96 98 95   PLT  --  179 160 182     Most Recent 3 BMP's:  Recent Labs   Lab Test 05/27/25  0531 05/16/25  0930 05/15/25  0744    141 140   POTASSIUM 3.3* 4.2 4.5   CHLORIDE 104 106 109*   CO2 29 24 24   BUN 21.2 12.4 15.6   CR 0.89 0.87 0.94   ANIONGAP 9 11 7   MARITZA 8.8 9.1 8.8   GLC 93 153* 96         Assessment/Plan:    {FGS DX2:962145}          Orders:  - Needs assistance w/meal set-up and cutting up food   - Apply compression stockings on in the AM and off at night dx orthostatic hypotension; therapy to assist if patient doesn't have compression stockings  - Add potassium chloride 10meq every day   - ACP to follow  - Check orthostatic vital signs once/week x 2 weeks; update provider with results in 2 weeks dx orthostatic hypotension  - Check BMP, Hgb on 6/9/25 dx orthostatic hypotension, anemia       Electronically signed by: Ellie Zuniga       " 05/13/25  0615   WBC  --  8.2 10.1 7.8   HGB 11.0* 12.3* 11.1* 12.0*   MCV 97 96 98 95   PLT  --  179 160 182     Most Recent 3 BMP's:  Recent Labs   Lab Test 05/27/25  0531 05/16/25  0930 05/15/25  0744    141 140   POTASSIUM 3.3* 4.2 4.5   CHLORIDE 104 106 109*   CO2 29 24 24   BUN 21.2 12.4 15.6   CR 0.89 0.87 0.94   ANIONGAP 9 11 7   MARITZA 8.8 9.1 8.8   GLC 93 153* 96         Assessment/Plan:    (I95.1) Orthostatic hypotension  (primary encounter diagnosis)  Comment: Ongoing orthostatic hypotension with HTN with weakness w/activity. PTA losartan, lasix, flomax dc'd.   Plan:   - Check orthostatic vital signs once/week x 2 weeks; update provider with results in 2 weeks dx orthostatic hypotension  - Check BMP on 6/9  - Apply compression stockings on in the AM and off at night dx orthostatic     (R53.81) Physical deconditioning  (F02.A0) Mild dementia associated with other underlying disease, without behavioral disturbance, psychotic disturbance, mood disturbance, or anxiety (H)  Comment: Ongoing physical deconditioning secondary to orthostatic hypotension and recent hospitalization. PTA lives alone. SLUMS 14/30  Last therapy update:    Transfer Min A  Bed Mob SBA  Ambulation 90' with 2 WW CGA  UB dressing Set up  LB dressing Min A  Toileting Min A  Plan:   - Continue lexapro, donepezil  - ACP to follow  - Therapy to complete CPT  - Monitor changes in mood or behaviors  - SW following for discharge planning. Looking into ROSA options.   UPDATE: Reviewed patient status and treatment plan with Anna (niece).     (E87.6) Hypokalemia  Comment: Mild  Plan:   - Add potassium chloride 10meq every day  - Recheck BMP on 6/9/25    (D64.9) Anemia, unspecified type  Comment: Mild anemia  Plan:   - Recheck Hgb on 6/9/25; consider iron studies if ongoing anemia      Orders:  - Needs assistance w/meal set-up and cutting up food   - Apply compression stockings on in the AM and off at night dx orthostatic hypotension; therapy  to assist if patient doesn't have compression stockings  - Add potassium chloride 10meq every day   - ACP to follow  - Check orthostatic vital signs once/week x 2 weeks; update provider with results in 2 weeks dx orthostatic hypotension  - Check BMP, Hgb on 6/9/25 dx orthostatic hypotension, anemia       Electronically signed by: DEEPA Rothman CNP

## 2025-05-27 NOTE — PATIENT INSTRUCTIONS
[facility] Geriatrics   May 27, 2025     Name: Riki Alvarez   : 7/3/1934       Orders:  - Needs assistance w/meal set-up and cutting up food   - Apply compression stockings on in the AM and off at night dx orthostatic hypotension; therapy to assist if patient doesn't have compression stockings  - Add potassium chloride 10meq every day   - ACP to follow  - Check orthostatic vital signs once/week x 2 weeks; update provider with results in 2 weeks dx orthostatic hypotension  - Check BMP, Hgb on 25 dx orthostatic hypotension, anemia         Electronically signed by  DEEPA Rothman CNP on 2025 at 1:00 PM

## 2025-06-03 ENCOUNTER — TRANSITIONAL CARE UNIT VISIT (OUTPATIENT)
Dept: GERIATRICS | Facility: CLINIC | Age: OVER 89
End: 2025-06-03
Payer: MEDICARE

## 2025-06-03 VITALS
OXYGEN SATURATION: 94 % | DIASTOLIC BLOOD PRESSURE: 71 MMHG | HEIGHT: 70 IN | SYSTOLIC BLOOD PRESSURE: 155 MMHG | TEMPERATURE: 97.3 F | HEART RATE: 69 BPM | WEIGHT: 159 LBS | BODY MASS INDEX: 22.76 KG/M2 | RESPIRATION RATE: 16 BRPM

## 2025-06-03 DIAGNOSIS — R53.81 PHYSICAL DECONDITIONING: ICD-10-CM

## 2025-06-03 DIAGNOSIS — F41.9 ANXIETY: ICD-10-CM

## 2025-06-03 DIAGNOSIS — I95.1 ORTHOSTATIC HYPOTENSION: Primary | ICD-10-CM

## 2025-06-03 DIAGNOSIS — I10 BENIGN ESSENTIAL HYPERTENSION: ICD-10-CM

## 2025-06-03 PROCEDURE — 99305 1ST NF CARE MODERATE MDM 35: CPT | Performed by: INTERNAL MEDICINE

## 2025-06-03 NOTE — PROGRESS NOTES
[facility] GERIATRICS    PRIMARY CARE PROVIDER AND CLINIC:  Karolyn Aiken MD, [address] [facility] [facility] [address]  Chief Complaint   Patient presents with    Hospital F/U      [place] Medical Record Number:  [id number]  Place of Service where encounter took place:  [facility] ([facility])     Riki Alvarez  is a 89 year old  (7/3/1934), admitted to the above facility from  [facility]. Hospital stay 5/14/25 through 5/19/25..     Hospital course was reviewed by me, is as per the hospital discharge summary and nurse practitioner note.    Patient has a past medical history of hypertension, cognitive impairment, anemia with history of melena February 2015 with unremarkable EGD and colonoscopy, GERD, BPH, recent hospitalization for pneumonia 5/11/2025 through 5/13/2025.    He was readmitted to the hospital from home secondary to increased weakness with eventual inability to get out of bed.  He was noted to have LADY and orthostatic hypotension in the ER, treated with intravenous fluids and holding of his cardiac medications, including losartan and furosemide.  PTA metoprolol was continued.  PTA tamsulosin was discontinued secondary to continued orthostatic hypotension though patient's symptoms did improve during hospitalization.  Renal function improved with IV fluids and holding of losartan and furosemide.  He exhibited minimal respiratory symptoms during hospitalization following previous hospitalization and has completed course of antibiotics.  Of note, the patient's wife passed away in October 2024.  Patient has exhibited increased anxiety, depression with occasional panic attacks.  He had been started on low-dose lorazepam on 5/9/2025 which was discontinued during recent hospitalization secondary to fall risk and generalized weakness.    Patient states he is feeling stronger overall.  Appetite has been fair.  He notes minimal nonproductive cough.  He  has occasional dizziness with standing but has been able to participate in therapy.  He notes ongoing sadness regarding his wife's passing, states he probably will need to move out of his home.      CODE STATUS/ADVANCE DIRECTIVES DISCUSSION:  No CPR- Do NOT Intubate  DNR / DNI  ALLERGIES: No Known Allergies   PAST MEDICAL HISTORY:   Past Medical History:   Diagnosis Date    Anxiety     BPH (benign prostatic hyperplasia)     Hiatal hernia     Hyperlipidemia     Hypertension     Mild dementia (H)       PAST SURGICAL HISTORY:   has a past surgical history that includes Esophagoscopy, gastroscopy, duodenoscopy (EGD), combined (N/A, 2/13/2025) and Colonoscopy (N/A, 2/14/2025).  FAMILY HISTORY: family history includes Colon Cancer (age of onset: 60) in his brother.  SOCIAL HISTORY:   reports that he has quit smoking. His smoking use included cigarettes. He has never used smokeless tobacco. He reports that he does not currently use alcohol. He reports that he does not use drugs.  Patient's living condition: lives alone    Current medications were reviewed by me:        Current Outpatient Medications   Medication Sig Dispense Refill    acetaminophen (TYLENOL) 325 MG tablet Take 2 tablets (650 mg) by mouth every 4 hours as needed for mild pain or other (and adjunct with moderate or severe pain or per patient request).      donepezil (ARICEPT) 5 MG tablet Take 1 tablet (5 mg) by mouth every evening.      escitalopram (LEXAPRO) 10 MG tablet Take 10 mg by mouth at bedtime.      famciclovir (FAMVIR) 500 MG tablet Take 1 tablet (500 mg) by mouth 2 times daily.      fluorometholone (FLAREX) 0.1 % ophthalmic suspension Place 1 drop into the right eye every morning.      metoprolol succinate ER (TOPROL XL) 25 MG 24 hr tablet Take 1 tablet (25 mg) by mouth daily.      pantoprazole (PROTONIX) 40 MG EC tablet Take 1 tablet (40 mg) by mouth daily. 30 tablet 0    polyethylene glycol-propylene glycol (SYSTANE ULTRA) 0.4-0.3 % SOLN  "ophthalmic solution Place 1 drop Into the left eye every morning.      polyethylene glycol-propylene glycol (SYSTANE ULTRA) 0.4-0.3 % SOLN ophthalmic solution Place 1 drop Into the left eye daily as needed for dry eyes.      rosuvastatin (CRESTOR) 10 MG tablet Take 1 tablet (10 mg) by mouth daily.      tolterodine ER (DETROL LA) 2 MG 24 hr capsule Take 2 mg by mouth every evening.       No current facility-administered medications for this visit.       ROS:  10 point ROS of systems including Constitutional, Eyes, Respiratory, Cardiovascular, Gastroenterology, Genitourinary, Integumentary, Musculoskeletal, Psychiatric were all negative except for pertinent positives noted in my HPI.    Vitals:  BP (!) 155/71   Pulse 69   Temp 97.3  F (36.3  C)   Resp 16   Ht 1.778 m (5' 10\")   Wt 72.1 kg (159 lb)   SpO2 94%   BMI 22.81 kg/m    Exam:  Thin appearing male, sitting in room, in NAD  Pleasant, sad appearing  HEENT oral mucosa moist  Lungs clear  CV rrr with occ extra systolic beats  Abd soft  Extremities without edema  NEURO pleasant but sad appearing.  Oriented to person, place, and general circumstances.  Sl tremors both hands  No focal weakness  Gait was not assessed    Lab/Diagnostic data:  Most Recent 3 CBC's:  Recent Labs   Lab Test 05/27/25  0531 05/15/25  0744 05/14/25  1002 05/13/25  0615   WBC  --  8.2 10.1 7.8   HGB 11.0* 12.3* 11.1* 12.0*   MCV 97 96 98 95   PLT  --  179 160 182     Most Recent 3 BMP's:  Recent Labs   Lab Test 05/27/25  0531 05/16/25  0930 05/15/25  0744    141 140   POTASSIUM 3.3* 4.2 4.5   CHLORIDE 104 106 109*   CO2 29 24 24   BUN 21.2 12.4 15.6   CR 0.89 0.87 0.94   ANIONGAP 9 11 7   MARITZA 8.8 9.1 8.8   GLC 93 153* 96     Most Recent 2 LFT's:  Recent Labs   Lab Test 05/11/25  1650 02/12/25  1623   AST 16 19   ALT 15 10   ALKPHOS 84 57   BILITOTAL 1.2 0.8     Most Recent TSH and T4:  Recent Labs   Lab Test 12/31/24  2214   TSH 1.28     Most Recent Anemia Panel:  Recent Labs   Lab " Test 05/27/25  0531 05/15/25  0744   WBC  --  8.2   HGB 11.0* 12.3*   HCT  --  36.4*   MCV 97 96   PLT  --  179       ASSESSMENT/PLAN:    Weakness, symptomatic orthostatic hypotension following recent hospitalization for community-acquired pneumonia  Respiratory status stable  Orthostatic symptoms improving with discontinuation of losartan, furosemide, tamsulosin  Plan: Encourage adequate oral intake.  Monitor volume status.  Compression stockings have been ordered  Therapies  Monitor nutritional status    BPH  Plan: Continue tolterodine, monitor bladder symptoms    History of hypertension  Stable on metoprolol XL  Plan: Monitor vital signs, avoid hypotension as possible.  Will tolerate mild hypertension if necessary    Cognitive impairment  Depression, anxiety, exacerbated by wife's recent death  Plan: Continue Lexapro, donepezil  If patient seems to have anxiety, consider addition of  mirtazapine  Therapies.  Assure safe discharge      Tonio Charles MD

## 2025-06-03 NOTE — LETTER
6/3/2025      Riki Alvarez  [address] [facility] [address]        [facility] GERIATRICS    PRIMARY CARE PROVIDER AND CLINIC:  Karolyn Aiken MD, [address] [facility] [address]  Chief Complaint   Patient presents with     Hospital F/U      [place] Medical Record Number:  [id number]  Place of Service where encounter took place:  [facility]     Riki Alvarez  is a 89 year old  (7/3/1934), admitted to the above facility from  [facility]. Hospital stay 5/14/25 through 5/19/25..     Hospital course was reviewed by me, is as per the hospital discharge summary and nurse practitioner note.    Patient has a past medical history of hypertension, cognitive impairment, anemia with history of melena February 2015 with unremarkable EGD and colonoscopy, GERD, BPH, recent hospitalization for pneumonia 5/11/2025 through 5/13/2025.    He was readmitted to the hospital from home secondary to increased weakness with eventual inability to get out of bed.  He was noted to have LADY and orthostatic hypotension in the ER, treated with intravenous fluids and holding of his cardiac medications, including losartan and furosemide.  PTA metoprolol was continued.  PTA tamsulosin was discontinued secondary to continued orthostatic hypotension though patient's symptoms did improve during hospitalization.  Renal function improved with IV fluids and holding of losartan and furosemide.  He exhibited minimal respiratory symptoms during hospitalization following previous hospitalization and has completed course of antibiotics.  Of note, the patient's wife passed away in October 2024.  Patient has exhibited increased anxiety, depression with occasional panic attacks.  He had been started on low-dose lorazepam on 5/9/2025 which was discontinued during recent hospitalization secondary to fall risk and generalized weakness.    Patient states he is feeling  stronger overall.  Appetite has been fair.  He notes minimal nonproductive cough.  He has occasional dizziness with standing but has been able to participate in therapy.  He notes ongoing sadness regarding his wife's passing, states he probably will need to move out of his home.      CODE STATUS/ADVANCE DIRECTIVES DISCUSSION:  No CPR- Do NOT Intubate  DNR / DNI  ALLERGIES: No Known Allergies   PAST MEDICAL HISTORY:   Past Medical History:   Diagnosis Date     Anxiety      BPH (benign prostatic hyperplasia)      Hiatal hernia      Hyperlipidemia      Hypertension      Mild dementia (H)       PAST SURGICAL HISTORY:   has a past surgical history that includes Esophagoscopy, gastroscopy, duodenoscopy (EGD), combined (N/A, 2/13/2025) and Colonoscopy (N/A, 2/14/2025).  FAMILY HISTORY: family history includes Colon Cancer (age of onset: 60) in his brother.  SOCIAL HISTORY:   reports that he has quit smoking. His smoking use included cigarettes. He has never used smokeless tobacco. He reports that he does not currently use alcohol. He reports that he does not use drugs.  Patient's living condition: lives alone    Current medications were reviewed by me:        Current Outpatient Medications   Medication Sig Dispense Refill     acetaminophen (TYLENOL) 325 MG tablet Take 2 tablets (650 mg) by mouth every 4 hours as needed for mild pain or other (and adjunct with moderate or severe pain or per patient request).       donepezil (ARICEPT) 5 MG tablet Take 1 tablet (5 mg) by mouth every evening.       escitalopram (LEXAPRO) 10 MG tablet Take 10 mg by mouth at bedtime.       famciclovir (FAMVIR) 500 MG tablet Take 1 tablet (500 mg) by mouth 2 times daily.       fluorometholone (FLAREX) 0.1 % ophthalmic suspension Place 1 drop into the right eye every morning.       metoprolol succinate ER (TOPROL XL) 25 MG 24 hr tablet Take 1 tablet (25 mg) by mouth daily.       pantoprazole (PROTONIX) 40 MG EC tablet Take 1 tablet (40 mg) by  "mouth daily. 30 tablet 0     polyethylene glycol-propylene glycol (SYSTANE ULTRA) 0.4-0.3 % SOLN ophthalmic solution Place 1 drop Into the left eye every morning.       polyethylene glycol-propylene glycol (SYSTANE ULTRA) 0.4-0.3 % SOLN ophthalmic solution Place 1 drop Into the left eye daily as needed for dry eyes.       rosuvastatin (CRESTOR) 10 MG tablet Take 1 tablet (10 mg) by mouth daily.       tolterodine ER (DETROL LA) 2 MG 24 hr capsule Take 2 mg by mouth every evening.       No current facility-administered medications for this visit.       ROS:  10 point ROS of systems including Constitutional, Eyes, Respiratory, Cardiovascular, Gastroenterology, Genitourinary, Integumentary, Musculoskeletal, Psychiatric were all negative except for pertinent positives noted in my HPI.    Vitals:  BP (!) 155/71   Pulse 69   Temp 97.3  F (36.3  C)   Resp 16   Ht 1.778 m (5' 10\")   Wt 72.1 kg (159 lb)   SpO2 94%   BMI 22.81 kg/m    Exam:  Thin appearing male, sitting in room, in NAD  Pleasant, sad appearing  HEENT oral mucosa moist  Lungs clear  CV rrr with occ extra systolic beats  Abd soft  Extremities without edema  NEURO pleasant but sad appearing.  Oriented to person, place, and general circumstances.  Sl tremors both hands  No focal weakness  Gait was not assessed    Lab/Diagnostic data:  Most Recent 3 CBC's:  Recent Labs   Lab Test 05/27/25  0531 05/15/25  0744 05/14/25  1002 05/13/25  0615   WBC  --  8.2 10.1 7.8   HGB 11.0* 12.3* 11.1* 12.0*   MCV 97 96 98 95   PLT  --  179 160 182     Most Recent 3 BMP's:  Recent Labs   Lab Test 05/27/25  0531 05/16/25  0930 05/15/25  0744    141 140   POTASSIUM 3.3* 4.2 4.5   CHLORIDE 104 106 109*   CO2 29 24 24   BUN 21.2 12.4 15.6   CR 0.89 0.87 0.94   ANIONGAP 9 11 7   MARITZA 8.8 9.1 8.8   GLC 93 153* 96     Most Recent 2 LFT's:  Recent Labs   Lab Test 05/11/25  1650 02/12/25  1623   AST 16 19   ALT 15 10   ALKPHOS 84 57   BILITOTAL 1.2 0.8     Most Recent TSH and " T4:  Recent Labs   Lab Test 12/31/24  2214   TSH 1.28     Most Recent Anemia Panel:  Recent Labs   Lab Test 05/27/25  0531 05/15/25  0744   WBC  --  8.2   HGB 11.0* 12.3*   HCT  --  36.4*   MCV 97 96   PLT  --  179       ASSESSMENT/PLAN:    Weakness, symptomatic orthostatic hypotension following recent hospitalization for community-acquired pneumonia  Respiratory status stable  Orthostatic symptoms improving with discontinuation of losartan, furosemide, tamsulosin  Plan: Encourage adequate oral intake.  Monitor volume status.  Compression stockings have been ordered  Therapies  Monitor nutritional status    BPH  Plan: Continue tolterodine, monitor bladder symptoms    History of hypertension  Stable on metoprolol XL  Plan: Monitor vital signs, avoid hypotension as possible.  Will tolerate mild hypertension if necessary    Cognitive impairment  Depression, anxiety, exacerbated by wife's recent death  Plan: Continue Lexapro, donepezil  If patient seems to have anxiety, consider addition of  mirtazapine  Therapies.  Assure safe discharge      Tonio Charles MD      Sincerely,        Tonio Charles MD    Electronically signed

## 2025-06-07 ENCOUNTER — LAB REQUISITION (OUTPATIENT)
Dept: LAB | Facility: CLINIC | Age: OVER 89
End: 2025-06-07
Payer: MEDICARE

## 2025-06-07 DIAGNOSIS — D64.9 ANEMIA, UNSPECIFIED: ICD-10-CM

## 2025-06-07 DIAGNOSIS — I95.1 ORTHOSTATIC HYPOTENSION: ICD-10-CM

## 2025-06-09 VITALS
OXYGEN SATURATION: 96 % | TEMPERATURE: 98 F | BODY MASS INDEX: 22.76 KG/M2 | HEIGHT: 70 IN | RESPIRATION RATE: 18 BRPM | DIASTOLIC BLOOD PRESSURE: 81 MMHG | WEIGHT: 159 LBS | HEART RATE: 73 BPM | SYSTOLIC BLOOD PRESSURE: 168 MMHG

## 2025-06-09 LAB
ANION GAP SERPL CALCULATED.3IONS-SCNC: 8 MMOL/L (ref 7–15)
BUN SERPL-MCNC: 13.9 MG/DL (ref 8–23)
CALCIUM SERPL-MCNC: 8.7 MG/DL (ref 8.8–10.4)
CHLORIDE SERPL-SCNC: 103 MMOL/L (ref 98–107)
CREAT SERPL-MCNC: 0.79 MG/DL (ref 0.67–1.17)
EGFRCR SERPLBLD CKD-EPI 2021: 84 ML/MIN/1.73M2
GLUCOSE SERPL-MCNC: 88 MG/DL (ref 70–99)
HCO3 SERPL-SCNC: 30 MMOL/L (ref 22–29)
HGB BLD-MCNC: 10.6 G/DL (ref 13.3–17.7)
MCV RBC AUTO: 100 FL (ref 78–100)
POTASSIUM SERPL-SCNC: 4.3 MMOL/L (ref 3.4–5.3)
SODIUM SERPL-SCNC: 141 MMOL/L (ref 135–145)

## 2025-06-09 PROCEDURE — P9604 ONE-WAY ALLOW PRORATED TRIP: HCPCS | Performed by: NURSE PRACTITIONER

## 2025-06-09 PROCEDURE — 36415 COLL VENOUS BLD VENIPUNCTURE: CPT | Performed by: NURSE PRACTITIONER

## 2025-06-09 PROCEDURE — 82310 ASSAY OF CALCIUM: CPT | Performed by: NURSE PRACTITIONER

## 2025-06-09 PROCEDURE — 80048 BASIC METABOLIC PNL TOTAL CA: CPT | Performed by: NURSE PRACTITIONER

## 2025-06-09 PROCEDURE — 85018 HEMOGLOBIN: CPT | Performed by: NURSE PRACTITIONER

## 2025-06-09 NOTE — PROGRESS NOTES
"[facility] GERIATRICS    Chief Complaint   Patient presents with    RECHECK     HPI:  Riki Alvarez is a 89 year old  (7/3/1934), who is being seen today for an episodic care visit at: [facility] ([facility]) [[id number]].       Today's concern is:           Allergies, and PMH/PSH reviewed in EPIC today.    REVIEW OF SYSTEMS:  4 point ROS including Respiratory, CV, GI and , other than that noted in the HPI,  is negative      Objective:   BP (!) 168/81   Pulse 73   Temp 98  F (36.7  C)   Resp 18   Ht 1.778 m (5' 10\")   Wt 72.1 kg (159 lb)   SpO2 96%   BMI 22.81 kg/m    {Nursing home physical exam :362320}    Wt Readings from Last 4 Encounters:   06/09/25 72.1 kg (159 lb)   06/03/25 72.1 kg (159 lb)   05/27/25 69.8 kg (153 lb 12.8 oz)   05/20/25 66.6 kg (146 lb 12.8 oz)       Recent labs in [facility] reviewed by me today.    Most Recent 3 CBC's:  Recent Labs   Lab Test 06/09/25  0608 05/27/25  0531 05/15/25  0744 05/14/25  1002 05/13/25  0615   WBC  --   --  8.2 10.1 7.8   HGB 10.6* 11.0* 12.3* 11.1* 12.0*    97 96 98 95   PLT  --   --  179 160 182     Most Recent 3 BMP's:  Recent Labs   Lab Test 06/09/25  0608 05/27/25  0531 05/16/25  0930    142 141   POTASSIUM 4.3 3.3* 4.2   CHLORIDE 103 104 106   CO2 30* 29 24   BUN 13.9 21.2 12.4   CR 0.79 0.89 0.87   ANIONGAP 8 9 11   MARITZA 8.7* 8.8 9.1   GLC 88 93 153*         Assessment/Plan:    {FGS DX2:341473}      Last therapy update:  Transfer CGA  Ambulation 300' with 2 WW CGA  SLUMS 14/30  CPT 4.4/5.5  UB dressing set-up  LB dressing Mod A - Min A  Toileting Mod A- Min A      Orders:  - Therapy to check orthostatic vital signs; update provider with results   - Check Hgb, iron level, iron saturation, ferritin, vitamin B12, folic acid level on 6/12/25 dx anemia      Electronically signed by: DEEPA Rothman CNP       "     Most Recent 3 BMP's:  Recent Labs   Lab Test 06/09/25  0608 05/27/25  0531 05/16/25  0930    142 141   POTASSIUM 4.3 3.3* 4.2   CHLORIDE 103 104 106   CO2 30* 29 24   BUN 13.9 21.2 12.4   CR 0.79 0.89 0.87   ANIONGAP 8 9 11   MARITZA 8.7* 8.8 9.1   GLC 88 93 153*         Assessment/Plan:    (I95.1) Orthostatic hypotension  (primary encounter diagnosis)  Comment: Ongoing orthostatic hypotension with HTN with weakness w/activity. PTA losartan, lasix, flomax dc'd.   Plan:   - Therapy to check orthostatic vital signs; update provider with results   - Continue compression stockings on in the AM and off at night     (R53.81) Physical deconditioning  (F02.A0) Mild dementia associated with other underlying disease, without behavioral disturbance, psychotic disturbance, mood disturbance, or anxiety (H)  Comment: Ongoing physical deconditioning secondary to orthostatic hypotension and recent hospitalization. PTA lives alone. [facility] 14/30  Last therapy update:  Transfer CGA  Ambulation 300' with 2 WW CGA  [facility] 14/30  CPT 4.4/5.5  UB dressing set-up  LB dressing Mod A - Min A  Toileting Mod A- Min A  Plan:   - Continue lexapro, donepezil  - ACP to follow  - Therapy to complete CPT  - Monitor changes in mood or behaviors  - SW following for discharge planning. Looking into custodial options.     (D64.9) Anemia, unspecified type  Comment: Mild anemia  Plan:   - Check Hgb, iron level, iron saturation, ferritin, vitamin B12, folic acid level on 6/12/25 dx anemia      Orders:  - Therapy to check orthostatic vital signs; update provider with results   - Check Hgb, iron level, iron saturation, ferritin, vitamin B12, folic acid level on 6/12/25 dx anemia      Electronically signed by: DEEPA Rothman CNP

## 2025-06-10 ENCOUNTER — TRANSITIONAL CARE UNIT VISIT (OUTPATIENT)
Dept: GERIATRICS | Facility: CLINIC | Age: OVER 89
End: 2025-06-10
Payer: MEDICARE

## 2025-06-10 DIAGNOSIS — D64.9 ANEMIA, UNSPECIFIED TYPE: ICD-10-CM

## 2025-06-10 DIAGNOSIS — I95.1 ORTHOSTATIC HYPOTENSION: Primary | ICD-10-CM

## 2025-06-10 DIAGNOSIS — F02.A0 MILD DEMENTIA ASSOCIATED WITH OTHER UNDERLYING DISEASE, WITHOUT BEHAVIORAL DISTURBANCE, PSYCHOTIC DISTURBANCE, MOOD DISTURBANCE, OR ANXIETY (H): ICD-10-CM

## 2025-06-10 DIAGNOSIS — R53.81 PHYSICAL DECONDITIONING: ICD-10-CM

## 2025-06-10 PROCEDURE — 99309 SBSQ NF CARE MODERATE MDM 30: CPT | Performed by: NURSE PRACTITIONER

## 2025-06-10 RX ORDER — POTASSIUM CHLORIDE 750 MG/1
10 TABLET, EXTENDED RELEASE ORAL DAILY
COMMUNITY
Start: 2025-06-10

## 2025-06-10 NOTE — PATIENT INSTRUCTIONS
[facility] Geriatrics   [place]ailin 10, 2025     Name: Riki Alvarez   : 7/3/1934       Orders:  - Therapy to check orthostatic vital signs; update provider with results   - Check Hgb, iron level, iron saturation, ferritin, vitamin B12, folic acid level on 25 dx anemia      Electronically signed by   DEEPA Rothman CNP on 6/10/2025 at 2:28 PM

## 2025-06-11 ENCOUNTER — LAB REQUISITION (OUTPATIENT)
Dept: LAB | Facility: CLINIC | Age: OVER 89
End: 2025-06-11
Payer: MEDICARE

## 2025-06-11 VITALS
SYSTOLIC BLOOD PRESSURE: 158 MMHG | HEIGHT: 70 IN | OXYGEN SATURATION: 94 % | WEIGHT: 159 LBS | DIASTOLIC BLOOD PRESSURE: 63 MMHG | RESPIRATION RATE: 16 BRPM | TEMPERATURE: 97.9 F | HEART RATE: 70 BPM | BODY MASS INDEX: 22.76 KG/M2

## 2025-06-11 DIAGNOSIS — D64.9 ANEMIA, UNSPECIFIED: ICD-10-CM

## 2025-06-12 ENCOUNTER — TRANSITIONAL CARE UNIT VISIT (OUTPATIENT)
Dept: GERIATRICS | Facility: CLINIC | Age: OVER 89
End: 2025-06-12
Payer: MEDICARE

## 2025-06-12 DIAGNOSIS — I95.1 ORTHOSTATIC HYPOTENSION: Primary | ICD-10-CM

## 2025-06-12 DIAGNOSIS — M25.552 HIP PAIN, LEFT: ICD-10-CM

## 2025-06-12 DIAGNOSIS — F02.A0 MILD DEMENTIA ASSOCIATED WITH OTHER UNDERLYING DISEASE, WITHOUT BEHAVIORAL DISTURBANCE, PSYCHOTIC DISTURBANCE, MOOD DISTURBANCE, OR ANXIETY (H): ICD-10-CM

## 2025-06-12 DIAGNOSIS — R53.81 PHYSICAL DECONDITIONING: ICD-10-CM

## 2025-06-12 DIAGNOSIS — D50.9 IRON DEFICIENCY ANEMIA, UNSPECIFIED IRON DEFICIENCY ANEMIA TYPE: ICD-10-CM

## 2025-06-12 DIAGNOSIS — D50.9 IRON DEFICIENCY ANEMIA, UNSPECIFIED IRON DEFICIENCY ANEMIA TYPE: Primary | ICD-10-CM

## 2025-06-12 LAB
FERRITIN SERPL-MCNC: 229 NG/ML (ref 31–409)
FOLATE SERPL-MCNC: 14.1 NG/ML (ref 4.6–34.8)
HGB BLD-MCNC: 10.4 G/DL (ref 13.3–17.7)
IRON BINDING CAPACITY (ROCHE): 201 UG/DL (ref 240–430)
IRON SATN MFR SERPL: 27 % (ref 15–46)
IRON SERPL-MCNC: 54 UG/DL (ref 61–157)
MCV RBC AUTO: 99 FL (ref 78–100)
VIT B12 SERPL-MCNC: 423 PG/ML (ref 232–1245)

## 2025-06-12 PROCEDURE — 82746 ASSAY OF FOLIC ACID SERUM: CPT | Performed by: NURSE PRACTITIONER

## 2025-06-12 PROCEDURE — 99310 SBSQ NF CARE HIGH MDM 45: CPT | Performed by: NURSE PRACTITIONER

## 2025-06-12 PROCEDURE — 82728 ASSAY OF FERRITIN: CPT | Performed by: NURSE PRACTITIONER

## 2025-06-12 PROCEDURE — 85018 HEMOGLOBIN: CPT | Performed by: NURSE PRACTITIONER

## 2025-06-12 PROCEDURE — 82607 VITAMIN B-12: CPT | Performed by: NURSE PRACTITIONER

## 2025-06-12 PROCEDURE — P9604 ONE-WAY ALLOW PRORATED TRIP: HCPCS | Performed by: NURSE PRACTITIONER

## 2025-06-12 PROCEDURE — 36415 COLL VENOUS BLD VENIPUNCTURE: CPT | Performed by: NURSE PRACTITIONER

## 2025-06-12 PROCEDURE — 83550 IRON BINDING TEST: CPT | Performed by: NURSE PRACTITIONER

## 2025-06-12 RX ORDER — FERROUS SULFATE 325(65) MG
325 TABLET ORAL
Qty: 20 TABLET | Refills: 0 | Status: SHIPPED | OUTPATIENT
Start: 2025-06-13

## 2025-06-12 NOTE — PATIENT INSTRUCTIONS
[facility] Geriatrics   2025     Name: Riki Alvarez   : 7/3/1934       Orders:  - Add ferrous sulfate 325mg, take 1 tab MWF dx iron deficiency anemia  - Recheck BMP, Hgb in 2 weeks dx anemia, orthostatic hypotension       Electronically signed by  DEEPA Rothman CNP on 2025 at 5:01 PM

## 2025-06-18 RX ORDER — ACETAMINOPHEN 325 MG/1
650 TABLET ORAL EVERY 4 HOURS PRN
COMMUNITY
Start: 2025-06-18

## 2025-06-19 ENCOUNTER — TRANSITIONAL CARE UNIT VISIT (OUTPATIENT)
Dept: GERIATRICS | Facility: CLINIC | Age: OVER 89
End: 2025-06-19
Payer: MEDICARE

## 2025-06-19 VITALS
HEART RATE: 69 BPM | OXYGEN SATURATION: 96 % | HEIGHT: 70 IN | TEMPERATURE: 97.9 F | WEIGHT: 161.3 LBS | BODY MASS INDEX: 23.09 KG/M2 | DIASTOLIC BLOOD PRESSURE: 63 MMHG | SYSTOLIC BLOOD PRESSURE: 102 MMHG | RESPIRATION RATE: 18 BRPM

## 2025-06-19 DIAGNOSIS — F02.A0 MILD DEMENTIA ASSOCIATED WITH OTHER UNDERLYING DISEASE, WITHOUT BEHAVIORAL DISTURBANCE, PSYCHOTIC DISTURBANCE, MOOD DISTURBANCE, OR ANXIETY (H): ICD-10-CM

## 2025-06-19 DIAGNOSIS — I95.1 ORTHOSTATIC HYPOTENSION: Primary | ICD-10-CM

## 2025-06-19 DIAGNOSIS — R53.81 PHYSICAL DECONDITIONING: ICD-10-CM

## 2025-06-19 NOTE — PATIENT INSTRUCTIONS
[facility]   2025     Name: Riki Alvarez   : 7/3/1934       Orders:  - Therapy to check orthostatic vital signs; update provider with results       Electronically signed by   DEEPA Rothman CNP on 2025 at 3:15 PM

## 2025-06-19 NOTE — PROGRESS NOTES
"[facility] GERIATRICS    Chief Complaint   Patient presents with    RECHECK     HPI:  Riki Alvarez is a 89 year old  (7/3/1934), who is being seen today for an episodic care visit at: [facility] ([facility]) [[id number]].       Today's concern is: ***        Allergies, and PMH/PSH reviewed in [facility] today.    REVIEW OF SYSTEMS:  {bikych49:434463}      Objective:   /63   Pulse 69   Temp 97.9  F (36.6  C)   Resp 18   Ht 1.778 m (5' 10\")   Wt 73.2 kg (161 lb 4.8 oz)   SpO2 96%   BMI 23.14 kg/m    {Nursing home physical exam :598224}      {fgslab:470607}      Assessment/Plan:    {FGS DX2:986047}    Transfer SBA  Bed Mob SBA  Stairs 4 CGA  Ambulation up to 600' With 4 WW CGA  UB dressing set up  LB dressing Min A  Toileting CGA- Min        Orders:  - Therapy to check orthostatic vital signs; update provider with results       Electronically signed by: DEEPA Rothman CNP       " 12.4   CR 0.79 0.89 0.87   ANIONGAP 8 9 11   MARITZA 8.7* 8.8 9.1   GLC 88 93 153*         Assessment/Plan:    (I95.1) Orthostatic hypotension  (primary encounter diagnosis)  Comment: Ongoing orthostatic hypotension with HTN with weakness w/activity. PTA losartan, lasix, flomax dc'd.   Plan:   - Therapy to check orthostatic vital signs; update provider with results   - Check BMP on 6/26/25  - Continue compression stockings on in the AM and off at night     (R53.81) Physical deconditioning  (F02.A0) Mild dementia associated with other underlying disease, without behavioral disturbance, psychotic disturbance, mood disturbance, or anxiety (H)  Comment: Ongoing physical deconditioning secondary to orthostatic hypotension and recent hospitalization. PTA lives alone. [facility] 14/30  Last therapy update:  Transfer SBA  Bed Mob SBA  Stairs 4 CGA  Ambulation up to 600' With 4 WW CGA  UB dressing set up  LB dressing Min A  Toileting CGA- Min  Plan:   - Continue lexapro, donepezil  - ACP to follow  - Monitor changes in mood or behaviors  - SW following for discharge planning. Plan to move [place] [facility]       Orders:  - Therapy to check orthostatic vital signs; update provider with results       Electronically signed by: DEEPA Rothman CNP

## 2025-06-25 ENCOUNTER — LAB REQUISITION (OUTPATIENT)
Dept: LAB | Facility: CLINIC | Age: OVER 89
End: 2025-06-25
Payer: MEDICARE

## 2025-06-25 DIAGNOSIS — D64.9 ANEMIA, UNSPECIFIED: ICD-10-CM

## 2025-06-25 DIAGNOSIS — I95.1 ORTHOSTATIC HYPOTENSION: ICD-10-CM

## 2025-06-26 LAB
ANION GAP SERPL CALCULATED.3IONS-SCNC: 10 MMOL/L (ref 7–15)
BUN SERPL-MCNC: 21.6 MG/DL (ref 8–23)
CALCIUM SERPL-MCNC: 9 MG/DL (ref 8.8–10.4)
CHLORIDE SERPL-SCNC: 104 MMOL/L (ref 98–107)
CREAT SERPL-MCNC: 0.91 MG/DL (ref 0.67–1.17)
EGFRCR SERPLBLD CKD-EPI 2021: 80 ML/MIN/1.73M2
GLUCOSE SERPL-MCNC: 86 MG/DL (ref 70–99)
HCO3 SERPL-SCNC: 28 MMOL/L (ref 22–29)
HGB BLD-MCNC: 10.5 G/DL (ref 13.3–17.7)
MCV RBC AUTO: 102 FL (ref 78–100)
POTASSIUM SERPL-SCNC: 4.6 MMOL/L (ref 3.4–5.3)
SODIUM SERPL-SCNC: 142 MMOL/L (ref 135–145)

## 2025-06-26 PROCEDURE — 36415 COLL VENOUS BLD VENIPUNCTURE: CPT | Performed by: NURSE PRACTITIONER

## 2025-06-26 PROCEDURE — 80048 BASIC METABOLIC PNL TOTAL CA: CPT | Performed by: NURSE PRACTITIONER

## 2025-06-26 PROCEDURE — P9604 ONE-WAY ALLOW PRORATED TRIP: HCPCS | Performed by: NURSE PRACTITIONER

## 2025-06-26 PROCEDURE — 85018 HEMOGLOBIN: CPT | Performed by: NURSE PRACTITIONER

## 2025-06-30 ENCOUNTER — DOCUMENTATION ONLY (OUTPATIENT)
Dept: GERIATRICS | Facility: CLINIC | Age: OVER 89
End: 2025-06-30
Payer: MEDICARE

## 2025-06-30 VITALS
OXYGEN SATURATION: 93 % | BODY MASS INDEX: 22.88 KG/M2 | DIASTOLIC BLOOD PRESSURE: 67 MMHG | TEMPERATURE: 98 F | RESPIRATION RATE: 16 BRPM | HEART RATE: 67 BPM | HEIGHT: 70 IN | WEIGHT: 159.8 LBS | SYSTOLIC BLOOD PRESSURE: 155 MMHG

## 2025-06-30 NOTE — PROGRESS NOTES
"[facility] GERIATRICS    Chief Complaint   Patient presents with    Nursing Home Acute     New to LTC     HPI:  Riki Alvarez is a 89 year old  (7/3/1934), who is being seen today for an episodic care visit at: [facility] () [[id number]].     Today's concern is:     Patient moved to LTC on 6/30/25.         Allergies, and PMH/PSH reviewed in [facility] today.    REVIEW OF SYSTEMS:  {dejgcg36:156176}      Objective:   BP (!) 155/67   Pulse 67   Temp 98  F (36.7  C)   Resp 16   Ht 1.778 m (5' 10\")   Wt 72.5 kg (159 lb 12.8 oz)   SpO2 93%   BMI 22.93 kg/m    {Nursing home physical exam :624111}    Wt Readings from Last 4 Encounters:   06/30/25 72.5 kg (159 lb 12.8 oz)   06/19/25 73.2 kg (161 lb 4.8 oz)   06/11/25 72.1 kg (159 lb)   06/09/25 72.1 kg (159 lb)       Recent labs in [facility] reviewed by me today.   Most Recent 3 CBC's:  Recent Labs   Lab Test 06/26/25  0549 06/12/25  0532 06/09/25  0608 05/27/25  0531 05/15/25  0744 05/14/25  1002 05/13/25  0615   WBC  --   --   --   --  8.2 10.1 7.8   HGB 10.5* 10.4* 10.6*   < > 12.3* 11.1* 12.0*   * 99 100   < > 96 98 95   PLT  --   --   --   --  179 160 182    < > = values in this interval not displayed.     Most Recent 3 BMP's:  Recent Labs   Lab Test 06/26/25  0549 06/09/25  0608 05/27/25  0531    141 142   POTASSIUM 4.6 4.3 3.3*   CHLORIDE 104 103 104   CO2 28 30* 29   BUN 21.6 13.9 21.2   CR 0.91 0.79 0.89   ANIONGAP 10 8 9   MARITZA 9.0 8.7* 8.8   GLC 86 88 93         Assessment/Plan:    {FGS DX2:405122}      Last therapy update:  Transfer SBA-CGA  Ambulation 200-600' with 2WW SBA-CGA  Grooming/hygiene Set up  Toileting SBA  Last day therapy 6/3/25      Orders:  - Please coordinate walking/exercise program with staff prior to ending therapy      Electronically signed by: DEEPA Rothman CNP       "

## 2025-07-01 ENCOUNTER — NURSING HOME VISIT (OUTPATIENT)
Dept: GERIATRICS | Facility: CLINIC | Age: OVER 89
End: 2025-07-01
Payer: MEDICARE

## 2025-07-01 DIAGNOSIS — F02.A0 MILD DEMENTIA ASSOCIATED WITH OTHER UNDERLYING DISEASE, WITHOUT BEHAVIORAL DISTURBANCE, PSYCHOTIC DISTURBANCE, MOOD DISTURBANCE, OR ANXIETY (H): ICD-10-CM

## 2025-07-01 DIAGNOSIS — R53.81 PHYSICAL DECONDITIONING: ICD-10-CM

## 2025-07-01 DIAGNOSIS — I95.1 ORTHOSTATIC HYPOTENSION: Primary | ICD-10-CM

## 2025-07-01 NOTE — PATIENT INSTRUCTIONS
[facility] Geriatrics   2025     Name: Riki Alvarez   : 7/3/1934       Orders:  - Please coordinate walking/exercise program with staff prior to ending therapy      Electronically signed by   DEEPA Rothman CNP on 2025 at 1:54 PM

## 2025-07-17 ENCOUNTER — NURSING HOME VISIT (OUTPATIENT)
Dept: GERIATRICS | Facility: CLINIC | Age: OVER 89
End: 2025-07-17
Payer: MEDICARE

## 2025-07-17 VITALS
SYSTOLIC BLOOD PRESSURE: 124 MMHG | HEIGHT: 70 IN | WEIGHT: 170 LBS | HEART RATE: 63 BPM | BODY MASS INDEX: 24.34 KG/M2 | DIASTOLIC BLOOD PRESSURE: 61 MMHG | OXYGEN SATURATION: 97 % | RESPIRATION RATE: 18 BRPM | TEMPERATURE: 97.6 F

## 2025-07-17 DIAGNOSIS — R53.81 PHYSICAL DECONDITIONING: ICD-10-CM

## 2025-07-17 DIAGNOSIS — F02.A0 MILD DEMENTIA ASSOCIATED WITH OTHER UNDERLYING DISEASE, WITHOUT BEHAVIORAL DISTURBANCE, PSYCHOTIC DISTURBANCE, MOOD DISTURBANCE, OR ANXIETY (H): ICD-10-CM

## 2025-07-17 DIAGNOSIS — N40.0 BPH WITHOUT URINARY OBSTRUCTION: Primary | ICD-10-CM

## 2025-07-17 DIAGNOSIS — I95.1 ORTHOSTATIC HYPOTENSION: ICD-10-CM

## 2025-07-17 DIAGNOSIS — R30.0 DYSURIA: ICD-10-CM

## 2025-07-17 PROCEDURE — 99309 SBSQ NF CARE MODERATE MDM 30: CPT | Performed by: NURSE PRACTITIONER

## 2025-07-17 NOTE — PROGRESS NOTES
"[facility] GERIATRICS    Chief Complaint   Patient presents with    RECHECK     HPI:  Riki Alvarez is a 89 year old  (7/3/1934), who is being seen today for an episodic care visit at: [facility] () [[id number]].       Today's concern is:     Intermittent dizziness w/standing and resolves after a few secondary; primarily in the morning.   Endorses urinary urgency and mild dysuria. Wears depends at night. Sleeping well at night.         Allergies, and PMH/PSH reviewed in [facility] today.    REVIEW OF SYSTEMS:  {samlev24:979006}      Objective:   /61   Pulse 63   Temp 97.6  F (36.4  C)   Resp 18   Ht 1.778 m (5' 10\")   Wt 77.1 kg (170 lb)   SpO2 97%   BMI 24.39 kg/m    {Nursing home physical exam :349873}      Recent labs in [facility] reviewed by me today.  and Most Recent 3 CBC's:  Recent Labs   Lab Test 06/26/25  0549 06/12/25  0532 06/09/25  0608 05/27/25  0531 05/15/25  0744 05/14/25  1002 05/13/25  0615   WBC  --   --   --   --  8.2 10.1 7.8   HGB 10.5* 10.4* 10.6*   < > 12.3* 11.1* 12.0*   * 99 100   < > 96 98 95   PLT  --   --   --   --  179 160 182    < > = values in this interval not displayed.     Most Recent 3 BMP's:  Recent Labs   Lab Test 06/26/25  0549 06/09/25  0608 05/27/25  0531    141 142   POTASSIUM 4.6 4.3 3.3*   CHLORIDE 104 103 104   CO2 28 30* 29   BUN 21.6 13.9 21.2   CR 0.91 0.79 0.89   ANIONGAP 10 8 9   MARITZA 9.0 8.7* 8.8   GLC 86 88 93         Assessment/Plan:    {FGS DX2:802230}    Uncircumcised   - Continue detrol   - Staff to assist with pericares BID; reviewed with RN manager  Follow-up with Dr. Butler (Urology)       Orders:  - Therapy to add walking program/maintenance program prior to ending therapy   - Check UA/UC dx dysuria, urinary urgency    - Antifungal cream ***       Electronically signed by: DEEPA Rothman CNP       "         Assessment/Plan:    (N40.0) BPH without urinary obstruction  (primary encounter diagnosis)  (R30.0) Dysuria  Comment: BPH w/urinary urgency and dysuria. Afebrile.  Plan:   - Check UA/UC dx dysuria, urinary urgency  - Continue detrol   - Staff to assist with pericares BID; reviewed with RN manager  - Follow-up with [facility] at next available appointment     (I95.1) Orthostatic hypotension  (R53.81) Physical deconditioning  (F02.A0) Mild dementia associated with other underlying disease, without behavioral disturbance, psychotic disturbance, mood disturbance, or anxiety (H)  Comment:  Ongoing physical deconditioning secondary to orthostatic hypotension. PTA lives alone. SLUMS 14/30. Ambulates 200-600ft w/walker.   Plan:   - Therapy to add walking program/maintenance program prior to ending therapy   - Encourage daily activity  - Continue lexapro, donepezil  - ACP to follow  - Continue compression stockings for orthostatic hypotension  - Monitor changes in mood or behaviors      Orders:  - Therapy to add walking program/maintenance program prior to ending therapy   - Check UA/UC dx dysuria, urinary urgency      Electronically signed by: DEEPA Rothman CNP

## 2025-07-17 NOTE — PATIENT INSTRUCTIONS
[facility]   2025     Name: Riki Alvarez   : 7/3/1934       Orders:  - Therapy to add walking program/maintenance program prior to ending therapy   - Check UA/UC dx dysuria, urinary urgency      Electronically signed by   DEEPA Rothman CNP on 2025 at 3:09 PM

## 2025-07-21 ENCOUNTER — NURSING HOME VISIT (OUTPATIENT)
Dept: GERIATRICS | Facility: CLINIC | Age: OVER 89
End: 2025-07-21
Payer: MEDICARE

## 2025-07-21 ENCOUNTER — LAB REQUISITION (OUTPATIENT)
Dept: LAB | Facility: CLINIC | Age: OVER 89
End: 2025-07-21
Payer: MEDICARE

## 2025-07-21 VITALS
TEMPERATURE: 97.6 F | HEIGHT: 70 IN | RESPIRATION RATE: 18 BRPM | BODY MASS INDEX: 24.34 KG/M2 | WEIGHT: 170 LBS | OXYGEN SATURATION: 97 % | SYSTOLIC BLOOD PRESSURE: 124 MMHG | DIASTOLIC BLOOD PRESSURE: 61 MMHG | HEART RATE: 63 BPM

## 2025-07-21 DIAGNOSIS — N40.0 BPH WITHOUT URINARY OBSTRUCTION: ICD-10-CM

## 2025-07-21 DIAGNOSIS — R60.0 BILATERAL LOWER EXTREMITY EDEMA: ICD-10-CM

## 2025-07-21 DIAGNOSIS — I95.1 ORTHOSTATIC HYPOTENSION: ICD-10-CM

## 2025-07-21 DIAGNOSIS — M17.0 PRIMARY OSTEOARTHRITIS OF BOTH KNEES: ICD-10-CM

## 2025-07-21 DIAGNOSIS — M17.12 PRIMARY OSTEOARTHRITIS OF LEFT KNEE: ICD-10-CM

## 2025-07-21 DIAGNOSIS — N47.1 ACQUIRED PHIMOSIS OF PENIS: Primary | ICD-10-CM

## 2025-07-21 DIAGNOSIS — R30.0 DYSURIA: ICD-10-CM

## 2025-07-21 DIAGNOSIS — D50.9 IRON DEFICIENCY ANEMIA, UNSPECIFIED IRON DEFICIENCY ANEMIA TYPE: ICD-10-CM

## 2025-07-21 DIAGNOSIS — R30.0 DYSURIA: Primary | ICD-10-CM

## 2025-07-21 DIAGNOSIS — N47.1 ACQUIRED PHIMOSIS OF PENIS: ICD-10-CM

## 2025-07-21 DIAGNOSIS — R53.81 PHYSICAL DECONDITIONING: ICD-10-CM

## 2025-07-21 DIAGNOSIS — F02.A0 MILD DEMENTIA ASSOCIATED WITH OTHER UNDERLYING DISEASE, WITHOUT BEHAVIORAL DISTURBANCE, PSYCHOTIC DISTURBANCE, MOOD DISTURBANCE, OR ANXIETY (H): ICD-10-CM

## 2025-07-21 LAB
ALBUMIN UR-MCNC: NEGATIVE MG/DL
APPEARANCE UR: CLEAR
BILIRUB UR QL STRIP: NEGATIVE
COLOR UR AUTO: ABNORMAL
GLUCOSE UR STRIP-MCNC: NEGATIVE MG/DL
HGB UR QL STRIP: NEGATIVE
HYALINE CASTS: 1 /LPF
KETONES UR STRIP-MCNC: NEGATIVE MG/DL
LEUKOCYTE ESTERASE UR QL STRIP: ABNORMAL
MUCOUS THREADS #/AREA URNS LPF: PRESENT /LPF
NITRATE UR QL: NEGATIVE
PH UR STRIP: 6.5 [PH] (ref 5–7)
RBC URINE: 1 /HPF
SP GR UR STRIP: 1.02 (ref 1–1.03)
SQUAMOUS EPITHELIAL: <1 /HPF
UROBILINOGEN UR STRIP-MCNC: NORMAL MG/DL
WBC URINE: 2 /HPF

## 2025-07-21 PROCEDURE — 81003 URINALYSIS AUTO W/O SCOPE: CPT | Performed by: NURSE PRACTITIONER

## 2025-07-21 PROCEDURE — 99309 SBSQ NF CARE MODERATE MDM 30: CPT | Performed by: NURSE PRACTITIONER

## 2025-07-21 NOTE — PATIENT INSTRUCTIONS
[facility]   2025     Name: Riki Casas Antonio   : 7/3/1934       Orders:  - Patient to follow-up with Dr. Guy (Urology) at next available appointment, please coordinate date with Stefania (niece)      Electronically signed by  DEEPA Rothman CNP on 2025 at 3:01 PM           [facility]   2025     Name: Rikimaral Casas Antonio   : 7/3/1934       Orders:  - Check BMP, BNP on 25 dx HTN, BLE edema, elevated BNP  - Check Hgb on 25 dx anemia      Electronically signed by   DEEPA Rothman CNP on 2025 at 9:48 AM

## 2025-07-21 NOTE — PROGRESS NOTES
"[facility] GERIATRICS    Chief Complaint   Patient presents with    RECHECK     HPI:  Riki Alvarez is a 89 year old  (7/3/1934), who is being seen today for an episodic care visit at: [facility] () [[id number]].       Today's concern is: ***    L knee steroid injection approx 4 months ago, becoming more painful w/activity.           Allergies, and PMH/PSH reviewed in [facility] today.  REVIEW OF SYSTEMS:  4 point ROS including Respiratory, CV, GI and , other than that noted in the HPI,  is negative      Objective:   /61   Pulse 63   Temp 97.6  F (36.4  C)   Resp 18   Ht 1.778 m (5' 10\")   Wt 77.1 kg (170 lb)   SpO2 97%   BMI 24.39 kg/m    {Nursing home physical exam :839913}    Wt Readings from Last 4 Encounters:   07/21/25 77.1 kg (170 lb)   07/17/25 77.1 kg (170 lb)   06/30/25 72.5 kg (159 lb 12.8 oz)   06/19/25 73.2 kg (161 lb 4.8 oz)       Recent labs in [facility] reviewed by me today.  Most Recent 3 CBC's:  Recent Labs   Lab Test 06/26/25  0549 06/12/25  0532 06/09/25  0608 05/27/25  0531 05/15/25  0744 05/14/25  1002 05/13/25  0615   WBC  --   --   --   --  8.2 10.1 7.8   HGB 10.5* 10.4* 10.6*   < > 12.3* 11.1* 12.0*   * 99 100   < > 96 98 95   PLT  --   --   --   --  179 160 182    < > = values in this interval not displayed.     Most Recent 3 BMP's:  Recent Labs   Lab Test 06/26/25  0549 06/09/25  0608 05/27/25  0531    141 142   POTASSIUM 4.6 4.3 3.3*   CHLORIDE 104 103 104   CO2 28 30* 29   BUN 21.6 13.9 21.2   CR 0.91 0.79 0.89   ANIONGAP 10 8 9   MARITZA 9.0 8.7* 8.8   GLC 86 88 93     Liver Function Studies -   Recent Labs   Lab Test 05/11/25  1650   PROTTOTAL 6.4   ALBUMIN 3.6   BILITOTAL 1.2   ALKPHOS 84   AST 16   ALT 15     Ferritin   Date Value Ref Range Status   06/12/2025 229 31 - 409 ng/mL Final     Iron   Date Value Ref Range Status   06/12/2025 54 (L) 61 - 157 ug/dL Final     Iron Binding Capacity   Date Value Ref Range Status   06/12/2025 201 " (L) 240 - 430 ug/dL Final         XR bilateral knee on 4/30/25:  FINDINGS:   Severe degenerative changes, bilateral knee(s).  Severe joint space   narrowing mainly affecting the lateral compartment, bilateral knee(s).   Severe subchondral sclerosis, bilateral knee(s). Severe subchondral cyst   formation, bilateral knee(s). Severe osteophyte formation, bilateral   knee(s). Patellar tracking within normal limits. Patellofemoral   compartment with Moderate-to-severe degenerative changes, bilateral   knee(s).  Moderate-to-severe patellofemoral joint space narrowing,   bilateral knee(s).  Moderate patellofemoral osteophyte formation,   bilateral knee(s).   No evidence of acute fracture or dislocation.         Assessment/Plan:    {FGS DX2:705782}      Both knees    - Reviewed patient status and treatment plan with Stefania Kearney).       Last therapy update:  Transfer SBA  Bed Mob SBA  Ambulation 300-500' With 2WW SBA      Orders:  - Patient to follow-up with Dr. Guy (Urology) at next available appointment, please coordinate date with Stefania kearney)  - Check BMP, BNP on 7/23/25 dx HTN, BLE edema, elevated BNP  - Check Hgb on 7/23/25 dx anemia      Electronically signed by: DEEPA Rothman CNP        collected, reviewed with RN manager. Collected UA/UC today, results pending.   - Continue detrol   - Patient to follow-up with Dr. Guy ([facility]) at next available appointment, please coordinate date with Stefania kearney)  - Reviewed patient status and treatment plan with Stefania Kearney).     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 Mild-moderate dementia with ongoing physical deconditioning. SLUMS 14/30. Ambulates 200-600ft w/walker.   Last therapy update:  Transfer SBA  Bed Mob SBA  Ambulation 300-500' With 2WW SBA  Plan:   - Continue PT, OT  - Encourage daily activity  - Continue lexapro, donepezil  - ACP to follow    (D50.9) Iron deficiency anemia, unspecified iron deficiency anemia type  Comment: Chronic  Plan:   - Check Hgb on 7/23/25 dx anemia  - Continue ferrous sulfate three times/week    (I95.1) Orthostatic hypotension  (R60.0) Bilateral lower extremity edema  Comment: Chronic orthostatic hypotension, BP improving. BLE edema.   Plan:   - Check BMP, BNP on 7/23/25 dx HTN, BLE edema, elevated BNP  - Continue to wear compression stockings  - Monitor weights      Orders:  - Patient to follow-up with Dr. Guy ([facility]) at next available appointment, please coordinate date with Stefania kearney)  - Check BMP, BNP on 7/23/25 dx HTN, BLE edema, elevated BNP  - Check Hgb on 7/23/25 dx anemia      Electronically signed by: DEEPA Rothman CNP

## 2025-07-22 ENCOUNTER — LAB REQUISITION (OUTPATIENT)
Dept: LAB | Facility: CLINIC | Age: OVER 89
End: 2025-07-22
Payer: MEDICARE

## 2025-07-22 VITALS
DIASTOLIC BLOOD PRESSURE: 66 MMHG | BODY MASS INDEX: 23.94 KG/M2 | TEMPERATURE: 97 F | SYSTOLIC BLOOD PRESSURE: 128 MMHG | WEIGHT: 167.2 LBS | HEART RATE: 72 BPM | HEIGHT: 70 IN | RESPIRATION RATE: 18 BRPM | OXYGEN SATURATION: 95 %

## 2025-07-22 DIAGNOSIS — D64.9 ANEMIA, UNSPECIFIED: ICD-10-CM

## 2025-07-22 DIAGNOSIS — R60.9 EDEMA, UNSPECIFIED: ICD-10-CM

## 2025-07-22 NOTE — PROGRESS NOTES
"[facility] GERIATRICS    Chief Complaint   Patient presents with    RECHECK     HPI:  Riki Alvarez is a 89 year old  (7/3/1934), who is being seen today for an episodic care visit at: [facility] () [[id number]].       Today's concern is: ***        Allergies, and PMH/PSH reviewed in [facility] today.    REVIEW OF SYSTEMS:  4 point ROS including Respiratory, CV, GI and , other than that noted in the HPI,  is negative      Objective:   /66   Pulse 72   Temp 97  F (36.1  C)   Resp 18   Ht 1.778 m (5' 10\")   Wt 75.8 kg (167 lb 3.2 oz)   SpO2 95%   BMI 23.99 kg/m    {Nursing home physical exam :213381}    Wt Readings from Last 4 Encounters:   07/22/25 75.8 kg (167 lb 3.2 oz)   07/21/25 77.1 kg (170 lb)   07/17/25 77.1 kg (170 lb)   06/30/25 72.5 kg (159 lb 12.8 oz)       Recent labs in [facility] reviewed by me today.   Most Recent 3 CBC's:  Recent Labs   Lab Test 07/23/25  0515 06/26/25  0549 06/12/25  0532 05/27/25  0531 05/15/25  0744 05/14/25  1002 05/13/25  0615   WBC  --   --   --   --  8.2 10.1 7.8   HGB 10.6* 10.5* 10.4*   < > 12.3* 11.1* 12.0*    102* 99   < > 96 98 95   PLT  --   --   --   --  179 160 182    < > = values in this interval not displayed.     Most Recent 3 BMP's:  Recent Labs   Lab Test 07/23/25  0515 06/26/25  0549 06/09/25  0608    142 141   POTASSIUM 4.3 4.6 4.3   CHLORIDE 107 104 103   CO2 27 28 30*   BUN 18.1 21.6 13.9   CR 0.92 0.91 0.79   ANIONGAP 8 10 8   MARITZA 8.9 9.0 8.7*   GLC 84 86 88       Liver Function Studies -   Recent Labs   Lab Test 05/11/25  1650   PROTTOTAL 6.4   ALBUMIN 3.6   BILITOTAL 1.2   ALKPHOS 84   AST 16   ALT 15       TSH   Date Value Ref Range Status   12/31/2024 1.28 0.30 - 4.20 uIU/mL Final        Latest Reference Range & Units 07/23/25 05:15   N-Terminal Pro Bnp 0 - 852 pg/mL 545         Assessment/Plan:    {FGS DX2:297995}      Ortho NP ***   - Message sent to Shereen Islas NP regarding onsite bilateral knee " steroid injections  L foot turns outward      R heel stage II pressure ulcer   - Wound care team to follow  - Change dressing daily       BUE lymphedema +3   - Add lasix 20mg every day      Dyspnea w/activity, resolves at rest.   - Therapy following       Orders:  - Add sheepskin heel protector, apply to R heel at night and off in the morning   - Add lasix 20mg every day, start 7/24/25 dx BUE edema  - Recheck CMP on 7/29/25 dx BUE edema  - Therapy to eval/treat BUE lymphedema   - Check orthostatic vital signs on Monday AM shift; update provider with results via phone  - Wound care team to follow        [id number]    Total time spent during today's visit was *** mins including patient visit and review of past records. Time also spent coordinating care with ***.     Electronically signed by: DEEPA Rothman CNP ***       normal.  Plan:   - Add lasix 20mg every day   - Monitor BMP  - Check orthostatic vital signs on Monday AM shift; update provider with results via phone    (N98.567V) Abrasion of right heel, initial encounter  (R60.0) Bilateral lower extremity edema  Comment: New R heel abrasion with intermittent BLE edema.   Plan:   - Wound care team to follow  - Change dressing daily   - Add sheepskin heel protector, apply to R heel at night and off in the morning   - Add lasix 20mg every day, start 7/24/25 dx BUE edema  - Recheck CMP on 7/29/25 dx BUE edema  - Therapy to eval/treat BUE lymphedema   - Reviewed patient status and treatment plan with Stefania (Niece).     (M17.0) Primary osteoarthritis of both knees  Chronic bilateral knee osteoarthritis w/increased pain. Last bilateral knee steroid injections > 4 months ago.   Plan:   - Continue tylenol PRN  - Message sent to Elba HILL Ortho NP regarding onsite bilateral knee steroid injections  L foot turns outward      Orders:  - Add sheepskin heel protector, apply to R heel at night and off in the morning   - Add lasix 20mg every day, start 7/24/25 dx BUE edema  - Recheck CMP on 7/29/25 dx BUE edema  - Therapy to eval/treat BUE lymphedema   - Check orthostatic vital signs on Monday AM shift; update provider with results via phone  - Wound care team to follow        Total time spent during today's visit was 45 mins including patient visit ([id number]; 22 minutes) and review of past records (20 minutes). Time also spent coordinating care with Ortho NP (3 minutes) regarding request for bilateral knee steroid injections.     Electronically signed by: DEEPA Rothman CNP

## 2025-07-23 ENCOUNTER — NURSING HOME VISIT (OUTPATIENT)
Dept: GERIATRICS | Facility: CLINIC | Age: OVER 89
End: 2025-07-23
Payer: MEDICARE

## 2025-07-23 DIAGNOSIS — R60.0 BILATERAL LOWER EXTREMITY EDEMA: ICD-10-CM

## 2025-07-23 DIAGNOSIS — R06.09 DYSPNEA ON EXERTION: ICD-10-CM

## 2025-07-23 DIAGNOSIS — N47.1 ACQUIRED PHIMOSIS OF PENIS: ICD-10-CM

## 2025-07-23 DIAGNOSIS — M17.0 PRIMARY OSTEOARTHRITIS OF BOTH KNEES: ICD-10-CM

## 2025-07-23 DIAGNOSIS — S90.811A ABRASION OF RIGHT HEEL, INITIAL ENCOUNTER: ICD-10-CM

## 2025-07-23 DIAGNOSIS — N40.0 BPH WITHOUT URINARY OBSTRUCTION: Primary | ICD-10-CM

## 2025-07-23 DIAGNOSIS — I89.0 LYMPHEDEMA OF UPPER EXTREMITY, BILATERAL: ICD-10-CM

## 2025-07-23 LAB
ANION GAP SERPL CALCULATED.3IONS-SCNC: 8 MMOL/L (ref 7–15)
BUN SERPL-MCNC: 18.1 MG/DL (ref 8–23)
CALCIUM SERPL-MCNC: 8.9 MG/DL (ref 8.8–10.4)
CHLORIDE SERPL-SCNC: 107 MMOL/L (ref 98–107)
CREAT SERPL-MCNC: 0.92 MG/DL (ref 0.67–1.17)
EGFRCR SERPLBLD CKD-EPI 2021: 79 ML/MIN/1.73M2
GLUCOSE SERPL-MCNC: 84 MG/DL (ref 70–99)
HCO3 SERPL-SCNC: 27 MMOL/L (ref 22–29)
HGB BLD-MCNC: 10.6 G/DL (ref 13.3–17.7)
MCV RBC AUTO: 100 FL (ref 78–100)
NT-PROBNP SERPL-MCNC: 545 PG/ML (ref 0–852)
POTASSIUM SERPL-SCNC: 4.3 MMOL/L (ref 3.4–5.3)
SODIUM SERPL-SCNC: 142 MMOL/L (ref 135–145)

## 2025-07-23 PROCEDURE — 82947 ASSAY GLUCOSE BLOOD QUANT: CPT | Performed by: NURSE PRACTITIONER

## 2025-07-23 PROCEDURE — 83880 ASSAY OF NATRIURETIC PEPTIDE: CPT | Performed by: NURSE PRACTITIONER

## 2025-07-23 PROCEDURE — 84132 ASSAY OF SERUM POTASSIUM: CPT | Performed by: NURSE PRACTITIONER

## 2025-07-23 PROCEDURE — 36415 COLL VENOUS BLD VENIPUNCTURE: CPT | Performed by: NURSE PRACTITIONER

## 2025-07-23 PROCEDURE — 85018 HEMOGLOBIN: CPT | Performed by: NURSE PRACTITIONER

## 2025-07-23 PROCEDURE — P9604 ONE-WAY ALLOW PRORATED TRIP: HCPCS | Performed by: NURSE PRACTITIONER

## 2025-07-23 PROCEDURE — 80048 BASIC METABOLIC PNL TOTAL CA: CPT | Performed by: NURSE PRACTITIONER

## 2025-07-23 PROCEDURE — 82310 ASSAY OF CALCIUM: CPT | Performed by: NURSE PRACTITIONER

## 2025-07-23 PROCEDURE — 99310 SBSQ NF CARE HIGH MDM 45: CPT | Performed by: NURSE PRACTITIONER

## 2025-07-23 NOTE — PATIENT INSTRUCTIONS
[facility]   2025     Name: Riki Alvarez   : 7/3/1934       Orders:  - Add sheepskin heel protector, apply to R heel at night and off in the morning   - Add lasix 20mg every day, start 25 dx BUE edema  - Recheck CMP on 25 dx BUE edema  - Therapy to eval/treat BUE lymphedema   - Check orthostatic vital signs on Monday AM shift; update provider with results via phone      Electronically signed by   DEEPA Rothman CNP on 2025 at 1:47 PM             [facility]   2025     Name: Riki Alvarez   : 7/3/1934       Orders:  - Wound care team to follow      Electronically signed by   DEEPA Rothman CNP on 2025 at 5:20 PM

## 2025-07-28 ENCOUNTER — NURSING HOME VISIT (OUTPATIENT)
Dept: GERIATRICS | Facility: CLINIC | Age: OVER 89
End: 2025-07-28
Payer: MEDICARE

## 2025-07-28 ENCOUNTER — LAB REQUISITION (OUTPATIENT)
Dept: LAB | Facility: CLINIC | Age: OVER 89
End: 2025-07-28
Payer: MEDICARE

## 2025-07-28 VITALS
OXYGEN SATURATION: 94 % | RESPIRATION RATE: 18 BRPM | WEIGHT: 167.2 LBS | HEIGHT: 70 IN | BODY MASS INDEX: 23.94 KG/M2 | DIASTOLIC BLOOD PRESSURE: 51 MMHG | SYSTOLIC BLOOD PRESSURE: 114 MMHG | HEART RATE: 76 BPM | TEMPERATURE: 96.9 F

## 2025-07-28 DIAGNOSIS — M17.0 PRIMARY OSTEOARTHRITIS OF BOTH KNEES: ICD-10-CM

## 2025-07-28 DIAGNOSIS — I89.0 LYMPHEDEMA OF UPPER EXTREMITY, BILATERAL: Primary | ICD-10-CM

## 2025-07-28 DIAGNOSIS — R60.9 EDEMA, UNSPECIFIED: ICD-10-CM

## 2025-07-28 PROCEDURE — 99309 SBSQ NF CARE MODERATE MDM 30: CPT | Performed by: NURSE PRACTITIONER

## 2025-07-28 RX ORDER — FUROSEMIDE 20 MG/1
10 TABLET ORAL DAILY
Qty: 30 TABLET | Refills: 0 | Status: SHIPPED | OUTPATIENT
Start: 2025-07-28

## 2025-07-28 NOTE — PATIENT INSTRUCTIONS
[facility]   2025     Name: Riki Alvarez   : 7/3/1934       Orders:  - Decrease lasix to 10mg every day dx edema      Electronically signed by  DEEPA Rothman CNP on 2025 at 3:54 PM

## 2025-07-28 NOTE — PROGRESS NOTES
"[facility] GERIATRICS    Chief Complaint   Patient presents with    RECHECK     HPI:  Riki Alvarez is a 89 year old  (7/3/1934), who is being [facility] today [facility] [[id number]       Today's concern is: ***        Allergies, and PMH/PSH reviewed in [facility] today.    REVIEW OF SYSTEMS:  {ebgarn10:014469}      Objective:   /51   Pulse 76   Temp 96.9  F (36.1  C)   Resp 18   Ht 1.778 m (5' 10\")   Wt 75.8 kg (167 lb 3.2 oz)   SpO2 94%   BMI 23.99 kg/m    {Nursing home physical exam :978910}    Wt Readings from Last 4 Encounters:   07/28/25 75.8 kg (167 lb 3.2 oz)   07/22/25 75.8 kg (167 lb 3.2 oz)   07/21/25 77.1 kg (170 lb)   07/17/25 77.1 kg (170 lb)       Recent labs in [facility] reviewed by me today.   Most Recent 3 CBC's:  Recent Labs   Lab Test 07/23/25  0515 06/26/25  0549 06/12/25  0532 05/27/25  0531 05/15/25  0744 05/14/25  1002 05/13/25  0615   WBC  --   --   --   --  8.2 10.1 7.8   HGB 10.6* 10.5* 10.4*   < > 12.3* 11.1* 12.0*    102* 99   < > 96 98 95   PLT  --   --   --   --  179 160 182    < > = values in this interval not displayed.     Most Recent 3 BMP's:  Recent Labs   Lab Test 07/23/25  0515 06/26/25  0549 06/09/25  0608    142 141   POTASSIUM 4.3 4.6 4.3   CHLORIDE 107 104 103   CO2 27 28 30*   BUN 18.1 21.6 13.9   CR 0.92 0.91 0.79   ANIONGAP 8 10 8   MARITZA 8.9 9.0 8.7*   GLC 84 86 88         Assessment/Plan:    {FGS DX2:124869}          Orders:  - Decrease lasix to 10mg every day dx edema      Electronically signed by: DEEPA Rothman CNP       " --  179 160 182    < > = values in this interval not displayed.     Most Recent 3 BMP's:  Recent Labs   Lab Test 07/23/25  0515 06/26/25  0549 06/09/25  0608    142 141   POTASSIUM 4.3 4.6 4.3   CHLORIDE 107 104 103   CO2 27 28 30*   BUN 18.1 21.6 13.9   CR 0.92 0.91 0.79   ANIONGAP 8 10 8   MARITZA 8.9 9.0 8.7*   GLC 84 86 88     Liver Function Studies -   Recent Labs   Lab Test 07/29/25  0510   PROTTOTAL 5.5*   ALBUMIN 3.1*   BILITOTAL 0.6   ALKPHOS 76   AST 14   ALT <5         Assessment/Plan:    (I89.0) Lymphedema of upper extremity, bilateral  (primary encounter diagnosis)  Comment: Improving  Plan:   - Decrease lasix to 10mg every day dx edema  - Continue to wear TG shapes to BUE on in the morning and off at night    M17.0) Primary osteoarthritis of both knees  Chronic bilateral knee osteoarthritis w/increased pain. Last bilateral knee steroid injections > 4 months ago.   Plan:   - Continue tylenol PRN; consider scheduling tylenol, patient declined   - Shereen Islas NP plans to complete onsite steroid injection      Orders:  - Decrease lasix to 10mg every day dx edema      Electronically signed by: DEEPA Rothman CNP

## 2025-07-29 LAB
ALBUMIN SERPL BCG-MCNC: 3.1 G/DL (ref 3.5–5.2)
ALP SERPL-CCNC: 76 U/L (ref 40–150)
ALT SERPL W P-5'-P-CCNC: <5 U/L (ref 0–70)
ANION GAP SERPL CALCULATED.3IONS-SCNC: 11 MMOL/L (ref 7–15)
AST SERPL W P-5'-P-CCNC: 14 U/L (ref 0–45)
BILIRUB SERPL-MCNC: 0.6 MG/DL
BUN SERPL-MCNC: 22.9 MG/DL (ref 8–23)
CALCIUM SERPL-MCNC: 9 MG/DL (ref 8.8–10.4)
CHLORIDE SERPL-SCNC: 104 MMOL/L (ref 98–107)
CREAT SERPL-MCNC: 0.95 MG/DL (ref 0.67–1.17)
EGFRCR SERPLBLD CKD-EPI 2021: 76 ML/MIN/1.73M2
GLUCOSE SERPL-MCNC: 85 MG/DL (ref 70–99)
HCO3 SERPL-SCNC: 27 MMOL/L (ref 22–29)
POTASSIUM SERPL-SCNC: 4.4 MMOL/L (ref 3.4–5.3)
PROT SERPL-MCNC: 5.5 G/DL (ref 6.4–8.3)
SODIUM SERPL-SCNC: 142 MMOL/L (ref 135–145)

## 2025-08-01 ENCOUNTER — NURSING HOME VISIT (OUTPATIENT)
Dept: GERIATRICS | Facility: CLINIC | Age: OVER 89
End: 2025-08-01
Payer: MEDICARE

## 2025-08-01 VITALS
BODY MASS INDEX: 23.91 KG/M2 | HEIGHT: 70 IN | WEIGHT: 167 LBS | OXYGEN SATURATION: 94 % | SYSTOLIC BLOOD PRESSURE: 114 MMHG | DIASTOLIC BLOOD PRESSURE: 51 MMHG | HEART RATE: 76 BPM | RESPIRATION RATE: 18 BRPM | TEMPERATURE: 96.9 F

## 2025-08-01 DIAGNOSIS — M17.0 PRIMARY OSTEOARTHRITIS OF BOTH KNEES: Primary | ICD-10-CM

## 2025-08-01 PROCEDURE — 20610 DRAIN/INJ JOINT/BURSA W/O US: CPT | Mod: LT | Performed by: NURSE PRACTITIONER

## 2025-08-01 RX ADMIN — METHYLPREDNISOLONE ACETATE 40 MG: 40 INJECTION, SUSPENSION INTRA-ARTICULAR; INTRALESIONAL; INTRAMUSCULAR; SOFT TISSUE at 10:00

## 2025-08-01 RX ADMIN — LIDOCAINE HYDROCHLORIDE 4 ML: 10 INJECTION, SOLUTION INFILTRATION; PERINEURAL at 10:00

## 2025-08-04 ENCOUNTER — TELEPHONE (OUTPATIENT)
Dept: GERIATRICS | Facility: CLINIC | Age: OVER 89
End: 2025-08-04
Payer: MEDICARE

## 2025-08-04 ENCOUNTER — LAB REQUISITION (OUTPATIENT)
Dept: LAB | Facility: CLINIC | Age: OVER 89
End: 2025-08-04
Payer: MEDICARE

## 2025-08-04 VITALS
OXYGEN SATURATION: 94 % | HEIGHT: 70 IN | WEIGHT: 164.5 LBS | TEMPERATURE: 98.2 F | SYSTOLIC BLOOD PRESSURE: 144 MMHG | HEART RATE: 69 BPM | BODY MASS INDEX: 23.55 KG/M2 | RESPIRATION RATE: 18 BRPM | DIASTOLIC BLOOD PRESSURE: 63 MMHG

## 2025-08-04 DIAGNOSIS — E78.5 HYPERLIPIDEMIA, UNSPECIFIED: ICD-10-CM

## 2025-08-04 DIAGNOSIS — I10 ESSENTIAL (PRIMARY) HYPERTENSION: ICD-10-CM

## 2025-08-04 DIAGNOSIS — D64.9 ANEMIA, UNSPECIFIED: ICD-10-CM

## 2025-08-04 RX ORDER — GUAIFENESIN 400 MG/1
TABLET ORAL
COMMUNITY
Start: 2025-08-04

## 2025-08-04 RX ORDER — IPRATROPIUM BROMIDE AND ALBUTEROL SULFATE 2.5; .5 MG/3ML; MG/3ML
1 SOLUTION RESPIRATORY (INHALATION) 3 TIMES DAILY
COMMUNITY
Start: 2025-08-04

## 2025-08-05 ENCOUNTER — NURSING HOME VISIT (OUTPATIENT)
Dept: GERIATRICS | Facility: CLINIC | Age: OVER 89
End: 2025-08-05
Payer: MEDICARE

## 2025-08-05 DIAGNOSIS — N47.1 ACQUIRED PHIMOSIS OF PENIS: ICD-10-CM

## 2025-08-05 DIAGNOSIS — R06.09 DYSPNEA ON EXERTION: Primary | ICD-10-CM

## 2025-08-05 DIAGNOSIS — R05.3 CHRONIC COUGH: ICD-10-CM

## 2025-08-05 DIAGNOSIS — J18.9 RECURRENT PNEUMONIA: ICD-10-CM

## 2025-08-05 DIAGNOSIS — M17.0 PRIMARY OSTEOARTHRITIS OF BOTH KNEES: ICD-10-CM

## 2025-08-05 DIAGNOSIS — N40.0 BPH WITHOUT URINARY OBSTRUCTION: ICD-10-CM

## 2025-08-05 DIAGNOSIS — I89.0 LYMPHEDEMA OF UPPER EXTREMITY, BILATERAL: ICD-10-CM

## 2025-08-05 LAB
ANION GAP SERPL CALCULATED.3IONS-SCNC: 8 MMOL/L (ref 7–15)
BASOPHILS # BLD AUTO: 0.1 10E3/UL (ref 0–0.2)
BASOPHILS NFR BLD AUTO: 1 %
BUN SERPL-MCNC: 17.7 MG/DL (ref 8–23)
CALCIUM SERPL-MCNC: 8.7 MG/DL (ref 8.8–10.4)
CHLORIDE SERPL-SCNC: 103 MMOL/L (ref 98–107)
CHOLEST SERPL-MCNC: 135 MG/DL
CREAT SERPL-MCNC: 0.9 MG/DL (ref 0.67–1.17)
EGFRCR SERPLBLD CKD-EPI 2021: 81 ML/MIN/1.73M2
EOSINOPHIL # BLD AUTO: 0.3 10E3/UL (ref 0–0.7)
EOSINOPHIL NFR BLD AUTO: 5 %
ERYTHROCYTE [DISTWIDTH] IN BLOOD BY AUTOMATED COUNT: 12.3 % (ref 10–15)
FASTING STATUS PATIENT QL REPORTED: NORMAL
GLUCOSE SERPL-MCNC: 89 MG/DL (ref 70–99)
HCO3 SERPL-SCNC: 29 MMOL/L (ref 22–29)
HCT VFR BLD AUTO: 34.7 % (ref 40–53)
HDLC SERPL-MCNC: 66 MG/DL
HGB BLD-MCNC: 11.2 G/DL (ref 13.3–17.7)
IMM GRANULOCYTES # BLD: 0 10E3/UL
IMM GRANULOCYTES NFR BLD: 1 %
LDLC SERPL CALC-MCNC: 61 MG/DL
LYMPHOCYTES # BLD AUTO: 0.8 10E3/UL (ref 0.8–5.3)
LYMPHOCYTES NFR BLD AUTO: 12 %
MCH RBC QN AUTO: 31.6 PG (ref 26.5–33)
MCHC RBC AUTO-ENTMCNC: 32.3 G/DL (ref 31.5–36.5)
MCV RBC AUTO: 98 FL (ref 78–100)
MONOCYTES # BLD AUTO: 0.7 10E3/UL (ref 0–1.3)
MONOCYTES NFR BLD AUTO: 11 %
NEUTROPHILS # BLD AUTO: 4.8 10E3/UL (ref 1.6–8.3)
NEUTROPHILS NFR BLD AUTO: 71 %
NONHDLC SERPL-MCNC: 69 MG/DL
NRBC # BLD AUTO: 0 10E3/UL
NRBC BLD AUTO-RTO: 0 /100
PLATELET # BLD AUTO: 222 10E3/UL (ref 150–450)
POTASSIUM SERPL-SCNC: 4.3 MMOL/L (ref 3.4–5.3)
RBC # BLD AUTO: 3.54 10E6/UL (ref 4.4–5.9)
SODIUM SERPL-SCNC: 140 MMOL/L (ref 135–145)
TRIGL SERPL-MCNC: 40 MG/DL
WBC # BLD AUTO: 6.8 10E3/UL (ref 4–11)

## 2025-08-05 PROCEDURE — 99310 SBSQ NF CARE HIGH MDM 45: CPT | Performed by: NURSE PRACTITIONER

## 2025-08-06 ENCOUNTER — NURSING HOME VISIT (OUTPATIENT)
Dept: GERIATRICS | Facility: CLINIC | Age: OVER 89
End: 2025-08-06
Payer: MEDICARE

## 2025-08-06 VITALS
OXYGEN SATURATION: 96 % | TEMPERATURE: 97.6 F | HEIGHT: 69 IN | HEART RATE: 75 BPM | DIASTOLIC BLOOD PRESSURE: 68 MMHG | RESPIRATION RATE: 16 BRPM | SYSTOLIC BLOOD PRESSURE: 124 MMHG | WEIGHT: 164.5 LBS | BODY MASS INDEX: 24.37 KG/M2

## 2025-08-06 DIAGNOSIS — K27.9 PUD (PEPTIC ULCER DISEASE): ICD-10-CM

## 2025-08-06 DIAGNOSIS — D64.9 ANEMIA, UNSPECIFIED TYPE: ICD-10-CM

## 2025-08-06 DIAGNOSIS — J18.9 PNEUMONIA OF LEFT LOWER LOBE DUE TO INFECTIOUS ORGANISM: Primary | ICD-10-CM

## 2025-08-06 DIAGNOSIS — R53.1 GENERAL WEAKNESS: ICD-10-CM

## 2025-08-06 DIAGNOSIS — F03.B4 MODERATE DEMENTIA WITH ANXIETY, UNSPECIFIED DEMENTIA TYPE (H): ICD-10-CM

## 2025-08-06 DIAGNOSIS — N40.0 BPH WITHOUT URINARY OBSTRUCTION: ICD-10-CM

## 2025-08-06 DIAGNOSIS — K44.9 HIATAL HERNIA: ICD-10-CM

## 2025-08-06 PROCEDURE — 99309 SBSQ NF CARE MODERATE MDM 30: CPT | Performed by: INTERNAL MEDICINE

## 2025-08-07 RX ORDER — AZITHROMYCIN 250 MG/1
TABLET, FILM COATED ORAL
Qty: 6 TABLET | Refills: 0 | COMMUNITY
Start: 2025-08-05 | End: 2025-08-12

## 2025-08-07 RX ORDER — CEFPODOXIME PROXETIL 200 MG/1
200 TABLET, FILM COATED ORAL 2 TIMES DAILY
COMMUNITY
Start: 2025-08-06

## 2025-08-08 ENCOUNTER — TELEPHONE (OUTPATIENT)
Dept: GERIATRICS | Facility: CLINIC | Age: OVER 89
End: 2025-08-08

## 2025-08-11 ENCOUNTER — NURSING HOME VISIT (OUTPATIENT)
Dept: GERIATRICS | Facility: CLINIC | Age: OVER 89
End: 2025-08-11
Payer: MEDICARE

## 2025-08-11 ENCOUNTER — LAB REQUISITION (OUTPATIENT)
Dept: LAB | Facility: CLINIC | Age: OVER 89
End: 2025-08-11
Payer: MEDICARE

## 2025-08-11 VITALS
BODY MASS INDEX: 24.37 KG/M2 | TEMPERATURE: 97.8 F | SYSTOLIC BLOOD PRESSURE: 136 MMHG | HEIGHT: 69 IN | RESPIRATION RATE: 16 BRPM | DIASTOLIC BLOOD PRESSURE: 63 MMHG | WEIGHT: 164.5 LBS | OXYGEN SATURATION: 95 % | HEART RATE: 75 BPM

## 2025-08-11 DIAGNOSIS — J18.9 PNEUMONIA OF LEFT LOWER LOBE DUE TO INFECTIOUS ORGANISM: Primary | ICD-10-CM
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
## 2025-08-11 DIAGNOSIS — J18.9 RECURRENT PNEUMONIA: ICD-10-CM

## 2025-08-11 DIAGNOSIS — R11.0 NAUSEA: ICD-10-CM

## 2025-08-11 DIAGNOSIS — J96.01 ACUTE RESPIRATORY FAILURE WITH HYPOXIA (H): ICD-10-CM

## 2025-08-11 DIAGNOSIS — J18.9 PNEUMONIA, UNSPECIFIED ORGANISM: ICD-10-CM

## 2025-08-11 PROCEDURE — 99310 SBSQ NF CARE HIGH MDM 45: CPT | Performed by: NURSE PRACTITIONER

## 2025-08-12 LAB
ANION GAP SERPL CALCULATED.3IONS-SCNC: 11 MMOL/L (ref 7–15)
BUN SERPL-MCNC: 13.8 MG/DL (ref 8–23)
CALCIUM SERPL-MCNC: 8.5 MG/DL (ref 8.8–10.4)
CHLORIDE SERPL-SCNC: 105 MMOL/L (ref 98–107)
CREAT SERPL-MCNC: 0.88 MG/DL (ref 0.67–1.17)
EGFRCR SERPLBLD CKD-EPI 2021: 81 ML/MIN/1.73M2
ERYTHROCYTE [DISTWIDTH] IN BLOOD BY AUTOMATED COUNT: 12 % (ref 10–15)
GLUCOSE SERPL-MCNC: 98 MG/DL (ref 70–99)
HCO3 SERPL-SCNC: 24 MMOL/L (ref 22–29)
HCT VFR BLD AUTO: 31.3 % (ref 40–53)
HGB BLD-MCNC: 10.2 G/DL (ref 13.3–17.7)
MCH RBC QN AUTO: 31.5 PG (ref 26.5–33)
MCHC RBC AUTO-ENTMCNC: 32.6 G/DL (ref 31.5–36.5)
MCV RBC AUTO: 97 FL (ref 78–100)
PLATELET # BLD AUTO: 268 10E3/UL (ref 150–450)
POTASSIUM SERPL-SCNC: 3.1 MMOL/L (ref 3.4–5.3)
RBC # BLD AUTO: 3.24 10E6/UL (ref 4.4–5.9)
SODIUM SERPL-SCNC: 140 MMOL/L (ref 135–145)
WBC # BLD AUTO: 6.5 10E3/UL (ref 4–11)

## 2025-08-13 VITALS
WEIGHT: 162 LBS | TEMPERATURE: 97.3 F | BODY MASS INDEX: 23.19 KG/M2 | OXYGEN SATURATION: 98 % | HEART RATE: 71 BPM | SYSTOLIC BLOOD PRESSURE: 126 MMHG | RESPIRATION RATE: 18 BRPM | HEIGHT: 70 IN | DIASTOLIC BLOOD PRESSURE: 54 MMHG

## 2025-08-13 RX ORDER — METHYLPREDNISOLONE ACETATE 40 MG/ML
40 INJECTION, SUSPENSION INTRA-ARTICULAR; INTRALESIONAL; INTRAMUSCULAR; SOFT TISSUE
Status: COMPLETED | OUTPATIENT
Start: 2025-08-01 | End: 2025-08-01

## 2025-08-13 RX ORDER — LIDOCAINE HYDROCHLORIDE 10 MG/ML
4 INJECTION, SOLUTION INFILTRATION; PERINEURAL
Status: COMPLETED | OUTPATIENT
Start: 2025-08-01 | End: 2025-08-01

## 2025-08-14 ENCOUNTER — NURSING HOME VISIT (OUTPATIENT)
Dept: GERIATRICS | Facility: CLINIC | Age: OVER 89
End: 2025-08-14
Payer: MEDICARE

## 2025-08-14 DIAGNOSIS — J96.01 ACUTE RESPIRATORY FAILURE WITH HYPOXIA (H): ICD-10-CM

## 2025-08-14 DIAGNOSIS — J18.9 PNEUMONIA OF LEFT LOWER LOBE DUE TO INFECTIOUS ORGANISM: Primary | ICD-10-CM

## 2025-08-14 DIAGNOSIS — J18.9 RECURRENT PNEUMONIA: ICD-10-CM

## 2025-08-14 PROCEDURE — 99310 SBSQ NF CARE HIGH MDM 45: CPT | Performed by: NURSE PRACTITIONER

## 2025-08-15 ENCOUNTER — LAB REQUISITION (OUTPATIENT)
Dept: LAB | Facility: CLINIC | Age: OVER 89
End: 2025-08-15
Payer: MEDICARE

## 2025-08-15 RX ORDER — ONDANSETRON 4 MG/1
4 TABLET, FILM COATED ORAL 2 TIMES DAILY
COMMUNITY
Start: 2025-08-15

## 2025-08-15 RX ORDER — PREDNISONE 20 MG/1
40 TABLET ORAL DAILY
COMMUNITY
Start: 2025-08-15

## 2025-08-19 ENCOUNTER — NURSING HOME VISIT (OUTPATIENT)
Dept: GERIATRICS | Facility: CLINIC | Age: OVER 89
End: 2025-08-19
Payer: MEDICARE

## 2025-08-19 VITALS
HEIGHT: 70 IN | SYSTOLIC BLOOD PRESSURE: 138 MMHG | BODY MASS INDEX: 23.34 KG/M2 | WEIGHT: 163 LBS | OXYGEN SATURATION: 95 % | DIASTOLIC BLOOD PRESSURE: 69 MMHG | HEART RATE: 74 BPM | TEMPERATURE: 96.4 F | RESPIRATION RATE: 18 BRPM

## 2025-08-21 VITALS
BODY MASS INDEX: 24.14 KG/M2 | OXYGEN SATURATION: 94 % | TEMPERATURE: 96.4 F | HEIGHT: 69 IN | RESPIRATION RATE: 18 BRPM | SYSTOLIC BLOOD PRESSURE: 138 MMHG | WEIGHT: 163 LBS | DIASTOLIC BLOOD PRESSURE: 69 MMHG | HEART RATE: 74 BPM

## 2025-08-22 ENCOUNTER — NURSING HOME VISIT (OUTPATIENT)
Dept: GERIATRICS | Facility: CLINIC | Age: OVER 89
End: 2025-08-22
Payer: MEDICARE

## 2025-08-26 ENCOUNTER — LAB REQUISITION (OUTPATIENT)
Dept: LAB | Facility: CLINIC | Age: OVER 89
End: 2025-08-26
Payer: MEDICARE

## 2025-08-26 ENCOUNTER — NURSING HOME VISIT (OUTPATIENT)
Dept: GERIATRICS | Facility: CLINIC | Age: OVER 89
End: 2025-08-26
Payer: MEDICARE

## 2025-08-26 VITALS
HEART RATE: 69 BPM | WEIGHT: 164 LBS | HEIGHT: 69 IN | SYSTOLIC BLOOD PRESSURE: 126 MMHG | RESPIRATION RATE: 18 BRPM | BODY MASS INDEX: 24.29 KG/M2 | OXYGEN SATURATION: 93 % | TEMPERATURE: 96.8 F | DIASTOLIC BLOOD PRESSURE: 61 MMHG

## 2025-08-26 DIAGNOSIS — R53.83 OTHER FATIGUE: ICD-10-CM

## 2025-08-26 DIAGNOSIS — J18.9 RECURRENT PNEUMONIA: Primary | ICD-10-CM

## 2025-08-26 DIAGNOSIS — N40.0 BPH WITHOUT URINARY OBSTRUCTION: ICD-10-CM

## 2025-08-26 DIAGNOSIS — D64.9 ANEMIA, UNSPECIFIED: ICD-10-CM

## 2025-08-26 DIAGNOSIS — R91.8 LEFT LOWER LOBE PULMONARY INFILTRATE: ICD-10-CM

## 2025-08-26 DIAGNOSIS — Z87.01 PERSONAL HISTORY OF PNEUMONIA (RECURRENT): ICD-10-CM

## 2025-08-26 DIAGNOSIS — Z87.891 FORMER SMOKER: ICD-10-CM

## 2025-08-26 DIAGNOSIS — F03.B4 MODERATE DEMENTIA WITH ANXIETY, UNSPECIFIED DEMENTIA TYPE (H): ICD-10-CM

## 2025-08-26 DIAGNOSIS — D50.9 IRON DEFICIENCY ANEMIA, UNSPECIFIED IRON DEFICIENCY ANEMIA TYPE: ICD-10-CM

## 2025-08-26 DIAGNOSIS — R06.09 DYSPNEA ON EXERTION: ICD-10-CM

## 2025-08-26 DIAGNOSIS — N47.1 PHIMOSIS: ICD-10-CM

## 2025-08-26 DIAGNOSIS — R05.3 CHRONIC COUGH: ICD-10-CM

## 2025-08-26 DIAGNOSIS — R06.02 SHORTNESS OF BREATH: ICD-10-CM

## 2025-08-26 PROBLEM — M25.559 HIP PAIN: Status: ACTIVE | Noted: 2025-01-06

## 2025-08-26 PROBLEM — K21.00 GASTRO-ESOPHAGEAL REFLUX DISEASE WITH ESOPHAGITIS: Status: ACTIVE | Noted: 2025-01-06

## 2025-08-26 PROBLEM — R39.15 URINARY URGENCY: Status: ACTIVE | Noted: 2023-10-25

## 2025-08-26 PROBLEM — R73.9 HYPERGLYCEMIA: Status: ACTIVE | Noted: 2025-01-06

## 2025-08-26 PROBLEM — R07.9 CHEST PAIN: Status: RESOLVED | Noted: 2025-01-06 | Resolved: 2025-08-26

## 2025-08-26 PROBLEM — N39.41 URGE INCONTINENCE OF URINE: Status: ACTIVE | Noted: 2023-10-25

## 2025-08-26 PROBLEM — B00.50 OPHTHALMIC HERPES SIMPLEX: Status: ACTIVE | Noted: 2025-01-06

## 2025-08-26 PROBLEM — M13.0 POLYARTHROPATHY: Status: ACTIVE | Noted: 2025-01-06

## 2025-08-26 PROBLEM — M17.12 OSTEOARTHRITIS OF LEFT KNEE: Status: ACTIVE | Noted: 2019-06-21

## 2025-08-26 PROBLEM — R35.1 NOCTURIA: Status: ACTIVE | Noted: 2023-10-25

## 2025-08-26 PROBLEM — R06.00 DYSPNEA: Status: ACTIVE | Noted: 2025-01-01

## 2025-08-26 PROBLEM — H90.5 UNSPECIFIED SENSORINEURAL HEARING LOSS: Status: ACTIVE | Noted: 2025-08-26

## 2025-08-26 PROBLEM — R79.9 BLOOD CHEMISTRY ABNORMALITY: Status: RESOLVED | Noted: 2025-01-06 | Resolved: 2025-08-26

## 2025-08-27 LAB
ALBUMIN SERPL BCG-MCNC: 3 G/DL (ref 3.5–5.2)
ALP SERPL-CCNC: 64 U/L (ref 40–150)
ALT SERPL W P-5'-P-CCNC: 18 U/L (ref 0–70)
ANION GAP SERPL CALCULATED.3IONS-SCNC: 10 MMOL/L (ref 7–15)
AST SERPL W P-5'-P-CCNC: 19 U/L (ref 0–45)
BILIRUB SERPL-MCNC: 0.8 MG/DL
BUN SERPL-MCNC: 13.9 MG/DL (ref 8–23)
CALCIUM SERPL-MCNC: 8.9 MG/DL (ref 8.8–10.4)
CHLORIDE SERPL-SCNC: 106 MMOL/L (ref 98–107)
CREAT SERPL-MCNC: 0.91 MG/DL (ref 0.67–1.17)
EGFRCR SERPLBLD CKD-EPI 2021: 80 ML/MIN/1.73M2
ERYTHROCYTE [DISTWIDTH] IN BLOOD BY AUTOMATED COUNT: 12.9 % (ref 10–15)
GLUCOSE SERPL-MCNC: 85 MG/DL (ref 70–99)
HCO3 SERPL-SCNC: 26 MMOL/L (ref 22–29)
HCT VFR BLD AUTO: 34.9 % (ref 40–53)
HGB BLD-MCNC: 11.2 G/DL (ref 13.3–17.7)
MCH RBC QN AUTO: 31.2 PG (ref 26.5–33)
MCHC RBC AUTO-ENTMCNC: 32.1 G/DL (ref 31.5–36.5)
MCV RBC AUTO: 97.2 FL (ref 78–100)
NT-PROBNP SERPL-MCNC: 335 PG/ML (ref 0–852)
PLATELET # BLD AUTO: 273 10E3/UL (ref 150–450)
POTASSIUM SERPL-SCNC: 4.5 MMOL/L (ref 3.4–5.3)
PROT SERPL-MCNC: 5.8 G/DL (ref 6.4–8.3)
RBC # BLD AUTO: 3.59 10E6/UL (ref 4.4–5.9)
SODIUM SERPL-SCNC: 142 MMOL/L (ref 135–145)
VIT D+METAB SERPL-MCNC: 28 NG/ML (ref 20–50)
WBC # BLD AUTO: 6.51 10E3/UL (ref 4–11)

## 2025-08-28 ENCOUNTER — LAB REQUISITION (OUTPATIENT)
Dept: LAB | Facility: CLINIC | Age: OVER 89
End: 2025-08-28
Payer: MEDICARE

## 2025-08-28 DIAGNOSIS — R30.0 DYSURIA: ICD-10-CM

## 2025-08-28 LAB
ALBUMIN UR-MCNC: NEGATIVE MG/DL
APPEARANCE UR: CLEAR
BILIRUB UR QL STRIP: NEGATIVE
COLOR UR AUTO: YELLOW
GLUCOSE UR STRIP-MCNC: NEGATIVE MG/DL
HGB UR QL STRIP: NEGATIVE
KETONES UR STRIP-MCNC: NEGATIVE MG/DL
LEUKOCYTE ESTERASE UR QL STRIP: NEGATIVE
MUCOUS THREADS #/AREA URNS LPF: PRESENT /LPF
NITRATE UR QL: NEGATIVE
PH UR STRIP: 5.5 [PH] (ref 5–7)
RBC URINE: 1 /HPF
SP GR UR STRIP: 1.02 (ref 1–1.03)
SQUAMOUS EPITHELIAL: <1 /HPF
UROBILINOGEN UR STRIP-MCNC: NORMAL MG/DL
WBC URINE: 3 /HPF

## 2025-09-02 DIAGNOSIS — J18.9 PNEUMONIA OF LEFT LOWER LOBE DUE TO INFECTIOUS ORGANISM: Primary | ICD-10-CM

## (undated) RX ORDER — FENTANYL CITRATE 50 UG/ML
INJECTION, SOLUTION INTRAMUSCULAR; INTRAVENOUS
Status: DISPENSED
Start: 2025-02-14

## (undated) RX ORDER — FENTANYL CITRATE 50 UG/ML
INJECTION, SOLUTION INTRAMUSCULAR; INTRAVENOUS
Status: DISPENSED
Start: 2025-02-13